# Patient Record
Sex: FEMALE | Race: WHITE | Employment: OTHER | URBAN - METROPOLITAN AREA
[De-identification: names, ages, dates, MRNs, and addresses within clinical notes are randomized per-mention and may not be internally consistent; named-entity substitution may affect disease eponyms.]

---

## 2019-02-19 ENCOUNTER — ED HISTORICAL/CONVERTED ENCOUNTER (OUTPATIENT)
Dept: OTHER | Age: 39
End: 2019-02-19

## 2020-04-28 ENCOUNTER — ED HISTORICAL/CONVERTED ENCOUNTER (OUTPATIENT)
Dept: OTHER | Age: 40
End: 2020-04-28

## 2021-10-12 ENCOUNTER — TRANSCRIBE ORDER (OUTPATIENT)
Dept: SCHEDULING | Age: 41
End: 2021-10-12

## 2021-10-12 DIAGNOSIS — Z12.31 SCREENING MAMMOGRAM FOR HIGH-RISK PATIENT: Primary | ICD-10-CM

## 2021-11-12 ENCOUNTER — HOSPITAL ENCOUNTER (OUTPATIENT)
Dept: MAMMOGRAPHY | Age: 41
Discharge: HOME | End: 2021-11-12
Payer: MEDICARE

## 2021-11-12 DIAGNOSIS — Z12.31 SCREENING MAMMOGRAM FOR HIGH-RISK PATIENT: ICD-10-CM

## 2021-11-12 PROCEDURE — 77063 BREAST TOMOSYNTHESIS BI: CPT

## 2021-12-12 ENCOUNTER — HOSPITAL ENCOUNTER (INPATIENT)
Age: 41
LOS: 10 days | Discharge: HOME | DRG: 885 | End: 2021-12-22
Attending: EMERGENCY MEDICINE | Admitting: PSYCHIATRY & NEUROLOGY
Payer: MEDICARE

## 2021-12-12 ENCOUNTER — APPOINTMENT (OUTPATIENT)
Dept: ULTRASOUND IMAGING | Age: 41
DRG: 885 | End: 2021-12-12
Attending: EMERGENCY MEDICINE
Payer: MEDICARE

## 2021-12-12 DIAGNOSIS — Z34.90 EARLY STAGE OF PREGNANCY: ICD-10-CM

## 2021-12-12 DIAGNOSIS — F32.A DEPRESSION, UNSPECIFIED DEPRESSION TYPE: Primary | ICD-10-CM

## 2021-12-12 PROBLEM — F41.9 ANXIETY: Status: ACTIVE | Noted: 2021-12-12

## 2021-12-12 LAB
ALBUMIN SERPL-MCNC: 3.7 G/DL (ref 3.5–5)
ALBUMIN/GLOB SERPL: 1 {RATIO} (ref 1.1–2.2)
ALP SERPL-CCNC: 116 U/L (ref 45–117)
ALT SERPL-CCNC: 26 U/L (ref 12–78)
AMPHET UR QL SCN: POSITIVE
ANION GAP SERPL CALC-SCNC: 7 MMOL/L (ref 5–15)
APPEARANCE UR: ABNORMAL
AST SERPL W P-5'-P-CCNC: 16 U/L (ref 15–37)
BACTERIA URNS QL MICRO: NEGATIVE /HPF
BARBITURATES UR QL SCN: NEGATIVE
BASOPHILS # BLD: 0.1 K/UL (ref 0–0.1)
BASOPHILS NFR BLD: 0 % (ref 0–1)
BENZODIAZ UR QL: NEGATIVE
BILIRUB SERPL-MCNC: 0.6 MG/DL (ref 0.2–1)
BILIRUB UR QL: NEGATIVE
BUN SERPL-MCNC: 8 MG/DL (ref 6–20)
BUN/CREAT SERPL: 11 (ref 12–20)
CA-I BLD-MCNC: 9.5 MG/DL (ref 8.5–10.1)
CANNABINOIDS UR QL SCN: POSITIVE
CHLORIDE SERPL-SCNC: 105 MMOL/L (ref 97–108)
CO2 SERPL-SCNC: 25 MMOL/L (ref 21–32)
COCAINE UR QL SCN: POSITIVE
COLOR UR: ABNORMAL
COVID-19 RAPID TEST, COVR: NOT DETECTED
CREAT SERPL-MCNC: 0.74 MG/DL (ref 0.55–1.02)
DIFFERENTIAL METHOD BLD: ABNORMAL
DRUG SCRN COMMENT,DRGCM: ABNORMAL
EOSINOPHIL # BLD: 0 K/UL (ref 0–0.4)
EOSINOPHIL NFR BLD: 0 % (ref 0–7)
ERYTHROCYTE [DISTWIDTH] IN BLOOD BY AUTOMATED COUNT: 14.6 % (ref 11.5–14.5)
ETHANOL SERPL-MCNC: <4 MG/DL
GLOBULIN SER CALC-MCNC: 3.6 G/DL (ref 2–4)
GLUCOSE SERPL-MCNC: 123 MG/DL (ref 65–100)
GLUCOSE UR STRIP.AUTO-MCNC: NEGATIVE MG/DL
HCG SERPL QL: POSITIVE
HCG SERPL-ACNC: 440 MIU/ML (ref 0–6)
HCT VFR BLD AUTO: 41.5 % (ref 35–47)
HGB BLD-MCNC: 13.2 G/DL (ref 11.5–16)
HGB UR QL STRIP: ABNORMAL
IMM GRANULOCYTES # BLD AUTO: 0.1 K/UL (ref 0–0.04)
IMM GRANULOCYTES NFR BLD AUTO: 0 % (ref 0–0.5)
KETONES UR QL STRIP.AUTO: 5 MG/DL
LEUKOCYTE ESTERASE UR QL STRIP.AUTO: ABNORMAL
LYMPHOCYTES # BLD: 0.9 K/UL (ref 0.8–3.5)
LYMPHOCYTES NFR BLD: 5 % (ref 12–49)
MCH RBC QN AUTO: 27.8 PG (ref 26–34)
MCHC RBC AUTO-ENTMCNC: 31.8 G/DL (ref 30–36.5)
MCV RBC AUTO: 87.6 FL (ref 80–99)
METHADONE UR QL: NEGATIVE
MONOCYTES # BLD: 1 K/UL (ref 0–1)
MONOCYTES NFR BLD: 6 % (ref 5–13)
MUCOUS THREADS URNS QL MICRO: ABNORMAL /LPF
NEUTS SEG # BLD: 15.8 K/UL (ref 1.8–8)
NEUTS SEG NFR BLD: 89 % (ref 32–75)
NITRITE UR QL STRIP.AUTO: NEGATIVE
NRBC # BLD: 0 K/UL (ref 0–0.01)
NRBC BLD-RTO: 0 PER 100 WBC
OPIATES UR QL: NEGATIVE
PCP UR QL: NEGATIVE
PH UR STRIP: 7 [PH] (ref 5–8)
PLATELET # BLD AUTO: 393 K/UL (ref 150–400)
PMV BLD AUTO: 9.8 FL (ref 8.9–12.9)
POTASSIUM SERPL-SCNC: 4 MMOL/L (ref 3.5–5.1)
PROT SERPL-MCNC: 7.3 G/DL (ref 6.4–8.2)
PROT UR STRIP-MCNC: NEGATIVE MG/DL
RBC # BLD AUTO: 4.74 M/UL (ref 3.8–5.2)
RBC #/AREA URNS HPF: ABNORMAL /HPF (ref 0–5)
SODIUM SERPL-SCNC: 137 MMOL/L (ref 136–145)
SP GR UR REFRACTOMETRY: 1.01 (ref 1–1.03)
SPECIMEN SOURCE: NORMAL
UA: UC IF INDICATED,UAUC: ABNORMAL
UROBILINOGEN UR QL STRIP.AUTO: 0.1 EU/DL (ref 0.1–1)
WBC # BLD AUTO: 17.8 K/UL (ref 3.6–11)
WBC URNS QL MICRO: ABNORMAL /HPF (ref 0–4)

## 2021-12-12 PROCEDURE — 76817 TRANSVAGINAL US OBSTETRIC: CPT

## 2021-12-12 PROCEDURE — 76801 OB US < 14 WKS SINGLE FETUS: CPT

## 2021-12-12 PROCEDURE — 74011250637 HC RX REV CODE- 250/637: Performed by: PSYCHIATRY & NEUROLOGY

## 2021-12-12 PROCEDURE — 84703 CHORIONIC GONADOTROPIN ASSAY: CPT

## 2021-12-12 PROCEDURE — 36415 COLL VENOUS BLD VENIPUNCTURE: CPT

## 2021-12-12 PROCEDURE — 81001 URINALYSIS AUTO W/SCOPE: CPT

## 2021-12-12 PROCEDURE — 80053 COMPREHEN METABOLIC PANEL: CPT

## 2021-12-12 PROCEDURE — 84702 CHORIONIC GONADOTROPIN TEST: CPT

## 2021-12-12 PROCEDURE — 85025 COMPLETE CBC W/AUTO DIFF WBC: CPT

## 2021-12-12 PROCEDURE — 80307 DRUG TEST PRSMV CHEM ANLYZR: CPT

## 2021-12-12 PROCEDURE — 99284 EMERGENCY DEPT VISIT MOD MDM: CPT

## 2021-12-12 PROCEDURE — 65220000003 HC RM SEMIPRIVATE PSYCH

## 2021-12-12 PROCEDURE — 82077 ASSAY SPEC XCP UR&BREATH IA: CPT

## 2021-12-12 PROCEDURE — 87635 SARS-COV-2 COVID-19 AMP PRB: CPT

## 2021-12-12 RX ORDER — ACETAMINOPHEN 325 MG/1
650 TABLET ORAL
Status: DISCONTINUED | OUTPATIENT
Start: 2021-12-12 | End: 2021-12-22 | Stop reason: HOSPADM

## 2021-12-12 RX ORDER — ADHESIVE BANDAGE
30 BANDAGE TOPICAL DAILY PRN
Status: DISCONTINUED | OUTPATIENT
Start: 2021-12-12 | End: 2021-12-22 | Stop reason: HOSPADM

## 2021-12-12 RX ADMIN — ACETAMINOPHEN 650 MG: 325 TABLET ORAL at 19:50
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
## 2021-12-12 NOTE — BH NOTES
Pt. In process of being admitted to [address] [place] [address]. Unable to attend group. . Relaxation

## 2021-12-12 NOTE — ROUTINE PROCESS
TRANSFER - OUT REPORT:    Verbal report given to Aletha Dickinson RN(name) on Chepe Dave  being transferred to [address] [place] [address](unit) for routine progression of care       Report consisted of patients Situation, Background, Assessment and   Recommendations(SBAR). Information from the following report(s) SBAR, ED Summary, [facility] and Recent Results was reviewed with the receiving nurse. Lines:       Opportunity for questions and clarification was provided.       Patient transported with:   [employer]
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
## 2021-12-12 NOTE — BH NOTES
Client continues to cry uncontrollable and apologizing if she has said anything that bothered someone. Verbalizes that she is going through a rough time and strggling to hold on. Verbalizes that she is unemotionally prepared. Client given emotional support. Sshe was able to eat some supper. Remains on close observation for safety.

## 2021-12-12 NOTE — BH NOTES
This 37year old caucasin female is being admitted to [address] [place] [address] under the professional servives of  with an admitting diagnosis of psychosis. Client presents a flat affect. She is emotional and tearful. Verbalizes that she is too upset to sign the consents. She did cooperate with the search. Verbalizes that the doctor told her to go home but the doctor that works up here wanted her to stay. Verbalizes that she is in shock about being pregnant and verbalizes that she is not sure she believes she is pregnant. told me she has grandchildren but not sure she has any other children. She has flight of ideas and loose associations. Appears to to be listening to internal stimuli because at times she sad she needed to whisper because ths voices were listening to her. Verbalizes that she has been poisoned and that is why she is feeling confused. Believes that her mom is dead as well as some other family members. Verbalizes that she feels like her whole world is crashing around her and she doesnot know what to do. Denies feeling suicidal tierney homicidal.Verbalizes smoking weed and meth at times,then told me she might smoke cocaine too. Client will be placed on close observation for safety. Torrie Tracy

## 2021-12-13 LAB — HCG SERPL-ACNC: 674 MIU/ML (ref 0–6)

## 2021-12-13 PROCEDURE — 84702 CHORIONIC GONADOTROPIN TEST: CPT

## 2021-12-13 PROCEDURE — 65220000003 HC RM SEMIPRIVATE PSYCH

## 2021-12-13 PROCEDURE — 74011250637 HC RX REV CODE- 250/637: Performed by: INTERNAL MEDICINE

## 2021-12-13 PROCEDURE — 36415 COLL VENOUS BLD VENIPUNCTURE: CPT

## 2021-12-13 PROCEDURE — 74011250637 HC RX REV CODE- 250/637: Performed by: PSYCHIATRY & NEUROLOGY

## 2021-12-13 RX ORDER — BUDESONIDE AND FORMOTEROL FUMARATE DIHYDRATE 160; 4.5 UG/1; UG/1
2 AEROSOL RESPIRATORY (INHALATION)
Status: DISCONTINUED | OUTPATIENT
Start: 2021-12-13 | End: 2021-12-22 | Stop reason: HOSPADM

## 2021-12-13 RX ORDER — ALBUTEROL SULFATE 90 UG/1
2 AEROSOL, METERED RESPIRATORY (INHALATION)
Status: DISCONTINUED | OUTPATIENT
Start: 2021-12-13 | End: 2021-12-14

## 2021-12-13 RX ORDER — GUAIFENESIN 100 MG/5ML
100 SOLUTION ORAL
Status: DISCONTINUED | OUTPATIENT
Start: 2021-12-13 | End: 2021-12-22 | Stop reason: HOSPADM

## 2021-12-13 RX ADMIN — ALBUTEROL SULFATE 2 PUFF: 108 AEROSOL, METERED RESPIRATORY (INHALATION) at 17:26

## 2021-12-13 RX ADMIN — BUDESONIDE AND FORMOTEROL FUMARATE DIHYDRATE 2 PUFF: 160; 4.5 AEROSOL RESPIRATORY (INHALATION) at 17:26

## 2021-12-13 RX ADMIN — ACETAMINOPHEN 650 MG: 325 TABLET ORAL at 09:36

## 2021-12-13 RX ADMIN — BUDESONIDE AND FORMOTEROL FUMARATE DIHYDRATE 2 PUFF: 160; 4.5 AEROSOL RESPIRATORY (INHALATION) at 21:24

## 2021-12-13 RX ADMIN — ALBUTEROL SULFATE 2 PUFF: 108 AEROSOL, METERED RESPIRATORY (INHALATION) at 21:24

## 2021-12-13 RX ADMIN — ACETAMINOPHEN 650 MG: 325 TABLET ORAL at 00:14

## 2021-12-13 NOTE — CONSULTS
Consult    Patient: Catie Pritchett MRN: [id number]  SSN: [id number]    YOB: 1980  Age: 37 y.o. Sex: female      Subjective:      Catie Pritchett is a 37 y.o. female who is being seen for anxiety depression, drug abuse, PTSD, has a cough. Smokes cigarettes    Past Medical History:   Diagnosis Date    Anxiety     Depression     Drug abuse ([facility] [address]     PTSD (post-traumatic stress disorder)      History reviewed. No pertinent surgical history. History reviewed. No pertinent family history. Social History     Tobacco Use    Smoking status: Heavy Tobacco Smoker     Packs/day: 1.00    Smokeless tobacco: Never Used   Substance Use Topics    Alcohol use: Yes     Comment: rarely      Current Facility-Administered Medications   Medication Dose Route Frequency Provider Last Rate Last Admin    acetaminophen (TYLENOL) tablet 650 mg  650 mg Oral Q4H PRN Jeffery Madden MD   650 mg at 12/13/21 0936    magnesium hydroxide (MILK OF MAGNESIA) 400 mg/5 mL oral suspension 30 mL  30 mL Oral DAILY PRN Azael Agarwal MD            Allergies   Allergen Reactions    Nickel Rash       Review of Systems:  A comprehensive review of systems was negative except for that written in the History of Present Illness. Objective:     Vitals:    12/12/21 0756 12/12/21 0837 12/12/21 2000 12/13/21 0900   BP: 130/68 118/81 122/65 123/80   Pulse: (!) 115 81 93 88   Resp: 16 24 20 20   Temp: 97.8 °F (36.6 °C)  98.1 °F (36.7 °C) 97.8 °F (36.6 °C)   SpO2: 100% 100%  98%   Weight: 79.4 kg (175 lb)      Height: 5' 4\" (1.626 m)           Physical Exam:  General:  Alert, cooperative, no distress, appears stated age. Eyes:  Conjunctivae/corneas clear. PERRL, EOMs intact. Fundi benign   Ears:  Normal TMs and external ear canals both ears. Nose: Nares normal. Septum midline. Mucosa normal. No drainage or sinus tenderness.    Mouth/Throat: Lips, mucosa, and tongue normal. Teeth and gums normal.   Neck: Supple, symmetrical, trachea midline, no adenopathy, thyroid: no enlargment/tenderness/nodules, no carotid bruit and no JVD. Back:   Symmetric, no curvature. ROM normal. No CVA tenderness. Lungs:    Prolonged expiration to auscultation bilaterally. Heart:  Regular rate and rhythm, S1, S2 normal, no murmur, click, rub or gallop. Abdomen:   Soft, non-tender. Bowel sounds normal. No masses,  No organomegaly. Extremities: Extremities normal, atraumatic, no cyanosis or edema. Pulses: 2+ and symmetric all extremities. Skin: Skin color, texture, turgor normal. No rashes or lesions   Lymph nodes: Cervical, supraclavicular, and axillary nodes normal.   Neurologic: CNII-XII intact. Normal strength, sensation and reflexes throughout. Recent Results (from the past 24 hour(s))   BETA HCG, QT    Collection Time: 12/12/21 11:34 AM   Result Value Ref Range    Beta HCG, .0 (H) 0 - 6 mIU/mL   COVID-19 RAPID TEST    Collection Time: 12/12/21  1:38 PM   Result Value Ref Range    Specimen source Please find results under separate order      COVID-19 rapid test Not Detected Not Detected         Assessment:     Hospital Problems  Never Reviewed          Codes Class Noted POA    Anxiety ICD-10-CM: F41.9  ICD-9-CM: 300.00  12/12/2021 Unknown        Depression ICD-10-CM: F32. A  ICD-9-CM: 170  12/12/2021 Unknown          COPD with acute extubation      Plan:     Result albuterol on Flovent    Signed By: Miriam Alcaraz MD     December 13, 2021

## 2021-12-13 NOTE — GROUP NOTE
[facility] GROUP DOCUMENTATION INDIVIDUAL                                                                          Group Therapy Note    Date: 12/13/2021    Group Start Time: 8131  Group End Time: 1400  Group Topic: Process Group - Inpatient    [facility] CARE MANAGEMENT    Aida Porras BSW    [facility] GROUP DOCUMENTATION GROUP    Group Therapy Note    The facilitator engaged the group in a game and an activity that promoted feedback and support for one another. Attendees: 9/14         Attendance: Attended    Patient's Goal:  Attend Group     Interventions/techniques: Supported    Follows Directions: Followed directions    Interactions: Interacted appropriately    Mental Status: Anxious and Worried    Behavior/appearance: Agitated, Disheveled, Grooming impaired, Needed prompting, Negative, Poor eye contact, Resistive/oppositional and Withdrawn/quiet    Goals Achieved: Able to listen to others      Additional Notes: The patient appeared to be in a negative mood while in group and voiced that she felt like people were making fun of her and laughing about things that weren't funny. The writer encouraged her to understand that people in group are of different capacities and diagnoses. And not to take everything personally, but she became tearful and was inconsolable.       COLTON Garcia

## 2021-12-13 NOTE — BH NOTES
Patient has been mostly isolating to her room. Family brought some clothes but several pieces are not appropriate for this unit so were put in storage. Patient tearful at times, want to go home, \"needs\" to go home to her \"family\". When asked where the family lives. She shrugged and then named apx 6 states/locations. Dr Pat Mayen ordered repeat HCG quant which was drawn by lab. Patient isolated in her room much of the shift, sitting on the bed with sad affect.   Journal given to patient  [phone]- patient wants AMA, d19 is going to eval.      Verdict from [address]    Patient staying on VOLUNTARY basis for treatment

## 2021-12-13 NOTE — BH NOTES
PSA PART II ADDITIONAL INFORMATION        Access To Fire Arms: No    Substance Use: YES    Last Use: Unable to provide     Type of Substance:  Cocaine use and THC use    Frequency of Use: Unable to provide     Request to See : YES    If yes, notified : NO    Release of Information Signed: NO    Release of Information Signed For: Candy Zhang [phone]
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
## 2021-12-13 NOTE — BH NOTES
Pt. Encouraged to attend group but did not attend.   Psycho-ED/Information relating to trauma and ways to cope

## 2021-12-13 NOTE — GROUP NOTE
DIANA  GROUP DOCUMENTATION INDIVIDUAL                                                                          Group Therapy Note    Date: 12/13/2021    Group Start Time: 1025  Group End Time: 7000  Group Topic: Nursing    SRM 2  NON ACUTE    Jose E Magana LPN IP  GROUP DOCUMENTATION GROUP    Group Therapy Note    Attendees: 8/15         Attendance: Attended    Patient's Goal:  ID benefits of medications    Interventions/techniques: Challenged    Follows Directions:  Followed directions    Interactions: Interacted appropriately    Mental Status: Calm    Behavior/appearance: Attentive    Goals Achieved: Able to listen to others      Additional Notes:  Pt. Stated medications keep her calm & helps her medically    Idris Pizarro LPN

## 2021-12-13 NOTE — BH NOTES
The patient signed JANETT but declined to sign her treatment plan because she was unable to read the document for herself due to her eyesight, will follow up.

## 2021-12-13 NOTE — BH NOTES
The pt is resting quietly in bed at the present time. At [facility] [address] the pt c/o apryl hip pain that she rated a 10/10. Pt received prn Tylenol and was given two additional pillow for comfort. Pt noted to be dramatic when expressing her needs or concern. After receiving the Tylenol, the pt was rested quietly in bed without any distress. At [facility], the pt came to the nurses station and stated, \" I just woke up. \"  When asked if she needed anything, the pt stated, \" I just woke up! \"  The pt was informed of the time, given a snack, and informed of the routine in the am. She returned to her room and has been resting quietly without any c/o discomfort. Respirations are quiet and unlabored. She remains on close observation for safety.

## 2021-12-13 NOTE — PROGRESS NOTES
Problem: Depressed Mood (Adult/Pediatric)  Goal: *STG: Remains safe in hospital  Outcome: Progressing Towards Goal     Problem: Psychosis  Goal: *STG: Decreased hallucinations  Outcome: Not Progressing Towards Goal  Goal: *STG: Decreased delusional thinking  Outcome: Not Progressing Towards Goal     Ms. Harrington was in her room alert and tearful at the onset of the shift. She began crying profusely throughout the hallways and presented a flight of ideas as the source of her despair. She presented disheveled, restless and labile. She could not identify the hospital or day and time. She became increasingly clear and calm after speaking to her daughter on the phone. She was redirected for yelling at her peers to \"shut up\" and other words while she was on the phone. She rated her anxiety 10 out of 10 and stated that she was only doing drugs because of her boyfriend. She spoke of 4 children and a grandchild that she missed. She stated that she was going to pace and try breathing exercises to cope with the anxiety until she became tired enough to sleep. Dr. Riuz Garcia was informed of her condition and instructed to continue to monitor. Ms. Karena Reyes denied abnormal bleeding and confirmed that she had not menstruated in at least 2 months. Staff will continue to monitor on every 15 minute checks.

## 2021-12-14 PROBLEM — F19.10 POLYSUBSTANCE ABUSE (HCC): Status: ACTIVE | Noted: 2021-12-14

## 2021-12-14 PROBLEM — Z32.01 POSITIVE BLOOD PREGNANCY TEST: Status: ACTIVE | Noted: 2021-12-14

## 2021-12-14 PROBLEM — N91.2 AMENORRHEA: Status: ACTIVE | Noted: 2021-12-14

## 2021-12-14 PROCEDURE — 99231 SBSQ HOSP IP/OBS SF/LOW 25: CPT | Performed by: OBSTETRICS & GYNECOLOGY

## 2021-12-14 PROCEDURE — 74011250637 HC RX REV CODE- 250/637: Performed by: INTERNAL MEDICINE

## 2021-12-14 PROCEDURE — 74011250637 HC RX REV CODE- 250/637: Performed by: PSYCHIATRY & NEUROLOGY

## 2021-12-14 PROCEDURE — 65220000003 HC RM SEMIPRIVATE PSYCH

## 2021-12-14 RX ORDER — ALBUTEROL SULFATE 90 UG/1
2 AEROSOL, METERED RESPIRATORY (INHALATION)
Status: DISCONTINUED | OUTPATIENT
Start: 2021-12-14 | End: 2021-12-22 | Stop reason: HOSPADM

## 2021-12-14 RX ADMIN — LURASIDONE HYDROCHLORIDE 20 MG: 20 TABLET, FILM COATED ORAL at 18:16

## 2021-12-14 RX ADMIN — ALBUTEROL SULFATE 2 PUFF: 108 AEROSOL, METERED RESPIRATORY (INHALATION) at 13:26

## 2021-12-14 RX ADMIN — BUDESONIDE AND FORMOTEROL FUMARATE DIHYDRATE 2 PUFF: 160; 4.5 AEROSOL RESPIRATORY (INHALATION) at 21:24

## 2021-12-14 RX ADMIN — BUDESONIDE AND FORMOTEROL FUMARATE DIHYDRATE 2 PUFF: 160; 4.5 AEROSOL RESPIRATORY (INHALATION) at 09:02

## 2021-12-14 NOTE — BH NOTES
Notified Dr. Romo Homar  of consult with patient. Reviewed positive pregnancy  results, both qualitative & quantitative. Dr. Branden Stokes stated that he will see patient this afternoon.

## 2021-12-14 NOTE — CONSULTS
Tahir Dave is a 37 y.o. female who presents today for the following:  Chief Complaint   Patient presents with    Mental Health Problem        Allergies   Allergen Reactions    Nickel Rash       Current Facility-Administered Medications   Medication    albuterol (PROVENTIL HFA, VENTOLIN HFA, PROAIR HFA) inhaler 2 Puff    lurasidone (LATUDA) tablet 20 mg    budesonide-formoteroL (SYMBICORT) 160-4.5 mcg/actuation HFA inhaler 2 Puff    guaiFENesin (ROBITUSSIN) 100 mg/5 mL oral liquid 100 mg    acetaminophen (TYLENOL) tablet 650 mg    magnesium hydroxide (MILK OF MAGNESIA) 400 mg/5 mL oral suspension 30 mL       Past Medical History:   Diagnosis Date    Anxiety     Depression     Drug abuse ([facility] [address]     PTSD (post-traumatic stress disorder)        History reviewed. No pertinent surgical history. History reviewed. No pertinent family history. Social History     Socioeconomic History    Marital status:      Spouse name: Not on file    Number of children: Not on file    Years of education: Not on file    Highest education level: Not on file   Occupational History    Not on file   Tobacco Use    Smoking status: Heavy Tobacco Smoker     Packs/day: 1.00    Smokeless tobacco: Never Used   Vaping Use    Vaping Use: Never used   Substance and Sexual Activity    Alcohol use: Yes     Comment: rarely    Drug use: Not Currently    Sexual activity: Yes   Other Topics Concern    Not on file   Social History Narrative    Not on file     Social Determinants of Health     Financial Resource Strain:     Difficulty of Paying Living Expenses: Not on file   Food Insecurity:     Worried About Running Out of Food in the Last Year: Not on file    Jacki of Food in the Last Year: Not on file   Transportation Needs:     Lack of Transportation (Medical): Not on file    Lack of Transportation (Non-Medical):  Not on file   Physical Activity:     Days of Exercise per Week: Not on file    Minutes of Exercise per Session: Not on file   Stress:     Feeling of Stress : Not on file   Social Connections:     Frequency of Communication with Friends and Family: Not on file    Frequency of Social Gatherings with Friends and Family: Not on file    Attends Cheondoism Services: Not on file    Active Member of Clubs or Organizations: Not on file    Attends Club or Organization Meetings: Not on file    Marital Status: Not on file   Intimate Partner Violence:     Fear of Current or Ex-Partner: Not on file    Emotionally Abused: Not on file    Physically Abused: Not on file    Sexually Abused: Not on file   Housing Stability:     Unable to Pay for Housing in the Last Year: Not on file    Number of [place] in the Last Year: Not on file    Unstable Housing in the Last Year: Not on file         HPI  37years old patient admitted at behavioral health, consulted after findings of a positive pregnancy test.  She has depression, anxiety, polysubstance abuse, PTSD, history of smoking, history of COPD.  hCG levels sheila from 440 to 674 in approximately 24 hours. Pelvic ultrasound is within normal limits, with no evidence of pregnancy at this time. ROS   Review of Systems   Constitutional: Negative. HENT: Negative. Eyes: Negative. Respiratory: Positive for COPD  Cardiovascular: Negative. Gastrointestinal: Negative. Genitourinary: Positive for amenorrhea  Musculoskeletal: Negative. Skin: Negative. Neurological: Negative. Endo/Heme/Allergies: Negative.     Psychiatric/Behavioral: Positive for bipolar disorder, depression, psychosis    /69   Pulse 84   Temp 98.1 °F (36.7 °C)   Resp 18   Ht 5' 4\" (1.626 m)   Wt 79.4 kg (175 lb)   SpO2 98%   Breastfeeding No   BMI 30.04 kg/m²    OBGyn Exam   Constitutional  · Appearance: well-nourished, well developed, alert, agitated, depressed, crying    Neurologic/Psychiatric  · Mental Status:  · Orientation: grossly oriented to person, place and time  · Mood and Affect: Depressed mood    Results for orders placed or performed during the hospital encounter of 12/12/21   COVID-19 RAPID TEST   Result Value Ref Range    Specimen source Please find results under separate order      COVID-19 rapid test Not Detected Not Detected     US PREG UTS < 14 WKS SNGL    Narrative    The exam was performed using both transabdominal and endovaginal transducers. The uterus measures 8.8 cm x 4.5 cm x 5.2 cm. Endometrial stripe is normal in  appearance. No intrauterine gestational sac is seen. The ovaries are also  unremarkable measuring 3.2 cm x 2.1 cm x 2.0 cm on the right and 2.3 cm x 1.6 cm  x 1.4 cm on the left. There is bilateral ovarian blood flow. No adnexal mass or  is seen. There is trace free fluid posterior to the uterus. Impression    No evidence of intrauterine pregnancy. Findings could be related to  very early pregnancy however ectopic pregnancy is not excluded on this exam and  follow-up is recommended. There is trace free fluid posterior to the uterus  however no adnexal mass is seen. CBC WITH AUTOMATED DIFF   Result Value Ref Range    WBC 17.8 (H) 3.6 - 11.0 K/uL    RBC 4.74 3.80 - 5.20 M/uL    HGB 13.2 11.5 - 16.0 g/dL    HCT 41.5 35.0 - 47.0 %    MCV 87.6 80.0 - 99.0 FL    MCH 27.8 26.0 - 34.0 PG    MCHC 31.8 30.0 - 36.5 g/dL    RDW 14.6 (H) 11.5 - 14.5 %    PLATELET 957 841 - 934 K/uL    MPV 9.8 8.9 - 12.9 FL    NRBC 0.0 0.0  WBC    ABSOLUTE NRBC 0.00 0.00 - 0.01 K/uL    NEUTROPHILS 89 (H) 32 - 75 %    LYMPHOCYTES 5 (L) 12 - 49 %    MONOCYTES 6 5 - 13 %    EOSINOPHILS 0 0 - 7 %    BASOPHILS 0 0 - 1 %    IMMATURE GRANULOCYTES 0 0 - 0.5 %    ABS. NEUTROPHILS 15.8 (H) 1.8 - 8.0 K/UL    ABS. LYMPHOCYTES 0.9 0.8 - 3.5 K/UL    ABS. MONOCYTES 1.0 0.0 - 1.0 K/UL    ABS. EOSINOPHILS 0.0 0.0 - 0.4 K/UL    ABS. BASOPHILS 0.1 0.0 - 0.1 K/UL    ABS. IMM.  GRANS. 0.1 (H) 0.00 - 0.04 K/UL    DF AUTOMATED     METABOLIC PANEL, COMPREHENSIVE   Result Value Ref Range    Sodium 137 136 - 145 mmol/L    Potassium 4.0 3.5 - 5.1 mmol/L    Chloride 105 97 - 108 mmol/L    CO2 25 21 - 32 mmol/L    Anion gap 7 5 - 15 mmol/L    Glucose 123 (H) 65 - 100 mg/dL    BUN 8 6 - 20 mg/dL    Creatinine 0.74 0.55 - 1.02 mg/dL    BUN/Creatinine ratio 11 (L) 12 - 20      GFR est AA >60 >60 ml/min/1.73m2    GFR est non-AA >60 >60 ml/min/1.73m2    Calcium 9.5 8.5 - 10.1 mg/dL    Bilirubin, total 0.6 0.2 - 1.0 mg/dL    AST (SGOT) 16 15 - 37 U/L    ALT (SGPT) 26 12 - 78 U/L    Alk. phosphatase 116 45 - 117 U/L    Protein, total 7.3 6.4 - 8.2 g/dL    Albumin 3.7 3.5 - 5.0 g/dL    Globulin 3.6 2.0 - 4.0 g/dL    A-G Ratio 1.0 (L) 1.1 - 2.2     ETHYL ALCOHOL   Result Value Ref Range    ALCOHOL(ETHYL),SERUM <4 <10 mg/dL   HCG QL SERUM   Result Value Ref Range    HCG, Ql. Positive (A) Negative     DRUG SCREEN, URINE   Result Value Ref Range    AMPHETAMINES Positive (A) Negative      BARBITURATES Negative Negative      BENZODIAZEPINES Negative Negative      COCAINE Positive (A) Negative      METHADONE Negative Negative      OPIATES Negative Negative      PCP(PHENCYCLIDINE) Negative Negative      THC (TH-CANNABINOL) Positive (A) Negative      Drug screen comment        This test is a screen for drugs of abuse in a medical setting only (i.e., they are unconfirmed results and as such must not be used for non-medical purposes, e.g.,employment testing, legal testing). Due to its inherent nature, false positive (FP) and false negative (FN) results may be obtained. Therefore, if necessary for medical care, recommend confirmation of positive findings by GC/MS.    URINALYSIS W/ REFLEX CULTURE    Specimen: Urine   Result Value Ref Range    Color Yellow/Straw      Appearance Turbid (A) Clear      Specific gravity 1.012 1.003 - 1.030      pH (UA) 7.0 5.0 - 8.0      Protein Negative Negative mg/dL    Glucose Negative Negative mg/dL    Ketone 5 (A) Negative mg/dL    Bilirubin Negative Negative      Blood Small (A) Negative      Urobilinogen 0.1 0.1 - 1.0 EU/dL    Nitrites Negative Negative      Leukocyte Esterase Small (A) Negative      UA:UC IF INDICATED Culture not indicated by UA result Culture not indicated by UA result      WBC 0-4 0 - 4 /hpf    RBC 0-5 0 - 5 /hpf    Bacteria Negative Negative /hpf    Mucus Trace /lpf   BETA HCG, QT   Result Value Ref Range    Beta HCG, .0 (H) 0 - 6 mIU/mL   BETA HCG, QT   Result Value Ref Range    Beta HCG, .0 (H) 0 - 6 mIU/mL        Orders Placed This Encounter    COVID-19 RAPID TEST     Standing Status:   Standing     Number of Occurrences:   1     Order Specific Question:   Is this test for diagnosis or screening? Answer:   Diagnosis of ill patient     Order Specific Question:   Symptomatic for COVID-19 as defined by CDC? Answer:   Yes     Order Specific Question:   Date of Symptom Onset     Answer:   12/12/2021     Order Specific Question:   Hospitalized for COVID-19? Answer:   No     Order Specific Question:   Admitted to ICU for COVID-19? Answer:   No     Order Specific Question:   Employed in healthcare setting? Answer:   No     Order Specific Question:   Resident in a congregate (group) care setting? Answer:   No     Order Specific Question:   Pregnant? Answer:   No     Order Specific Question:   Previously tested for COVID-19? Answer:   No    US PREG UTS < 14 WKS SNGL     Standing Status:   Standing     Number of Occurrences:   1     Order Specific Question:   Transport     Answer:   BED [2]     Order Specific Question:   Reason for Exam     Answer:   pos preg     Order Specific Question:   Is Patient Pregnant? Answer: Yes    US UTS TRANSVAGINAL OB     Standing Status:   Standing     Number of Occurrences:   1     Order Specific Question:   Transport     Answer:   BED [2]     Order Specific Question:   Reason for Exam     Answer:   pos preg     Order Specific Question:   Is Patient Pregnant? Answer:    Yes    CBC WITH AUTOMATED DIFF     Standing Status:   Standing     Number of Occurrences:   1    METABOLIC PANEL, COMPREHENSIVE     Standing Status:   Standing     Number of Occurrences:   1    ETHYL ALCOHOL     Standing Status:   Standing     Number of Occurrences:   1    HCG QL SERUM     Standing Status:   Standing     Number of Occurrences:   1    DRUG SCREEN, URINE     Standing Status:   Standing     Number of Occurrences:   1    URINALYSIS W/ REFLEX CULTURE     Standing Status:   Standing     Number of Occurrences:   1    BETA HCG, QT     Standing Status:   Standing     Number of Occurrences:   1    BETA HCG, QT     Standing Status:   Standing     Number of Occurrences:   1    ADULT DIET Regular; Double Portions     Standing Status:   Standing     Number of Occurrences:   1     Order Specific Question:   Primary Diet:     Answer:   Regular     Order Specific Question:   Likes/Dislikes/Preferences: Answer:   Double Portions    VITAL SIGNS PER UNIT ROUTINE     Standing Status:   Standing     Number of Occurrences:   1    UP AD TEOFILO     Standing Status:   Standing     Number of Occurrences:   1    NOTIFY PROVIDER: VITAL SIGNS CHANGES     Standing Status:   Standing     Number of Occurrences:   1    NURSING-MISCELLANEOUS: Abnormal Involuntary Movement Scale (AIMS) ONCE on admission with previous or current history of antipsychotic use ONE TIME     Standing Status:   Standing     Number of Occurrences:   1     Order Specific Question:   Description of Order:     Answer:   Abnormal Involuntary Movement Scale (AIMS) ONCE on admission with previous or current history of antipsychotic use    FULL CODE     Standing Status:   Standing     Number of Occurrences:   1    IP CONSULT TO INTERNAL MEDICINE     Standing Status:   Standing     Number of Occurrences:   1     Order Specific Question:   Reason for Consult: Answer: For medical clearance, evaluation, and H&P. Doctor on Call or Hospitalist may be consulted. Order Specific Question:   Did you call or speak to the consulting provider? Answer: Yes    SAFETY PRECAUTIONS - EVERY 15 MIN ROUNDS     Every 15 minute rounds     Standing Status:   Standing     Number of Occurrences:   1    acetaminophen (TYLENOL) tablet 650 mg    magnesium hydroxide (MILK OF MAGNESIA) 400 mg/5 mL oral suspension 30 mL    DISCONTD: albuterol (PROVENTIL HFA, VENTOLIN HFA, PROAIR HFA) inhaler 2 Puff     Order Specific Question:   MODE OF DELIVERY     Answer:   Inline     Order Specific Question:   Initiate RT Bronchodilator Protocol     Answer: Yes    budesonide-formoteroL (SYMBICORT) 160-4.5 mcg/actuation HFA inhaler 2 Puff    guaiFENesin (ROBITUSSIN) 100 mg/5 mL oral liquid 100 mg    albuterol (PROVENTIL HFA, VENTOLIN HFA, PROAIR HFA) inhaler 2 Puff     Order Specific Question:   MODE OF DELIVERY     Answer:   Inline     Order Specific Question:   Initiate RT Bronchodilator Protocol     Answer: Yes    lurasidone (LATUDA) tablet 20 mg    IP CONSULT TO OB GYN     Standing Status:   Standing     Number of Occurrences:   1     Order Specific Question:   Reason for Consult: Answer:   Rule out pregnancy     Order Specific Question:   Did you call or speak to the consulting provider? Answer:   No     Order Specific Question:   Consult To     Answer:   Dr. Jose Portillo     Order Specific Question:   Schedule When? Answer:   TODAY    INITIAL PHYSICIAN ORDER: INPATIENT Behavioral Health Acute; 7. Inpatient psychiatric hospital admission is medically necessary for treatment which can reasonably be expected to improve the patients condition and/or for diagnostic study. Standing Status:   Standing     Number of Occurrences:   1     Order Specific Question:   Status:      Answer:   IP BEHAVIORAL MEDICINE [112]     Order Specific Question:   Type of Bed     Answer:   Behavioral Health Acute [27]     Order Specific Question:   Inpatient Hospitalization Certified Necessary for the Following Reasons     Answer:   7. Inpatient psychiatric hospital admission is medically necessary for treatment which can reasonably be expected to improve the patients condition and/or for diagnostic study. Order Specific Question:   Admitting Diagnosis     Answer:   Depression [[id number]]     Order Specific Question:   Admitting Diagnosis     Answer: Anxiety [[id number]]     Order Specific Question:   Admitting Physician     Answer:   Jemima Shah     Order Specific Question:   Attending Physician     Answer:   Jemima Shah     Order Specific Question:   Estimated Length of Stay     Answer:   5-7 Midnights     Order Specific Question:   Discharge Plan:     Answer:   Home with Office Follow-up    IP CONSULT TO CASE MANAGEMENT     Standing Status:   Standing     Number of Occurrences:   1     Order Specific Question:   Reasons for Consult: Answer:   Assistance with Discharge Planning     ASSESSMENT:    1. Depression, unspecified depression type      2. Early stage of pregnancy  Very early stage of pregnancy. Low hCG levels. Adequate increase noted. Usually hCG levels increase 60% every 48 hours. PLAN:  Serial hCG levels, weekly  A follow-up ultrasound in 4 to 6 weeks may be in order, earlier if clinically indicated  Outpatient follow-up  I agree with medical treatment, Nicola 27    Thank you for allowing me to participate in the care of this patient. Please let me know if I can be of further assistance.

## 2021-12-14 NOTE — BH NOTES
Patient up for meals, eats 100%. OB-GYN consult completed. Physician in to meet with patient & explained positive pregnancy tests. Patient presents extremely labile. Order received to begin latuda 20 mg po. Patient stated that she had been on this before & that it didn't work. Negative attitude toward treatment. Patient did accept scheduled latuda as ordered. Spent time on the phone. Denies SI/HI. Denies AVH. No physical complaints voiced. Remains on CO. Continue to monitor. Noted on the phone several times today.

## 2021-12-14 NOTE — BH NOTES
Pt. Encouraged to attend group but did not attend.   Leisure skills group Cyclophosphamide Counseling:  I discussed with the patient the risks of cyclophosphamide including but not limited to hair loss, hormonal abnormalities, decreased fertility, abdominal pain, diarrhea, nausea and vomiting, bone marrow suppression and infection. The patient understands that monitoring is required while taking this medication.

## 2021-12-14 NOTE — PROGRESS NOTES
Problem: Depressed Mood (Adult/Pediatric)  Goal: *STG: Remains safe in hospital  Outcome: Progressing Towards Goal     Problem: Depressed Mood (Adult/Pediatric)  Goal: *STG: Complies with medication therapy  Outcome: Progressing Towards Goal         Patient remains safe in the hospital; patient received inhalers. No concerns reported and none were observed.

## 2021-12-14 NOTE — GROUP NOTE
[facility] GROUP DOCUMENTATION INDIVIDUAL                                                                          Group Therapy Note    Date: 12/14/2021    Group Start Time: 3925  Group End Time: 1400  Group Topic: Process Group - Inpatient    SRM 2 BEHA TH ACUTE    Deanna Jiang    [facility] GROUP DOCUMENTATION GROUP    Group Therapy Note    Attendees: 9/16    Process: pts were asked how they are doing as a check in question. Pts were then encouraged to share their reasons for admission. Pts spoke about loss in their families, how they have had to over come trauma, and relationships that have been dismissive of their issues. Pts were encouraged to provide positive feedback to one another. Pts were then encouraged to participate in a breathing exercise and reflect on group. Attendance: Attended    Patient's Goal:  Attend activities and groups     Interventions/techniques: Validated, Promoted peer support, Provide feedback and Supported    Follows Directions: Followed directions    Interactions: Interacted appropriately    Mental Status: Calm, Congruent and Flat    Behavior/appearance: Attentive, Motivated, Neatly groomed and Withdrawn/quiet    Goals Achieved: Able to engage in interactions, Able to listen to others, Able to reflect/comment on own behavior and Able to manage/cope with feelings      Additional Notes: Pt opened up about her mothers death and how she had to take her off life support. Pt was tearful in group but was receptive to peers support.  Pt is making progress towards goals by attending and participating in group    Darlynn Cowden

## 2021-12-14 NOTE — BH NOTES
Nurse Note:    Patient isolative to the room this evening. Patient has flat affect with depressed mood. Patient states that she doesn't want to be here and is irritated that she doesn't receive medications at this time. Currently denies SI, HI, A/V hallucinations. Reports anxiety and depression due to being here, but did not rate either. Reports eating meals and intermittent sleep. No pain or discomfort reported; will continue to monitor for safety.
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
## 2021-12-14 NOTE — GROUP NOTE
[facility] GROUP DOCUMENTATION INDIVIDUAL                                                                          Group Therapy Note    Date: 12/14/2021    Group Start Time: 4080  Group End Time: 1500  Group Topic: AA/NA    SRM 2 BEHA HLTH ACUTE    Troyta Austin    [facility] GROUP DOCUMENTATION GROUP    Group Therapy Note  The therapist facilitated a group by encouraging the pt's to process how their substance use has impacted their lives. She also provided education about the disease of addiction throughout group. Attendees: 6         Attendance: Attended    Patient's Goal:  To attend groups and activities     Interventions/techniques: Validated, Reinforced and Supported    Follows Directions: Followed directions    Interactions: Interacted appropriately    Mental Status: Calm    Behavior/appearance: Attentive and Cooperative    Goals Achieved: Able to engage in interactions, Able to listen to others, Able to reflect/comment on own behavior, Able to self-disclose and Identified relapse prevention strategies      Additional Notes: The pt minimized her substance use throughout group. She said that she does not feel like she has a problem with it, and only uses occassionally to \"cope\" with things. She spoke about using from unresolved trauma from her past. She also said that everyone in her life has abused substances as well. She was open to taking psychotropic medications as well as an alternative to using.      Francina Dandy

## 2021-12-14 NOTE — BH NOTES
Behavioral Health Treatment Team Note     Patient goal(s) for today: To go home  Treatment team focus/goals: To continue group therapy and medication management. Progress note: The patient was presenting as anxious as evidenced by her restlessness, and becoming tearful easily. She denied SI/HI and AH/VH's. She said that she has been trying to seek both mental and physical help for some time now, and that the hospital is not what she expected it to be. She said that she has been experiencing anxiety and depression for some time now. She said that she has been focused on caring so much for others that she has neglected herself as a result. She spoke about her children as well. She said that they are adults now but she gave the majority of them up for adoption because she realized that she could not care for them the way that they needed to be cared for. She said that she had no money at the time, and that their father was not paying child support. She also spoke about her concerns about what she is going to do once she leaves the hospital in terms of housing. She said that the living conditions are very bad where her and her  live currently. She said that it is so bad that they are trying to get the house condemned. She said that her fiance is trying to work on figuring out alternative housing right now. This writer also spoke to her about potentially attending an IOP program when she lives. She said that she would be willing to do that, as long as she has housing to return to. She also spoke about wanting to be on medications, and was frustrated that she has had so many pregnancy tests done that indicate that she is not pregnant, or so she thought. This writer said that she would follow-up with nursing staff and the doctors. An inpatient level of care is necessary in order to stabilize the pt's mood and symptoms further.      LOS:  2  Expected LOS: 5-7 days     Insurance info/prescription coverage:  VA Medicare part A and B  Date of last family contact:  Not made yet  Family requesting physician contact today:  no  Discharge plan:   To continue to stabilize the pt's mood and symptoms   Guns in the home:  no   Outpatient provider(s):  None currently     Participating treatment team members: Angi Reeder, * (assigned SW), Jeremy Lazar LMSW

## 2021-12-15 PROCEDURE — 65220000003 HC RM SEMIPRIVATE PSYCH

## 2021-12-15 PROCEDURE — 74011250637 HC RX REV CODE- 250/637: Performed by: OBSTETRICS & GYNECOLOGY

## 2021-12-15 PROCEDURE — 74011250637 HC RX REV CODE- 250/637: Performed by: INTERNAL MEDICINE

## 2021-12-15 PROCEDURE — 74011250637 HC RX REV CODE- 250/637: Performed by: PSYCHIATRY & NEUROLOGY

## 2021-12-15 RX ADMIN — ACETAMINOPHEN 650 MG: 325 TABLET ORAL at 18:45

## 2021-12-15 RX ADMIN — PRENATAL VIT W/ FE FUMARATE-FA TAB 27-0.8 MG 1 TABLET: 27-0.8 TAB at 09:59

## 2021-12-15 RX ADMIN — BUDESONIDE AND FORMOTEROL FUMARATE DIHYDRATE 2 PUFF: 160; 4.5 AEROSOL RESPIRATORY (INHALATION) at 08:00

## 2021-12-15 RX ADMIN — LURASIDONE HYDROCHLORIDE 20 MG: 20 TABLET, FILM COATED ORAL at 18:40

## 2021-12-15 RX ADMIN — BUDESONIDE AND FORMOTEROL FUMARATE DIHYDRATE 2 PUFF: 160; 4.5 AEROSOL RESPIRATORY (INHALATION) at 20:00
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
## 2021-12-15 NOTE — PROGRESS NOTES
Progress Note  Date:2021       Room:[facility]  Patient Chente Anna     Date of Birth:[birth year]     Age:41 y.o. Subjective    Subjective do not know what is happening do not understand what is happening  Review of Systems  Objective         Vitals Last 24 Hours:  TEMPERATURE:  Temp  Av.1 °F (36.7 °C)  Min: 98.1 °F (36.7 °C)  Max: 98.1 °F (36.7 °C)  RESPIRATIONS RANGE: Resp  Av  Min: 18  Max: 18  PULSE OXIMETRY RANGE: SpO2  Av %  Min: 100 %  Max: 100 %  PULSE RANGE: Pulse  Av.7  Min: 84  Max: 89  BLOOD PRESSURE RANGE: Systolic (36NWD), XZV:679 , Min:103 , WZC:086   ; Diastolic (23PEU), QPS:17, Min:69, Max:69    I/O (24Hr): No intake or output data in the 24 hours ending 21  Objective  Labs/Imaging/Diagnostics    Labs:  CBC:Recent Labs     21  1004   WBC 17.8*   RBC 4.74   HGB 13.2   HCT 41.5   MCV 87.6   RDW 14.6*        CHEMISTRIES:  Recent Labs     21  1004      K 4.0      CO2 25   BUN 8   CA 9.5   PT/INR:No results for input(s): INR, INREXT in the last 72 hours. No lab exists for component: PROTIME  APTT:No results for input(s): APTT in the last 72 hours. LIVER PROFILE:  Recent Labs     21  1004   AST 16   ALT 26     Lab Results   Component Value Date/Time    ALT (SGPT) 26 2021 10:04 AM    AST (SGOT) 16 2021 10:04 AM    Alk. phosphatase 116 2021 10:04 AM    Bilirubin, total 0.6 2021 10:04 AM       Imaging Last 24 Hours:  No results found.   Assessment//Plan   Active Problems:    Anxiety (2021)      Depression (2021)      Polysubstance abuse ([address] (2021)      Amenorrhea (2021)      Positive blood pregnancy test (2021)      Assessment & Plan    Electronically signed by Erica Wynn MD on 2021 at 10:33 PM

## 2021-12-15 NOTE — BH NOTES
Patient sleeping at this time but awoke without difficulty. Patient anxious, but able to represent herself as calm, cooperative and pleasant. Med-diet compliant. Did not participate in snack time this evening. Denies SI,HI and AVH's. Mood-sad. Affect-flat. Conversation-pressured. Thoughts-patient is pre-occupied with many of her stressors that are occurring at this time. Stays to herself. Sleeps frequently. Quiet. Does c/o generalized aches and discomofrt, but declined any pain management at this time. Ambulatory. Able to make all needs known.

## 2021-12-15 NOTE — GROUP NOTE
[facility]  GROUP DOCUMENTATION INDIVIDUAL                                                                          Group Therapy Note    Date: 12/15/2021    Group Start Time: 1115  Group End Time: 1200  Group Topic: Education Group - Inpatient    SRM 2 BEHA HLTH ACUTE    Viveca Crest    [facility] GROUP DOCUMENTATION GROUP    Group Therapy Note  The therapist facilitated a group on safety planning. She explained what each section of the worksheet and encouraged the pt's to share their warning signs and coping mechanisms. Attendees: 7         Attendance: Attended    Patient's Goal:  To attend groups and activities     Interventions/techniques: Supported    Follows Directions: Followed directions    Interactions: Interacted appropriately    Mental Status: Calm    Behavior/appearance: Attentive and Cooperative    Goals Achieved: Able to engage in interactions and Able to listen to others      Additional Notes: The pt came to group late due to meeting with her doctor. She was taking notes on what the therapist was saying.      Camila Ignacio

## 2021-12-15 NOTE — PROGRESS NOTES
Consult    Patient: Asuncion Ramos MRN: [id number]  SSN: [id number]    YOB: 1980  Age: 37 y.o. Sex: female      Subjective:      Asuncion Ramos is a 37 y.o. female who is being seen for anxiety depression, drug abuse, PTSD, has a cough. Smokes cigarettes    Past Medical History:   Diagnosis Date    Anxiety     Depression     Drug abuse ([address]     PTSD (post-traumatic stress disorder)      History reviewed. No pertinent surgical history. History reviewed. No pertinent family history. Social History     Tobacco Use    Smoking status: Heavy Tobacco Smoker     Packs/day: 1.00    Smokeless tobacco: Never Used   Substance Use Topics    Alcohol use: Yes     Comment: rarely      Current Facility-Administered Medications   Medication Dose Route Frequency Provider Last Rate Last Admin    albuterol (PROVENTIL HFA, VENTOLIN HFA, PROAIR HFA) inhaler 2 Puff  2 Puff Inhalation Q6H PRN Lavon AARON MD   2 Puff at 12/14/21 1326    lurasidone (LATUDA) tablet 20 mg  20 mg Oral DAILY WITH Alondra AARON MD   20 mg at 12/14/21 1816    prenatal vit no.130-iron-folic tablet 1 Tablet  1 Tablet Oral DAILY Terrie Herman MD   1 Tablet at 12/15/21 0959    budesonide-formoteroL (SYMBICORT) 160-4.5 mcg/actuation HFA inhaler 2 Puff  2 Puff Inhalation BID RT Denise Styles MD   2 Puff at 12/15/21 0800    guaiFENesin (ROBITUSSIN) 100 mg/5 mL oral liquid 100 mg  100 mg Oral Q4H PRN Cornelia OWEN MD        acetaminophen (TYLENOL) tablet 650 mg  650 mg Oral Q4H PRN Siobhan Kumar MD   650 mg at 12/13/21 1691    magnesium hydroxide (MILK OF MAGNESIA) 400 mg/5 mL oral suspension 30 mL  30 mL Oral DAILY PRN Siobhan Kumar MD            Allergies   Allergen Reactions    Nickel Rash       Review of Systems:  A comprehensive review of systems was negative except for that written in the History of Present Illness.     Objective:     Vitals:    12/14/21 0800 12/14/21 0804 12/14/21 2041 12/15/21 0748   BP: 110/69 110/69 103/69 106/72   Pulse: 84 84 89 83   Resp:  18 18 18   Temp:  98.1 °F (36.7 °C) 98.1 °F (36.7 °C) 98 °F (36.7 °C)   SpO2:   100%    Weight:       Height:            Physical Exam:  General:  Alert, cooperative, no distress, appears stated age. Eyes:  Conjunctivae/corneas clear. PERRL, EOMs intact. Fundi benign   Ears:  Normal TMs and external ear canals both ears. Nose: Nares normal. Septum midline. Mucosa normal. No drainage or sinus tenderness. Mouth/Throat: Lips, mucosa, and tongue normal. Teeth and gums normal.   Neck: Supple, symmetrical, trachea midline, no adenopathy, thyroid: no enlargment/tenderness/nodules, no carotid bruit and no JVD. Back:   Symmetric, no curvature. ROM normal. No CVA tenderness. Lungs:    Prolonged expiration to auscultation bilaterally. Heart:  Regular rate and rhythm, S1, S2 normal, no murmur, click, rub or gallop. Abdomen:   Soft, non-tender. Bowel sounds normal. No masses,  No organomegaly. Extremities: Extremities normal, atraumatic, no cyanosis or edema. Pulses: 2+ and symmetric all extremities. Skin: Skin color, texture, turgor normal. No rashes or lesions   Lymph nodes: Cervical, supraclavicular, and axillary nodes normal.   Neurologic: CNII-XII intact. Normal strength, sensation and reflexes throughout. No results found for this or any previous visit (from the past 24 hour(s)). Assessment:     Hospital Problems  Never Reviewed          Codes Class Noted POA    Polysubstance abuse ([facility] ICD-10-CM: F19.10  ICD-9-CM: 305.90  12/14/2021 Unknown        Amenorrhea ICD-10-CM: N91.2  ICD-9-CM: 626.0  12/14/2021 Unknown        Positive blood pregnancy test ICD-10-CM: Z32.01  ICD-9-CM: V72.42  12/14/2021 Unknown        Anxiety ICD-10-CM: F41.9  ICD-9-CM: 300.00  12/12/2021 Unknown        Depression ICD-10-CM: F32. A  ICD-9-CM: 299  12/12/2021 Unknown          COPD with acute extubation      Plan:     Result albuterol on Flovent    Signed By: Segundo Corley MD     December 15, 2021

## 2021-12-16 LAB
BASOPHILS # BLD: 0.1 K/UL (ref 0–0.1)
BASOPHILS NFR BLD: 1 % (ref 0–1)
DIFFERENTIAL METHOD BLD: ABNORMAL
EOSINOPHIL # BLD: 0.1 K/UL (ref 0–0.4)
EOSINOPHIL NFR BLD: 1 % (ref 0–7)
ERYTHROCYTE [DISTWIDTH] IN BLOOD BY AUTOMATED COUNT: 14.9 % (ref 11.5–14.5)
HCT VFR BLD AUTO: 40.7 % (ref 35–47)
HGB BLD-MCNC: 12.8 G/DL (ref 11.5–16)
IMM GRANULOCYTES # BLD AUTO: 0.1 K/UL (ref 0–0.04)
IMM GRANULOCYTES NFR BLD AUTO: 1 % (ref 0–0.5)
LYMPHOCYTES # BLD: 1.4 K/UL (ref 0.8–3.5)
LYMPHOCYTES NFR BLD: 10 % (ref 12–49)
MCH RBC QN AUTO: 28 PG (ref 26–34)
MCHC RBC AUTO-ENTMCNC: 31.4 G/DL (ref 30–36.5)
MCV RBC AUTO: 89.1 FL (ref 80–99)
MONOCYTES # BLD: 1 K/UL (ref 0–1)
MONOCYTES NFR BLD: 8 % (ref 5–13)
NEUTS SEG # BLD: 10.8 K/UL (ref 1.8–8)
NEUTS SEG NFR BLD: 79 % (ref 32–75)
NRBC # BLD: 0 K/UL (ref 0–0.01)
NRBC BLD-RTO: 0 PER 100 WBC
PLATELET # BLD AUTO: 391 K/UL (ref 150–400)
PMV BLD AUTO: 9.9 FL (ref 8.9–12.9)
RBC # BLD AUTO: 4.57 M/UL (ref 3.8–5.2)
WBC # BLD AUTO: 13.5 K/UL (ref 3.6–11)

## 2021-12-16 PROCEDURE — 74011250637 HC RX REV CODE- 250/637: Performed by: PSYCHIATRY & NEUROLOGY

## 2021-12-16 PROCEDURE — 85025 COMPLETE CBC W/AUTO DIFF WBC: CPT

## 2021-12-16 PROCEDURE — 74011250637 HC RX REV CODE- 250/637: Performed by: OBSTETRICS & GYNECOLOGY

## 2021-12-16 PROCEDURE — 87086 URINE CULTURE/COLONY COUNT: CPT

## 2021-12-16 PROCEDURE — 65220000003 HC RM SEMIPRIVATE PSYCH

## 2021-12-16 PROCEDURE — 74011250637 HC RX REV CODE- 250/637: Performed by: INTERNAL MEDICINE

## 2021-12-16 RX ORDER — AMOXICILLIN 500 MG/1
500 CAPSULE ORAL EVERY 8 HOURS
Status: DISCONTINUED | OUTPATIENT
Start: 2021-12-16 | End: 2021-12-22 | Stop reason: HOSPADM

## 2021-12-16 RX ADMIN — ACETAMINOPHEN 650 MG: 325 TABLET ORAL at 13:48

## 2021-12-16 RX ADMIN — ACETAMINOPHEN 650 MG: 325 TABLET ORAL at 21:19

## 2021-12-16 RX ADMIN — LURASIDONE HYDROCHLORIDE 40 MG: 40 TABLET, FILM COATED ORAL at 17:46

## 2021-12-16 RX ADMIN — BUDESONIDE AND FORMOTEROL FUMARATE DIHYDRATE 2 PUFF: 160; 4.5 AEROSOL RESPIRATORY (INHALATION) at 21:20

## 2021-12-16 RX ADMIN — PRENATAL VIT W/ FE FUMARATE-FA TAB 27-0.8 MG 1 TABLET: 27-0.8 TAB at 10:25

## 2021-12-16 RX ADMIN — BUDESONIDE AND FORMOTEROL FUMARATE DIHYDRATE 2 PUFF: 160; 4.5 AEROSOL RESPIRATORY (INHALATION) at 09:42

## 2021-12-16 RX ADMIN — AMOXICILLIN 500 MG: 500 CAPSULE ORAL at 21:19

## 2021-12-16 RX ADMIN — AMOXICILLIN 500 MG: 500 CAPSULE ORAL at 15:38

## 2021-12-16 NOTE — GROUP NOTE
[facility] GROUP DOCUMENTATION INDIVIDUAL                                                                          Group Therapy Note    Date: 12/16/2021    Group Start Time: 1123  Group End Time: 1200  Group Topic: Education Group - Inpatient    SRM 2  NON ACUTE    Eder Hemarybel    [facility] GROUP DOCUMENTATION GROUP    Group Therapy Note    Facilitated discussion focused on being aware of different feelings and their triggers and changes that need to be made to experience more comfortable feelings and less uncomfortable feelings    Attendees: 5/10         Attendance: Attended    Patient's Goal:  Attend group daily     Interventions/techniques: Informed and Supported    Follows Directions: Followed directions    Interactions: Interacted appropriately    Mental Status: Depressed and Flat    Behavior/appearance: Negative    Goals Achieved: Able to engage in interactions, Able to listen to others, Able to self-disclose, Identified feelings and Identified triggers      Additional Notes:  Receptive to information for a few minutes.  Pt stated \"she really don't feel like being in group but she is here\" Pt stated \"she just want to go home\" Pt left group and didn't come back\"    Raman Subramanian

## 2021-12-16 NOTE — PROGRESS NOTES
60-year-old patient with depression. Has leukocytosis possible UTI.   Urine culture report not back we will repeat UA and CNS  Obtain CBC and treat empirically with amoxicillin

## 2021-12-16 NOTE — GROUP NOTE
[facility]  GROUP DOCUMENTATION INDIVIDUAL                                                                          Group Therapy Note    Date: 12/15/2021    Group Start Time: 1920  Group End Time: 2005  Group Topic: Reflection/Relaxation    SRM 2 BH NON ACUTE    Emy Seek    [facility] GROUP DOCUMENTATION GROUP    Group Therapy Note    Attendees: 5/9    Facilitated structured group to introduce positive way to cope and manage daily stressors. Introduced relaxation technique to introduce healthy way to cope and manage stressors in group. Attendance: Attended    Patient's Goal:  STG: Attends activities and groups      Interventions/techniques: Art integration and Provide feedback    Follows Directions: Followed directions    Interactions: Interacted appropriately    Mental Status: Calm and Flat    Behavior/appearance: Attentive and Cooperative    Goals Achieved: Able to engage in interactions and Able to listen to others      Additional Notes: Attended group and was receptive to intervention. Received material provided in group. Participated in relaxation technique of listening to instrumentals and coloring art task and puzzles. Verbalized enjoyment of group. Attended entire session.      Jazz Perez, CTRS

## 2021-12-16 NOTE — GROUP NOTE
[facility]  GROUP DOCUMENTATION INDIVIDUAL                                                                          Group Therapy Note    Date: 12/16/2021    Group Start Time: 8801  Group End Time: 1500  Group Topic: AA/NA    SRM 2 BEHA HLTH ACUTE    Jersey Genin    [facility] GROUP DOCUMENTATION GROUP    Group Therapy Note  The therapist facilitated a group on different relapse prevention strategies.      Attendees: 5         Attendance: {[facility] DOC ATTENDANCE:11280}    Patient's Goal:  ***    Interventions/techniques: {[facility] GROUP DOC INTERVENTIONS/TECHNIQUES:31621}    Follows Directions: {[facility] FOLLOWS DIRECTION:51217}    Interactions: {[facility] INTERACTIONS:67748}    Mental Status: {[facility] GROUP DOC MENTAL STATUS:76641}    Behavior/appearance: {[facility] GROUP DOC BEHAVIOR/APPEARANCE:46379}    Goals Achieved: {[facility] GROUP DOC GOALS ACHIEVED:00969}      Additional Notes:  ***    Kathia Ramirez

## 2021-12-16 NOTE — BH NOTES
Patient has been up for meals on the unit. She was very tearful  This am she was upset by what a male peer . She was fuss about tech doing a group that she was not suppose to be doing. She c/o It being 1010 & snacks was suppose to be @ 1000. It was explained that snack would be served. She has been irritable. She later apologized about her behavior. Urine was obtained for c&s as ordered. Pt. Has had several phones calls from  Her male friend. She has verbalized her depression & anxiety 10 due to being in the hospital. She stated\" I sighed myself in I should be able to sing myself out. She has attended some groups. She has been medication compliant. She has remains safe. She states she does not want to be here for [place]. She remains on close observation.
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
## 2021-12-16 NOTE — GROUP NOTE
DIANA  GROUP DOCUMENTATION INDIVIDUAL                                                                          Group Therapy Note    Date: 12/16/2021    Group Start Time: 1000  Group End Time: 4819  Group Topic: [employer]    SRM 2 BEHA HLTH ACUTE    Starleen Mosotho    IP [address] [facility] GROUP DOCUMENTATION GROUP    Group Therapy Note    Attendees: 6         Attendance: {[facility] GROUP DOC ATTENDANCE:32768}    Patient's Goal:  ***    Interventions/techniques: {GH GROUP DOC INTERVENTIONS/TECHNIQUES:60412}    Follows Directions: {[facility] GROUP DOC FOLLOWS DIRECTION:69343}    Interactions: {[facility] GROUP DOC INTERACTIONS:42824}    Mental Status: {[facility] GROUP DOC MENTAL STATUS:53144}    Behavior/appearance: {[facility] GROUP DOC BEHAVIOR/APPEARANCE:77491}    Goals Achieved: {GH GROUP DOC GOALS ACHIEVED:71119}      Additional Notes:  ***    Kareem Harper

## 2021-12-16 NOTE — GROUP NOTE
IP  GROUP DOCUMENTATION INDIVIDUAL                                                                          Group Therapy Note    Date: 12/16/2021    Group Start Time: 0370  Group End Time: 5431  Group Topic: Recreational/Music Therapy    SRM 2  NON ACUTE    Jessica Larsen    IP [address] [facility] GROUP DOCUMENTATION GROUP    Group Therapy Note    Facilitated leisure skills group to reinforce positive coping through music, social interaction, group activities and arts/crafts    Attendees: 6/9         Attendance: Attended    Patient's Goal:  Attend group daily     Interventions/techniques: Art integration and Supported    Follows Directions: Followed directions    Interactions: Interacted appropriately    Mental Status: Calm    Behavior/appearance: Cooperative    Goals Achieved: Able to engage in interactions and Able to listen to others      Additional Notes:  Receptive to listening to music and songs she selected while working on leisure task.  Interacted with peers and staff when prompted    Bianca Cintron

## 2021-12-16 NOTE — GROUP NOTE
DIANA  GROUP DOCUMENTATION INDIVIDUAL                                                                          Group Therapy Note    Date: 12/16/2021    Group Start Time: 1000  Group End Time: 8205  Group Topic: [employer]    SRM 2 BEHA HLTH ACUTE    Nestor Loser    IP [facility] GROUP DOCUMENTATION GROUP    Group Therapy Note    Attendees: 6       Attendance: Attended    Patient's Goal:  Go home    Interventions/techniques: Challenged    Follows Directions:  Followed directions    Interactions: Disorganized interaction    Mental Status: Elevated    Behavior/appearance: Needed prompting    Goals Achieved: Able to receive feedback      Additional Notes:      Deon Walterst
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
## 2021-12-16 NOTE — BH NOTES
Pt slept well during the night she woke up once for about 20 mins then was able to return to sleep. Pt. Tearful while talking with nurse about possibly being here for the holidays. She does not believe she is pregnant. Endorses anxiety and depression unable to rate. Denies SI/HI or hallucinations. Pt. In no distress respirations regular and unlabored. Will continue to round closely Q15 mins per unit protocol.

## 2021-12-16 NOTE — GROUP NOTE
DIANA  GROUP DOCUMENTATION INDIVIDUAL                                                                          Group Therapy Note    Date: 12/16/2021    Group Start Time: 9988  Group End Time: 1400  Group Topic: Process Group - Inpatient    SRM CARE MANAGEMENT    Aida Porras BSW    [facility] GROUP DOCUMENTATION GROUP    Group Therapy Note    The facilitator encouraged the group to engage in a worksheet activity and a game. As well as promote feedback and support for one another. Attendees: 3/11         Attendance: Did not attend    Additional Notes: The patient was encouraged to come but did not attend.      JOSE Merchant, AURELIANOW

## 2021-12-17 ENCOUNTER — APPOINTMENT (OUTPATIENT)
Dept: ULTRASOUND IMAGING | Age: 41
DRG: 885 | End: 2021-12-17
Attending: INTERNAL MEDICINE
Payer: MEDICARE

## 2021-12-17 PROCEDURE — 74011250637 HC RX REV CODE- 250/637: Performed by: INTERNAL MEDICINE

## 2021-12-17 PROCEDURE — 65220000003 HC RM SEMIPRIVATE PSYCH

## 2021-12-17 PROCEDURE — 74011250637 HC RX REV CODE- 250/637: Performed by: PSYCHIATRY & NEUROLOGY

## 2021-12-17 PROCEDURE — 74011250637 HC RX REV CODE- 250/637: Performed by: OBSTETRICS & GYNECOLOGY

## 2021-12-17 PROCEDURE — 76770 US EXAM ABDO BACK WALL COMP: CPT

## 2021-12-17 RX ADMIN — PRENATAL VIT W/ FE FUMARATE-FA TAB 27-0.8 MG 1 TABLET: 27-0.8 TAB at 18:01

## 2021-12-17 RX ADMIN — LURASIDONE HYDROCHLORIDE 40 MG: 40 TABLET, FILM COATED ORAL at 18:01

## 2021-12-17 RX ADMIN — ACETAMINOPHEN 650 MG: 325 TABLET ORAL at 18:01

## 2021-12-17 RX ADMIN — AMOXICILLIN 500 MG: 500 CAPSULE ORAL at 06:50

## 2021-12-17 RX ADMIN — BUDESONIDE AND FORMOTEROL FUMARATE DIHYDRATE 2 PUFF: 160; 4.5 AEROSOL RESPIRATORY (INHALATION) at 08:43

## 2021-12-17 RX ADMIN — AMOXICILLIN 500 MG: 500 CAPSULE ORAL at 18:01

## 2021-12-17 RX ADMIN — BUDESONIDE AND FORMOTEROL FUMARATE DIHYDRATE 2 PUFF: 160; 4.5 AEROSOL RESPIRATORY (INHALATION) at 21:57

## 2021-12-17 NOTE — PROGRESS NOTES
Problem: Depressed Mood (Adult/Pediatric)  Goal: *STG: Remains safe in hospital  Outcome: Progressing Towards Goal  Goal: *STG: Complies with medication therapy  Outcome: Progressing Towards Goal     Problem: Psychosis  Goal: *STG: Remains safe in hospital  Outcome: Progressing Towards Goal  Goal: *STG/LTG: Complies with medication therapy  Outcome: Progressing Towards Goal Recommendations    Recommendation:  1. Continue regular diet, encourage good PO intake of high calorie high protein foods   2. Continue boost glucose to increase intake    -If pt dislikes ONS, add boost pudding to increase intake   3. RD to monitor and follow up     Goals: pt to tolerate >85% of EEN/EPN by RD follow up   Nutrition Goal Status: progressing towards goal  Communication of RD Recs: other (comment)(POC)

## 2021-12-17 NOTE — PROGRESS NOTES
Progress Note  Date:2021       Room:[facility]  Patient Moose Webster     Date of Birth:[birth year]     Age:41 y.o. Subjective    Subjective wants togome notgeting prenatal vitaminssnack  ontime  Review of Systems  Objective         Vitals Last 24 Hours:  TEMPERATURE:  Temp  Av °F (36.7 °C)  Min: 97.9 °F (36.6 °C)  Max: 98.1 °F (36.7 °C)  RESPIRATIONS RANGE: Resp  Av  Min: 18  Max: 18  PULSE OXIMETRY RANGE: SpO2  Av %  Min: 100 %  Max: 100 %  PULSE RANGE: Pulse  Av  Min: 88  Max: 90  BLOOD PRESSURE RANGE: Systolic (02RSV), JRC:145 , Min:106 , BXW:757   ; Diastolic (36SKU), PREET, Min:68, Max:71    I/O (24Hr): No intake or output data in the 24 hours ending 216  Objective  Labs/Imaging/Diagnostics    Labs:  CBC:Recent Labs     21  1340   WBC 13.5*   RBC 4.57   HGB 12.8   HCT 40.7   MCV 89.1   RDW 14.9*        CHEMISTRIES:No results for input(s): NA, K, CL, CO2, BUN, CA, PHOS, MG in the last 72 hours. No lab exists for component: CREATININE, GLUCOSEPT/INR:No results for input(s): INR, INREXT in the last 72 hours. No lab exists for component: PROTIME  APTT:No results for input(s): APTT in the last 72 hours. LIVER PROFILE:No results for input(s): AST, ALT in the last 72 hours. No lab exists for component: Noelleon Ayad ALKPHOS  Lab Results   Component Value Date/Time    ALT (SGPT) 26 2021 10:04 AM    AST (SGOT) 16 2021 10:04 AM    Alk. phosphatase 116 2021 10:04 AM    Bilirubin, total 0.6 2021 10:04 AM       Imaging Last 24 Hours:  No results found.   Assessment//Plan   Active Problems:    Anxiety (2021)      Depression (2021)      Polysubstance abuse ([address] (2021)      Amenorrhea (2021)      Positive blood pregnancy test (2021)      Assessment & Plan,, patient case discussed in the treatment team patient seen patient having a difficult time this morning angry evident isolated depressed anger displaced being here even though she has difficulty dealing with her pregnancy making decisions. She is complaining that she was not getting any prenatal vitamins and medications she started getting lurasidone since yesterday and I am sorry this morning and also getting prenatal vitamins. Patient felt that she did not get her snack exactly all the time.   Patient displacing her anger late in the evening she did if discharged she still labile irritable overwhelmed think we should call [facility] for consideration assessment for TDO from her Ana Maria Odette has been increased to 40 mg she definitely labile irritable some paranoia and confusion agitation noted she is not ready to be discharged in my professional opinion will call D 19 continued inpatient level of care indicated no suicidal thoughts or homicidal thoughts expressed some paranoia confusion mood lability depression and anger evident    Electronically signed by Raulito Tovar MD on 12/16/2021 at 10:36 PM

## 2021-12-17 NOTE — BH NOTES
Pt is calm, cooperative and med compliant. Pt does deny all depression, anxiety, SI, HI and hallucinations. Pt still says she does not believe she is pregnant but complains of back and stomach pains, requested tylenol which was given with her scheduled amoxicillin at 9pm and she has been in bed since then. She has been resting well tonight, respirations regular and unlabored, no distress noted. Pt will remain on close observation every 15 mins as per unit protocol.

## 2021-12-17 NOTE — GROUP NOTE
IP  GROUP DOCUMENTATION INDIVIDUAL                                                                          Group Therapy Note    Date: 12/17/2021    Group Start Time: 1320  Group End Time: 8853  Group Topic: Recreational/Music Therapy    SRM 2  NON ACUTE    Kelly Cushing    IP [address] [facility] GROUP DOCUMENTATION GROUP    Group Therapy Note    Facilitated leisure skills group to reinforce positive coping through music, social interaction, group activities and arts/crafts    Attendees: 9/12         Attendance: Attended    Patient's Goal:  Attend group daily     Interventions/techniques: Art integration and Supported    Follows Directions: Followed directions    Interactions: Interacted appropriately    Mental Status: Calm    Behavior/appearance: Cooperative    Goals Achieved: Able to engage in interactions and Able to listen to others      Additional Notes:  Receptive to listening to music and  a song she selected while working on leisure task.  Interacted with peers and staff      Sharif Hobbs

## 2021-12-17 NOTE — BH NOTES
Client is pleasant and cooperative. Alert and oriented x 3. Appearance is semi-tidy. She has a good appetite and reports sleeping well. Denies feeling suicidal or homicidal.Takes meds as prescribed and denies any side effects. Remains on close observation for safety. e
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
## 2021-12-17 NOTE — GROUP NOTE
DIANA  GROUP DOCUMENTATION INDIVIDUAL                                                                          Group Therapy Note    Date: 12/17/2021    Group Start Time: 1115  Group End Time: 1200  Group Topic: Process Group - Inpatient    SRM CARE MANAGEMENT    Aida Porras BSW    [facility] GROUP DOCUMENTATION GROUP    Group Therapy Note    The facilitator engaged the group in a game and promoted feedback and support for one another. Attendees: 9/12         Attendance: Did not attend    Additional Notes: The patient was encouraged to come to group but did not attend.         COLTON Small

## 2021-12-17 NOTE — BH NOTES
Spoke with D19 who had just screened pt as the pt was wanting to leave AMA. D19 stated they spoke with Dr. Vanesa Elkins and that he did not feel the pt was ready for d/c today as she was still expressing delusional and paranoid ideation to him, but possibly Monday. D19 requested this nurse to relay to the pt that if she agreed to stay voluntarily, the Dr. Marlene Pierre d/c her on Monday, or otherwise they would proceed to recommend TDO. Upon hearing this, the pt began crying, and screaming and punching her fist in her hand persistently. It was attempted to reason with the pt but pt accelerated with her emotions and started screaming and blaming staff for these accelerated emotions and pt could not be reasoned with. Pt chose to not make a decision regarding her status as she felt she had no choice. Interaction was relayed to [place] with D19.

## 2021-12-17 NOTE — BH NOTES
At 16:30 patient was assessed by d-Marcia, after this meeting she began to escalate, refused to calm down, continued to escalate on the phone, blaming other party, yelling  \"It's your fault! \". At 17:40 patient was quietly sitting in her room socializing with her room mate, she was asked to come to the nursing station for medications. Patient doubled over in the jones with complaints of sharp shooting pain from left lower abdomen to her lower back. Stating \"It feels like someone stabbing me. \"  Vital signs:  Blood pressure 135/75, pulse 97, respirations 24, temperature 98.6, with a pulse ox of 98%. Dr. Branden Gerard notified at 17:45, and saw patient at 17:50. Patient denied vaginal discharge and bleeding. Medications given along with tylenol for c/o pain. Orders were received for an OB GYN consult. Dr. Joyce Samuel paged. Dr. Lucero Anglin notified at 18:16. Dr Joyce Samuel notified at 18:27 of consult, orders received for Quat. HCG in 48 hours. Patient remains on close observation, Q 15 minute checks. Patient remains on close observation, Q 15 minute checks.

## 2021-12-17 NOTE — GROUP NOTE
IP  GROUP DOCUMENTATION INDIVIDUAL                                                                          Group Therapy Note    Date: 12/17/2021    Group Start Time: 1120  Group End Time: 1200  Group Topic: Education Group - Inpatient    SRM 2  NON ACUTE    Day Cadena    IP [facility] GROUP DOCUMENTATION GROUP    Group Therapy Note     Facilitated discussion focus on identifying different barriers/defense mechanisms that has prevented progress and identifying ways to confront them    Attendees: 8/10         Attendance: Attended    Patient's Goal:  Attend group daily     Interventions/techniques: Informed and Supported    Follows Directions:  Followed directions    Interactions: Interacted appropriately    Mental Status: Calm    Behavior/appearance: Cooperative    Goals Achieved: Able to engage in interactions, Able to listen to others and Able to self-disclose      Additional Notes:   Receptive to information discussed on different barriers and was able to share \"isolating, fear of the unknown, wearing a mask and pleasing others\" as her biggest barriers and shared they prevent her from [place] for help, being able to move on and having confidence\"  Pt shared  she can confront these barriers by \"working on herself\"    Neto Patino

## 2021-12-17 NOTE — PROGRESS NOTES
Progress Note    Patient: Wenda Baumgarten MRN: [id number]  SSN: [id number]    YOB: 1980  Age: 37 y.o. Sex: female      Admit Date: 12/12/2021    LOS: 5 days     Subjective:     37years old patient who complains of left lower quadrant abdominal pain. Denies any nausea no vomiting denies any diarrhea denies any fever or chills. She has UTI has been on Amoxil culture results pending. Pregnancy test is positive patient is sexually active    Objective:     Vitals:    12/16/21 0800 12/16/21 1950 12/17/21 0806 12/17/21 0915   BP: 124/71 106/68 (!) 105/51 (!) 105/51   Pulse: 88 90 88 88   Resp: 18 18 18 18   Temp: 98.1 °F (36.7 °C) 97.9 °F (36.6 °C) 98.2 °F (36.8 °C) 98.2 °F (36.8 °C)   SpO2:  100%     Weight:       Height:            Intake and Output:  Current Shift: No intake/output data recorded. Last three shifts: No intake/output data recorded. Physical Exam:   General:  Alert, cooperative, no distress, appears stated age. Eyes:  Conjunctivae/corneas clear. PERRL, EOMs intact. Fundi benign   Ears:  Normal TMs and external ear canals both ears. Nose: Nares normal. Septum midline. Mucosa normal. No drainage or sinus tenderness. Mouth/Throat: Lips, mucosa, and tongue normal. Teeth and gums normal.   Neck: Supple, symmetrical, trachea midline, no adenopathy, thyroid: no enlargment/tenderness/nodules, no carotid bruit and no JVD. Back:   Symmetric, no curvature. ROM normal. No CVA tenderness. Lungs:   Clear to auscultation bilaterally. Heart:  Regular rate and rhythm, S1, S2 normal, no murmur, click, rub or gallop. Abdomen:   Soft,++tender left lower quadrant. Bowel sounds normal. No masses,  No organomegaly. Extremities: Extremities normal, atraumatic, no cyanosis or edema. Pulses: 2+ and symmetric all extremities. Skin: Skin color, texture, turgor normal. No rashes or lesions   Lymph nodes: Cervical, supraclavicular, and axillary nodes normal.   Neurologic: CNII-XII intact. Normal strength, sensation and reflexes throughout. Lab/Data Review: All lab results for the last 24 hours reviewed. No results found for this or any previous visit (from the past 24 hour(s)).       Assessment:     Active Problems:    Anxiety (12/12/2021)      Depression (12/12/2021)      Polysubstance abuse ([address] (12/14/2021)      Amenorrhea (12/14/2021)      Positive blood pregnancy test (12/14/2021)      UTI culture pending  Left lower abdominal: Pain  Possible pregnancy consult gynecologist    Plan:     Obtain renal ultrasound  Discussed with nursing staff may need to transfer to medical floor if pain persists    Signed By: Carlos Perera MD     December 17, 2021

## 2021-12-18 LAB
BACTERIA SPEC CULT: ABNORMAL
BASOPHILS # BLD: 0.1 K/UL (ref 0–0.1)
BASOPHILS NFR BLD: 1 % (ref 0–1)
COLONY COUNT,CNT: ABNORMAL
DIFFERENTIAL METHOD BLD: ABNORMAL
EOSINOPHIL # BLD: 0.1 K/UL (ref 0–0.4)
EOSINOPHIL NFR BLD: 1 % (ref 0–7)
ERYTHROCYTE [DISTWIDTH] IN BLOOD BY AUTOMATED COUNT: 15.1 % (ref 11.5–14.5)
HCT VFR BLD AUTO: 35.8 % (ref 35–47)
HGB BLD-MCNC: 11.3 G/DL (ref 11.5–16)
IMM GRANULOCYTES # BLD AUTO: 0.1 K/UL (ref 0–0.04)
IMM GRANULOCYTES NFR BLD AUTO: 1 % (ref 0–0.5)
LYMPHOCYTES # BLD: 1.8 K/UL (ref 0.8–3.5)
LYMPHOCYTES NFR BLD: 12 % (ref 12–49)
MCH RBC QN AUTO: 28.5 PG (ref 26–34)
MCHC RBC AUTO-ENTMCNC: 31.6 G/DL (ref 30–36.5)
MCV RBC AUTO: 90.2 FL (ref 80–99)
MONOCYTES # BLD: 1.1 K/UL (ref 0–1)
MONOCYTES NFR BLD: 7 % (ref 5–13)
NEUTS SEG # BLD: 12.3 K/UL (ref 1.8–8)
NEUTS SEG NFR BLD: 78 % (ref 32–75)
NRBC # BLD: 0 K/UL (ref 0–0.01)
NRBC BLD-RTO: 0 PER 100 WBC
PLATELET # BLD AUTO: 341 K/UL (ref 150–400)
PMV BLD AUTO: 9.6 FL (ref 8.9–12.9)
RBC # BLD AUTO: 3.97 M/UL (ref 3.8–5.2)
SPECIAL REQUESTS,SREQ: ABNORMAL
WBC # BLD AUTO: 15.5 K/UL (ref 3.6–11)

## 2021-12-18 PROCEDURE — 65220000003 HC RM SEMIPRIVATE PSYCH

## 2021-12-18 PROCEDURE — 74011250637 HC RX REV CODE- 250/637: Performed by: OBSTETRICS & GYNECOLOGY

## 2021-12-18 PROCEDURE — 74011250637 HC RX REV CODE- 250/637: Performed by: PSYCHIATRY & NEUROLOGY

## 2021-12-18 PROCEDURE — 74011250637 HC RX REV CODE- 250/637: Performed by: INTERNAL MEDICINE

## 2021-12-18 PROCEDURE — 36415 COLL VENOUS BLD VENIPUNCTURE: CPT

## 2021-12-18 PROCEDURE — 85025 COMPLETE CBC W/AUTO DIFF WBC: CPT

## 2021-12-18 RX ADMIN — AMOXICILLIN 500 MG: 500 CAPSULE ORAL at 17:08

## 2021-12-18 RX ADMIN — ACETAMINOPHEN 650 MG: 325 TABLET ORAL at 17:21

## 2021-12-18 RX ADMIN — PRENATAL VIT W/ FE FUMARATE-FA TAB 27-0.8 MG 1 TABLET: 27-0.8 TAB at 08:24

## 2021-12-18 RX ADMIN — LURASIDONE HYDROCHLORIDE 40 MG: 40 TABLET, FILM COATED ORAL at 17:10

## 2021-12-18 RX ADMIN — AMOXICILLIN 500 MG: 500 CAPSULE ORAL at 01:11

## 2021-12-18 RX ADMIN — AMOXICILLIN 500 MG: 500 CAPSULE ORAL at 09:49

## 2021-12-18 RX ADMIN — BUDESONIDE AND FORMOTEROL FUMARATE DIHYDRATE 2 PUFF: 160; 4.5 AEROSOL RESPIRATORY (INHALATION) at 08:25

## 2021-12-18 RX ADMIN — BUDESONIDE AND FORMOTEROL FUMARATE DIHYDRATE 2 PUFF: 160; 4.5 AEROSOL RESPIRATORY (INHALATION) at 21:01

## 2021-12-18 NOTE — GROUP NOTE
DIANA  GROUP DOCUMENTATION INDIVIDUAL                                                                          Group Therapy Note    Date: 12/18/2021    Group Start Time: 9512  Group End Time: 0940  Group Topic: [employer] Meeting    SRM 2  NON ACUTE    Angel, [address] [facility] GROUP DOCUMENTATION GROUP    Group Therapy Note    Attendees         Attendance: Attended    Patient's Goal:  Pt stated that she just wants to be happy. Interventions/techniques: Supported    Follows Directions:  Followed directions    Interactions: Interacted appropriately    Mental Status: Other Tired and lonely    Behavior/appearance: Cooperative    Goals Achieved: Able to engage in interactions, Able to listen to others and Identified feelings      Additional Notes:     Yves Oglesby

## 2021-12-18 NOTE — BH NOTES
Behavioral Health Treatment Team Note     Patient goal(s) for today: \"Continue to go to group and talk with my grand daughter\"  Treatment team focus/goals: Attend group and continue medication management     Progress note: The patient presented in bed and appeared to have done some grooming. She denied SI/HI/AVH at this time and was oriented x4. She described her mood as \"good\" and presented an elevated mood with an agitated affect. The patient was hyper verbal but redirectable, and expressed frustration over her TDO. She became tearful but was also thankful that she had been moved to the front as being in the back was stressful for her. The patient talked about missing the jaskaran season and that she really misses her family and that all she wants is to be with them The writer offered her words of encouragement and validated her feelings. And motivated her to keep attending groups and actively being apart of her treatment at the hospital. An inpatient level of care is necessary in order to stabilize the pt further on medications. LOS:  6  Expected LOS: 7-10 Days     Insurance info/prescription coverage:  [email or web address]   Date of last family contact:  Not made yet   Family requesting physician contact today:  no  Discharge plan:   To stabilize the pt's mood and symptoms further   Guns in the home:  no   Outpatient provider(s):  None currently   Participating treatment team members: Osman Daniels,   [address] [facility], [address] [place] [address]

## 2021-12-18 NOTE — PROGRESS NOTES
Problem: Depressed Mood (Adult/Pediatric)  Goal: *STG: Participates in treatment plan  Outcome: Progressing Towards Goal  Goal: *STG: Attends activities and groups  Outcome: Progressing Towards Goal  Goal: *STG: Complies with medication therapy  Outcome: Progressing Towards Goal     Problem: Psychosis  Goal: *STG: Decreased hallucinations  Outcome: Progressing Towards Goal  Goal: *STG: Decreased delusional thinking  Outcome: Progressing Towards Goal  Goal: *STG/LTG: Complies with medication therapy  Outcome: Progressing Towards Goal     Problem: Pregnancy and Addiction  Goal: *STG:Free of withdrawal symptoms  Outcome: Progressing Towards Goal   Pt cooperative  and pleasant with others. Pt has attended groups as scheduled with participation. Pt has denied feelings of anxiousness,  without noted behaviors. Pt has accepted meds as ordered. Pt requested and rec'd  Tylenol 650mg po at 6282 rated \"5\". Pt  reported she is having hardened BM, encouraged to increase  po intake and f/{[id number]::\"unit\",\"units\"} with attending as needed. Pt stated she did not know she was pregnant and \"feels some kind of way\" being pregnant at her age. Pt denied feeling anxious. Pt denied any burning when urinating but she does have frequency.

## 2021-12-18 NOTE — GROUP NOTE
[facility] GROUP DOCUMENTATION INDIVIDUAL                                                                          Group Therapy Note    Date: 12/18/2021    Group Start Time: 3304  Group End Time: 8221  Group Topic: Recreational/Music Therapy    SRM 2  NON ACUTE    [facility]    IP [facility] GROUP DOCUMENTATION GROUP    Group Therapy Note    Facilitated leisure skills group as a positive way to cope and manage mood through music and art task    Attendees: 9/12         Attendance: Attended    Patient's Goal:  Attend group daily     Interventions/techniques: Art integration and Supported    Follows Directions: Followed directions    Interactions: Interacted appropriately    Mental Status: Calm    Behavior/appearance: Cooperative    Goals Achieved: Able to engage in interactions and Able to listen to others      Additional Notes:  Receptive to listening to music and  a song she selected while working on leisure task. Interacted when prompted.      ALBINA TerrellS

## 2021-12-18 NOTE — GROUP NOTE
IP  GROUP DOCUMENTATION INDIVIDUAL                                                                          Group Therapy Note    Date: 12/18/2021    Group Start Time: 0940  Group End Time: 1910  Group Topic: Education Group - Inpatient    SRM 2 BH NON ACUTE    Tim Jackson    [facility] GROUP DOCUMENTATION GROUP    Group Therapy Note    Facilitated group session focused on introducing information on developing a thought record to help challenge negative thoughts and develop a more accurate view of a situation    Attendees: 9/12         Attendance: Attended    Patient's Goal:  Attend group daily     Interventions/techniques: Informed and Supported    Follows Directions: Followed directions    Interactions: Interacted appropriately    Mental Status: Calm    Behavior/appearance: Cooperative    Goals Achieved: Able to engage in interactions, Able to listen to others, Able to self-disclose and Identified distorted thoughts/beliefs      Additional Notes:  Receptive to information discussed on developing a thought record and was able to recognize examples of different negative thoughts.  Pt acknowledged thinking negative prior to admission and shared negative thought and was able to challenge it    Deneen Gonzalez, [address]

## 2021-12-18 NOTE — PROGRESS NOTES
Problem: Psychosis  Goal: *STG: Remains safe in hospital  Outcome: Progressing Towards Goal     Problem: Depressed Mood (Adult/Pediatric)  Goal: *STG: Complies with medication therapy  Outcome: Progressing Towards Goal     Problem: Depressed Mood (Adult/Pediatric)  Goal: *STG: Remains safe in hospital  Outcome: Progressing Towards Goal     Problem: Depressed Mood (Adult/Pediatric)  Goal: *STG: Attends activities and groups  Outcome: Progressing Towards Goal     Problem: Depressed Mood (Adult/Pediatric)  Goal: *STG: Demonstrates reduction in symptoms and increase in insight into coping skills/future focused  Outcome: Progressing Towards Goal

## 2021-12-18 NOTE — PROGRESS NOTES
Psychiatric Progress Note    Patient: Trudy Carias MRN: [id number]  SSN: [id number]    YOB: 1980  Age: 37 y.o. Sex: female      Admit Date: 12/12/2021       Subjective:     Trudy Carias  reports feeling less anxious, depressed as the 2nd day since arriving. Denies SI/HI/AH/VH. No aggression or violence. Appropriately interactive and aware. Tolerating medications well. Eating well and sleeping well. Case reviewed with nursing staff and no significant issues or events reported in the last 24 hours. Staff has observed patient interacting on the unit and attending group.     Objective:     Reviewed vital signs which are stable    Mental Status Exam:   Sensorium  Oriented to time, place, and situation   Relations Connects with interviewer and engages appropriately   Eye Contact Appropriate   Appearance:  Appropriate for venue and season   Speech:  Normal rate, tone, volume and pattern   Thought Process: Linear, logical and goal-directed   Thought Content Free of delusions, hallucinations and does not appear to be responding to internal stimuli   Suicidal ideations None active and contracts for safety   Mood:  Depressed and anxious   Affect:  Flat, constricted and mood congruent   Memory    Intact   Concentration:  Intact   Insight:   Fair/poor   Judgment:  Fair/poor     No signs of tardive dyskinesia or extrapyramidal symptoms    MEDICATIONS:  Current Facility-Administered Medications   Medication Dose Route Frequency    amoxicillin (AMOXIL) capsule 500 mg  500 mg Oral Q8H    lurasidone (LATUDA) tablet 40 mg  40 mg Oral DAILY WITH DINNER    albuterol (PROVENTIL HFA, VENTOLIN HFA, PROAIR HFA) inhaler 2 Puff  2 Puff Inhalation Q6H PRN    prenatal vit no.130-iron-folic tablet 1 Tablet  1 Tablet Oral DAILY    budesonide-formoteroL (SYMBICORT) 160-4.5 mcg/actuation HFA inhaler 2 Puff  2 Puff Inhalation BID RT    guaiFENesin (ROBITUSSIN) 100 mg/5 mL oral liquid 100 mg  100 mg Oral Q4H PRN    acetaminophen (TYLENOL) tablet 650 mg  650 mg Oral Q4H PRN    magnesium hydroxide (MILK OF MAGNESIA) 400 mg/5 mL oral suspension 30 mL  30 mL Oral DAILY PRN        DISCUSSION:  Discussed risk and benefits of medication, provided an opportunity to answer any questions, reviewed discharge goals. Lab/Data Review:  No results found for this or any previous visit (from the past 24 hour(s)). Assessment:     Active Problems:    Anxiety (12/12/2021)      Depression (12/12/2021)      Polysubstance abuse ([facility] [address] (12/14/2021)      Amenorrhea (12/14/2021)      Positive blood pregnancy test (12/14/2021)        Plan:     Continue current plan of care excepted as noted. Continue to participate in the milieu of activities and work towards discharge goals. Contracts for safety and has no immediate requests    Disposition planning with social work with the goal of a transition to an outpatient level of care. Patient would benefit from ongoing care as they have not yet reached their discharge goals are psychotropic medication optimization.     Tentative discharge TBD     Signed By: Tj Bolaños, PhD, PA-C, PsyCAQ    December 18, 2021

## 2021-12-18 NOTE — BH NOTES
Patient went for a stat ultrasound of the vaginal area, at beginning of the shift, by w/c with one unit staff & one security staff for safety because patient requested to leave AMA earlier today. She was cooperative during the transport & procedure. She did not give staff any problem nor acted out. Once she returned to the unit, she has been in the day room, watching television, laughing and talking with peers. No sign of distress. No complaint of any bleeding, dizziness, weakness or discomfort so far, this shift. Patient did compliant that she felt like she unable to get any sleep around here. Patient is aware that she will  be receiving 0200 medication and that it will her second dosage of antibiotic for her UTI. Patient was okay with that, for now. She on close observation for safety. continue to monitor. Patient is paranoid and liable.

## 2021-12-18 NOTE — BH NOTES
Patient was serve with TDO paper by Martinez Maldonado . Patient is up set, because according to her,  She does not remember anyone explaining this procedure to her or speaking to her about it.

## 2021-12-18 NOTE — GROUP NOTE
IP  GROUP DOCUMENTATION INDIVIDUAL                                                                          Group Therapy Note    Date: 12/18/2021    Group Start Time: 7102  Group End Time: 1100  Group Topic: Nursing    SRM 2  NON ACUTE    Karrie Gusman RN    [facility] GROUP DOCUMENTATION GROUP    Group Therapy Note : Importance of Medication Compliance     Attendees: 6/12         Attendance: Attended    Patient's Goal:      Interventions/techniques: Informed, Validated, Provide feedback and Supported    Follows Directions: Followed directions    Interactions: Interacted appropriately    Mental Status: Calm    Behavior/appearance: Attentive and Cooperative    Goals Achieved: Able to engage in interactions, Able to listen to others, Able to give feedback to another, Able to receive feedback and Identified medication adherence strategies      Additional Notes:  Patient participated in the group activity and in the group discussions regarding the importance of medication compliance. Patient answered questions appropriately and was able to identify reasons why it is important to take medications as prescribed, was able to list possible side effects, and understands if any problems and or concerns to contact the doctor.       Jackie Hines RN

## 2021-12-18 NOTE — BH NOTES
Patient notified of orders for an ultrasound, she was agreeable and was taken via wheelchair with the MHT and  at her side. No acute distress noted.

## 2021-12-18 NOTE — PROGRESS NOTES
Progress Note  Date:2021       Room:[facility]  Patient Vanessa Pantoja     Date of Birth:[birth year]     Age:41 y.o. Subjective    Subjective , wants to go home  Review of Systems  Objective         Vitals Last 24 Hours:  TEMPERATURE:  Temp  Av.3 °F (36.8 °C)  Min: 98.2 °F (36.8 °C)  Max: 98.5 °F (36.9 °C)  RESPIRATIONS RANGE: Resp  Av  Min: 18  Max: 18  PULSE OXIMETRY RANGE: SpO2  Av %  Min: 100 %  Max: 100 %  PULSE RANGE: Pulse  Av.7  Min: 88  Max: 96  BLOOD PRESSURE RANGE: Systolic (77VUT), FCB:821 , Min:105 , XHS:666   ; Diastolic (85ITG), DPX:10, Min:51, Max:73    I/O (24Hr): No intake or output data in the 24 hours ending 21 2316  Objective  Labs/Imaging/Diagnostics    Labs:  CBC:Recent Labs     21  1340   WBC 13.5*   RBC 4.57   HGB 12.8   HCT 40.7   MCV 89.1   RDW 14.9*        CHEMISTRIES:No results for input(s): NA, K, CL, CO2, BUN, CA, PHOS, MG in the last 72 hours. No lab exists for component: CREATININE, GLUCOSEPT/INR:No results for input(s): INR, INREXT in the last 72 hours. No lab exists for component: PROTIME  APTT:No results for input(s): APTT in the last 72 hours. LIVER PROFILE:No results for input(s): AST, ALT in the last 72 hours. No lab exists for component: [place], ALKPHOS  Lab Results   Component Value Date/Time    ALT (SGPT) 26 2021 10:04 AM    AST (SGOT) 16 2021 10:04 AM    Alk. phosphatase 116 2021 10:04 AM    Bilirubin, total 0.6 2021 10:04 AM       Imaging Last 24 Hours:  US RETROPERITONEUM COMP    Result Date: 2021  Exam: Retroperitoneal ultrasound Comparison: CT 2014. Findings: The kidneys are of normal size, shape, and configuration, the right being 11.1 cm long and the left being 10.0 cm. The renal cortices are of normal thickness and echotexture. No focal renal lesion is present. No renal collecting system dilatation is demonstrated.  There is no bladder dilatation. . The visualized inferior vena cava is unremarkable. The visualized abdominal aorta is unremarkable. Unremarkable. Assessment//Plan   Active Problems:    Anxiety (12/12/2021)      Depression (12/12/2021)      Polysubstance abuse ([address] (12/14/2021)      Amenorrhea (12/14/2021)      Positive blood pregnancy test (12/14/2021)      Assessment & Plan patient case discussed in the treatment team patient seen for follow-up she is having a better day in the morning out of his room her room well-dressed well-groomed appropriate.   Better not suicidal but missing the family he says his boyfriend's home she want to go home I informed that she is doing better able to get out of the room good energy and motivation this is the first day she is stable she was stable 2 days ago but next day she she became unstable irritable however she was pushing for discharge the 19 saw the patient in the evening recommended inpatient level of care take a look at on Monday give some time for the medication to work I did increase her Latuda to 40 mg and this is her second she apparently got upset agitated staff asked to talk to her redirect for him, continued inpatient level of care indcated    Electronically signed by Mercedez Milton MD on 12/17/2021 at 11:16 PM

## 2021-12-19 LAB — HCG SERPL QL: POSITIVE

## 2021-12-19 PROCEDURE — 74011250637 HC RX REV CODE- 250/637: Performed by: PSYCHIATRY & NEUROLOGY

## 2021-12-19 PROCEDURE — 84703 CHORIONIC GONADOTROPIN ASSAY: CPT

## 2021-12-19 PROCEDURE — 74011250637 HC RX REV CODE- 250/637: Performed by: OBSTETRICS & GYNECOLOGY

## 2021-12-19 PROCEDURE — 74011250637 HC RX REV CODE- 250/637: Performed by: INTERNAL MEDICINE

## 2021-12-19 PROCEDURE — 36415 COLL VENOUS BLD VENIPUNCTURE: CPT

## 2021-12-19 PROCEDURE — 84702 CHORIONIC GONADOTROPIN TEST: CPT

## 2021-12-19 PROCEDURE — 65220000003 HC RM SEMIPRIVATE PSYCH

## 2021-12-19 RX ADMIN — ACETAMINOPHEN 650 MG: 325 TABLET ORAL at 21:31

## 2021-12-19 RX ADMIN — BUDESONIDE AND FORMOTEROL FUMARATE DIHYDRATE 2 PUFF: 160; 4.5 AEROSOL RESPIRATORY (INHALATION) at 09:09

## 2021-12-19 RX ADMIN — ACETAMINOPHEN 650 MG: 325 TABLET ORAL at 03:01

## 2021-12-19 RX ADMIN — ACETAMINOPHEN 650 MG: 325 TABLET ORAL at 10:36

## 2021-12-19 RX ADMIN — LURASIDONE HYDROCHLORIDE 40 MG: 40 TABLET, FILM COATED ORAL at 17:14

## 2021-12-19 RX ADMIN — AMOXICILLIN 500 MG: 500 CAPSULE ORAL at 17:14

## 2021-12-19 RX ADMIN — AMOXICILLIN 500 MG: 500 CAPSULE ORAL at 03:01

## 2021-12-19 RX ADMIN — PRENATAL VIT W/ FE FUMARATE-FA TAB 27-0.8 MG 1 TABLET: 27-0.8 TAB at 09:09

## 2021-12-19 RX ADMIN — AMOXICILLIN 500 MG: 500 CAPSULE ORAL at 09:09

## 2021-12-19 RX ADMIN — BUDESONIDE AND FORMOTEROL FUMARATE DIHYDRATE 2 PUFF: 160; 4.5 AEROSOL RESPIRATORY (INHALATION) at 20:00

## 2021-12-19 NOTE — PROGRESS NOTES
Problem: Depressed Mood (Adult/Pediatric)  Goal: *STG: Participates in treatment plan  Outcome: Progressing Towards Goal     Problem: Depressed Mood (Adult/Pediatric)  Goal: *STG: Verbalizes anger, guilt, and other feelings in a constructive manor  Outcome: Progressing Towards Goal     Problem: Depressed Mood (Adult/Pediatric)  Goal: *STG: Remains safe in hospital  Outcome: Progressing Towards Goal     Problem: Depressed Mood (Adult/Pediatric)  Goal: *STG: Complies with medication therapy  Outcome: Progressing Towards Goal     Problem: Psychosis  Goal: *STG: Decreased hallucinations  Outcome: Progressing Towards Goal     Problem: Psychosis  Goal: *STG: Decreased delusional thinking  Outcome: Progressing Towards Goal     Problem: Psychosis  Goal: *STG: Remains safe in hospital  Outcome: Progressing Towards Goal

## 2021-12-19 NOTE — GROUP NOTE
IP  GROUP DOCUMENTATION INDIVIDUAL                                                                          Group Therapy Note    Date: 12/19/2021    Group Start Time: 1320  Group End Time: 5603  Group Topic: Recreational/Music Therapy    SRM 2  NON ACUTE    Rosholt Dean    IP [address] [facility] GROUP DOCUMENTATION GROUP    Group Therapy Note    Facilitated leisure skills group as a positive way to cope and manage mood through music and art task    Attendees: 10/12         Attendance: Attended    Patient's Goal:  Attend group daily     Interventions/techniques: Art integration and Supported    Follows Directions: Followed directions    Interactions: Interacted appropriately    Mental Status: Calm    Behavior/appearance: Cooperative    Goals Achieved: Able to engage in interactions and Able to listen to others      Additional Notes: Receptive to listening to music and  a song she selected while working on leisure task.  Interacted with peers and staff     Ann Marie Shin

## 2021-12-19 NOTE — GROUP NOTE
IP  GROUP DOCUMENTATION INDIVIDUAL                                                                          Group Therapy Note    Date: 12/19/2021    Group Start Time: 0933  Group End Time: 6333  Group Topic: Education Group - Inpatient    SRM 2 BH NON ACUTE    Tim Lenox    IP [facility] GROUP DOCUMENTATION GROUP    Group Therapy Note    Healthy relationships/Facilitated discussion focused on breaking down our walls and  being able to recognize attitudes and behaviors that may get in the way of forming or maintaining  quality relationships    Attendees: 11/12         Attendance: Attended    Patient's Goal:  Attend group daily     Interventions/techniques: Informed and Supported    Follows Directions: Followed directions    Interactions: Interacted appropriately    Mental Status: Calm    Behavior/appearance: Cooperative    Goals Achieved: Able to engage in interactions, Able to listen to others and Able to self-disclose      Additional Notes:  Receptive to information discussed and engaged.  Pt shared she recognize \"super-independence from others, avoidance of commitments and extreme loyalty to others\" get in the way of forming or maintaining a quality relationship    Deneen Gonzalez, [address]

## 2021-12-19 NOTE — BH NOTES
Patient has been visible on the unit, socializing with her peers. She in better spirit this evening. She denies depression, SI,HI, AH & VH. She compliant with medication. She remains on close observation for safety.

## 2021-12-20 LAB — HCG SERPL-ACNC: 1352 MIU/ML (ref 0–6)

## 2021-12-20 PROCEDURE — 74011250637 HC RX REV CODE- 250/637: Performed by: INTERNAL MEDICINE

## 2021-12-20 PROCEDURE — 74011250637 HC RX REV CODE- 250/637: Performed by: PSYCHIATRY & NEUROLOGY

## 2021-12-20 PROCEDURE — 65220000003 HC RM SEMIPRIVATE PSYCH

## 2021-12-20 PROCEDURE — 74011250637 HC RX REV CODE- 250/637: Performed by: OBSTETRICS & GYNECOLOGY

## 2021-12-20 RX ADMIN — BUDESONIDE AND FORMOTEROL FUMARATE DIHYDRATE 2 PUFF: 160; 4.5 AEROSOL RESPIRATORY (INHALATION) at 21:41

## 2021-12-20 RX ADMIN — AMOXICILLIN 500 MG: 500 CAPSULE ORAL at 21:41

## 2021-12-20 RX ADMIN — AMOXICILLIN 500 MG: 500 CAPSULE ORAL at 06:13

## 2021-12-20 RX ADMIN — LURASIDONE HYDROCHLORIDE 40 MG: 40 TABLET, FILM COATED ORAL at 17:19

## 2021-12-20 RX ADMIN — ACETAMINOPHEN 650 MG: 325 TABLET ORAL at 06:39

## 2021-12-20 RX ADMIN — AMOXICILLIN 500 MG: 500 CAPSULE ORAL at 14:22

## 2021-12-20 RX ADMIN — BUDESONIDE AND FORMOTEROL FUMARATE DIHYDRATE 2 PUFF: 160; 4.5 AEROSOL RESPIRATORY (INHALATION) at 09:10

## 2021-12-20 RX ADMIN — PRENATAL VIT W/ FE FUMARATE-FA TAB 27-0.8 MG 1 TABLET: 27-0.8 TAB at 09:10

## 2021-12-20 NOTE — GROUP NOTE
[facility] GROUP DOCUMENTATION INDIVIDUAL                                                                          Group Therapy Note    Date: 12/20/2021    Group Start Time: 1115  Group End Time: 1200  Group Topic: Process Group - Inpatient    SRM 2 BEHA [facility] ACUTE    [facility]    IP [facility] GROUP DOCUMENTATION GROUP    Group Therapy Note  The therapist facilitated a group using the stoplight challenge exercise. In this exercise the pt's are asked to identify something that they acknowledge as an issue but haven't started working on yet, something that they are currently working on, and something that they have accomplished or are proud of. Attendees: 9         Attendance: Attended    Patient's Goal:  To attend groups and activities     Interventions/techniques: Validated, Reinforced and Supported    Follows Directions: Followed directions    Interactions: Interacted appropriately    Mental Status: Calm    Behavior/appearance: Attentive and Cooperative    Goals Achieved: Able to engage in interactions, Able to listen to others, Able to reflect/comment on own behavior and Able to self-disclose      Additional Notes: The pt participated well in group. She said that something that she acknowledges is an issue is her anxiety over things, including other people. She said that she has realized that she needs to focus more on herself now. She also said that she is grateful that she made the decision to get help for herself.      Meredith Coulter

## 2021-12-20 NOTE — BH NOTES
Patient coop with assessment, began crying when talking about her mom. Upset her mom is not here  to see the baby that's coming. Stated her boyfriend is happy about the pregnancy and said she has not told her dad yet. Patient had a headache and was given acetaminophen with good effect, pt. Soon fell asleep. Patient seems to be getting along well with the roommate who is similar age, and appeared well groomed with clean brushed hair, and was laughing and smiling.   Labile

## 2021-12-20 NOTE — BH NOTES
TDO DISPOSTION    Pt TDO hearing on 12/20/21 committed up 10 days. Represented by Dillon Starr. Alicia Boucher.
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
## 2021-12-20 NOTE — GROUP NOTE
[facility] GROUP DOCUMENTATION INDIVIDUAL                                                                          Group Therapy Note    Date: 12/20/2021    Group Start Time: 0930  Group End Time: 5548  Group Topic: Process Group - Inpatient    [facility] CARE MANAGEMENT    Aida Porras BSW    [facility] GROUP DOCUMENTATION GROUP    Group Therapy Note    The facilitator educated the group on fight or flight through psych education and promoted feedback and support for one another. Attendees: 11/13         Attendance: Attended    Patient's Goal:  Attend Group     Interventions/techniques: Promoted peer support and Provide feedback    Follows Directions: Followed directions    Interactions: Interacted appropriately    Mental Status: Anxious, Elevated, Labile, Suspicious and Worried    Behavior/appearance: Agitated, Attentive, Caretaking, Cooperative, Enthusiastic and Neatly groomed    Goals Achieved: Able to engage in interactions, Able to listen to others, Able to give feedback to another, Able to reflect/comment on own behavior, Able to receive feedback and Able to self-disclose      Additional Notes: The patient expressed frustration with her hearing today and that she has trouble being able to calm herself down from negative thoughts. But that being outdoors has been the most successful for her when she needs a positive escape.      AURELIANO RuizW

## 2021-12-20 NOTE — PROGRESS NOTES
Spiritual Care Assessment/Progress Note  [facility]      NAME: Chepe Dave      MRN: [id number]  AGE: 37 y.o.  SEX: female  Latter-day Affiliation: No preference   Language: English     12/20/2021     Total Time (in minutes): 7     Spiritual Assessment begun in [facility] 2  NON ACUTE through conversation with:         [x]Patient        [] Family    [] Friend(s)        Reason for Consult: Initial/Spiritual assessment, patient floor     Spiritual beliefs: (Please include comment if needed)     [] Identifies with a kaylie tradition:         [] Supported by a kaylie community:            [] Claims no spiritual orientation:           [] Seeking spiritual identity:                [] Adheres to an individual form of spirituality:           [] Not able to assess:                           Identified resources for coping:      [] Prayer                               [] Music                  [] Guided Imagery     [x] Family/friends                 [] Pet visits     [] Devotional reading                         [] Unknown     [] Other:                                               Interventions offered during this visit: (See comments for more details)    Patient Interventions: Affirmation of emotions/emotional suffering,Affirmation of kaylie,Catharsis/review of pertinent events in supportive environment,Coping skills reviewed/reinforced,Iconic (affirming the presence of God/Higher Power)           Plan of Care:     [] Support spiritual and/or cultural needs    [] Support AMD and/or advance care planning process      [] Support grieving process   [] Coordinate Rites and/or Rituals    [] Coordination with community clergy   [] No spiritual needs identified at this time   [] Detailed Plan of Care below (See Comments)  [] Make referral to Music Therapy  [] Make referral to Pet Therapy     [] Make referral to Addiction services  [] Make referral to [facility]  [] Make referral to Spiritual Care Partner  [] No future visits requested        [x] Contact Spiritual Care for further referrals     Comments:  visit for initial spiritual assessment. Met patient in hallway at the nurses' station. Provided spiritual presence and listening as she spoke of her present thoughts, feelings, and concerns. Said she was feeling better today and feels ready to return home. Hope is to be home in time to spend [place] with her family. Expressed feelings of disappointment at not being able to attend spirituality group, but said she had been meeting with the staff and was unable to attend. Would like to attend the next group if still here. Conversation cut short as she had received a telephone call in the dayroom. She appeared comforted and encouraged as a result of this visit and expressed gratitude for this visit. Informed her of availability f  and pastoral care staff. Rev.  Cali Mg MDiv, [facility], [facility]   paging service: [phone])
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
## 2021-12-20 NOTE — GROUP NOTE
IP  GROUP DOCUMENTATION INDIVIDUAL                                                                          Group Therapy Note    Date: 12/20/2021    Group Start Time: 0900  Group End Time: 0930  Group Topic: [employer]    SR[facility] 2 BH NON ACUTE    [facility] [address] GROUP DOCUMENTATION GROUP    Group Therapy Note    Attendees:        Attendance: Attended    Patient's Goal:  To remain positive and discharge    Interventions/techniques: Informed    Follows Directions:  Followed directions    Interactions: Interacted appropriately    Mental Status: Calm    Behavior/appearance: Attentive and Cooperative    Goals Achieved: Able to engage in interactions, Able to listen to others, Able to give feedback to another and Able to self-disclose      Additional Notes:      Sola Red

## 2021-12-21 PROCEDURE — 74011250637 HC RX REV CODE- 250/637: Performed by: OBSTETRICS & GYNECOLOGY

## 2021-12-21 PROCEDURE — 74011250637 HC RX REV CODE- 250/637: Performed by: PSYCHIATRY & NEUROLOGY

## 2021-12-21 PROCEDURE — 74011250637 HC RX REV CODE- 250/637: Performed by: INTERNAL MEDICINE

## 2021-12-21 PROCEDURE — 65220000003 HC RM SEMIPRIVATE PSYCH

## 2021-12-21 RX ORDER — ONDANSETRON 4 MG/1
4 TABLET, ORALLY DISINTEGRATING ORAL
Status: DISCONTINUED | OUTPATIENT
Start: 2021-12-21 | End: 2021-12-22 | Stop reason: HOSPADM

## 2021-12-21 RX ADMIN — BUDESONIDE AND FORMOTEROL FUMARATE DIHYDRATE 2 PUFF: 160; 4.5 AEROSOL RESPIRATORY (INHALATION) at 08:48

## 2021-12-21 RX ADMIN — AMOXICILLIN 500 MG: 500 CAPSULE ORAL at 21:44

## 2021-12-21 RX ADMIN — AMOXICILLIN 500 MG: 500 CAPSULE ORAL at 06:25

## 2021-12-21 RX ADMIN — AMOXICILLIN 500 MG: 500 CAPSULE ORAL at 14:17

## 2021-12-21 RX ADMIN — BUDESONIDE AND FORMOTEROL FUMARATE DIHYDRATE 2 PUFF: 160; 4.5 AEROSOL RESPIRATORY (INHALATION) at 21:44

## 2021-12-21 RX ADMIN — ACETAMINOPHEN 650 MG: 325 TABLET ORAL at 23:57

## 2021-12-21 RX ADMIN — LURASIDONE HYDROCHLORIDE 60 MG: 40 TABLET, FILM COATED ORAL at 17:03

## 2021-12-21 RX ADMIN — ONDANSETRON 4 MG: 4 TABLET, ORALLY DISINTEGRATING ORAL at 20:42

## 2021-12-21 RX ADMIN — PRENATAL VIT W/ FE FUMARATE-FA TAB 27-0.8 MG 1 TABLET: 27-0.8 TAB at 08:48

## 2021-12-21 NOTE — GROUP NOTE
IP  GROUP DOCUMENTATION INDIVIDUAL                                                                          Group Therapy Note    Date: 12/20/2021    Group Start Time: 9398  Group End Time: 1925  Group Topic: Reflection/Relaxation    SRM 2  NON ACUTE    Kiara Mckeon    [facility] GROUP DOCUMENTATION GROUP    Group Therapy Note    Attendees:9/10  Facilitated structured group to introduce relaxation technique using Mandalas and instrumentals to practice relaxation and mindfulness in group. Attendance: Attended    Patient's Goal: STG: Attends activities and groups      Interventions/techniques: Art integration and Supported    Follows Directions: Followed directions    Interactions: Interacted appropriately    Mental Status: Calm and Flat    Behavior/appearance: Attentive    Goals Achieved: Able to engage in interactions and Able to listen to others      Additional Notes: Attended group and was receptive to intervention. Received material provided in group. Participated in relaxation technique of listening to instrumentals and coloring art task and puzzles. Verbalized enjoyment of group. Attended entire session.      Felix Chen

## 2021-12-21 NOTE — PROGRESS NOTES
Problem: Depressed Mood (Adult/Pediatric)  Goal: *STG: Participates in treatment plan  Outcome: Progressing Towards Goal  Goal: *STG: Verbalizes anger, guilt, and other feelings in a constructive manor  Outcome: Progressing Towards Goal  Goal: *STG: Attends activities and groups  Outcome: Progressing Towards Goal  Goal: *STG: Remains safe in hospital  Outcome: Progressing Towards Goal  Goal: *STG: Complies with medication therapy  Outcome: Progressing Towards Goal     Problem: Psychosis  Goal: *STG: Decreased hallucinations  Outcome: Progressing Towards Goal  Goal: *STG: Decreased delusional thinking  Outcome: Progressing Towards Goal  Goal: *STG: Remains safe in hospital  Outcome: Progressing Towards Goal  Goal: *STG: Seeks staff when feelings of self harm or harm towards others arise  Outcome: Progressing Towards Goal  Goal: *STG: Patient will verbalize areas in need of boundary recognition and limit setting  Outcome: Progressing Towards Goal  Goal: *STG: Accept constructive criticism without injury or isolation  Outcome: Progressing Towards Goal  Goal: *STG/LTG: Complies with medication therapy  Outcome: Progressing Towards Goal

## 2021-12-21 NOTE — PROGRESS NOTES
Psychiatric Progress Note    Patient: Tracy George MRN: [id number]  SSN: [id number]    YOB: 1980  Age: 37 y.o. Sex: female      Admit Date: 12/12/2021       Subjective:     Tracy George continues to report feeling better and optimistic this trend will continue. Denies SI/HI/AH/VH. No aggression or violence. Appropriately interactive and aware. Tolerating medications well. Eating well and sleeping well. Case reviewed with nursing staff and no significant issues or events reported in the last 24 hours. Staff has observed patient interacting on the unit and attending group. Objective:     Reviewed vital signs which are stable    Mental Status Exam:   Sensorium  Oriented to time, place, and situation   Relations Connects with interviewer and engages appropriately   Eye Contact Appropriate   Appearance:  Appropriate for venue and season   Speech:  Normal rate, tone, volume and pattern   Thought Process: Linear, logical and goal-directed   Thought Content Free of delusions, hallucinations and does not appear to be responding to internal stimuli   Suicidal ideations None active and contracts for safety   Mood:    Only mildly depressed and anxious   Affect:   Improving, mood congruent   Memory    Intact   Concentration:  Intact   Insight:   Fair   Judgment:  Fair     No signs of tardive dyskinesia or extrapyramidal symptoms    MEDICATIONS:  Current Facility-Administered Medications   Medication Dose Route Frequency    [START ON 12/21/2021] lurasidone (LATUDA) tablet 60 mg  60 mg Oral DAILY WITH DINNER    amoxicillin (AMOXIL) capsule 500 mg  500 mg Oral Q8H    albuterol (PROVENTIL HFA, VENTOLIN HFA, PROAIR HFA) inhaler 2 Puff  2 Puff Inhalation Q6H PRN    prenatal vit no.130-iron-folic tablet 1 Tablet  1 Tablet Oral DAILY    budesonide-formoteroL (SYMBICORT) 160-4.5 mcg/actuation HFA inhaler 2 Puff  2 Puff Inhalation BID RT    guaiFENesin (ROBITUSSIN) 100 mg/5 mL oral liquid 100 mg  100 mg Oral Q4H PRN    acetaminophen (TYLENOL) tablet 650 mg  650 mg Oral Q4H PRN    magnesium hydroxide (MILK OF MAGNESIA) 400 mg/5 mL oral suspension 30 mL  30 mL Oral DAILY PRN        DISCUSSION:  Discussed risk and benefits of medication, provided an opportunity to answer any questions, reviewed discharge goals. Lab/Data Review:  Recent Results (from the past 24 hour(s))   HCG QL SERUM    Collection Time: 12/19/21  8:18 PM   Result Value Ref Range    HCG, Ql. Positive (A) Negative     BETA HCG, QT    Collection Time: 12/19/21  8:18 PM   Result Value Ref Range    Beta HCG, QT 1,352.0 (H) 0 - 6 mIU/mL           Assessment:     Active Problems:    Anxiety (12/12/2021)      Depression (12/12/2021)      Polysubstance abuse ([address] (12/14/2021)      Amenorrhea (12/14/2021)      Positive blood pregnancy test (12/14/2021)        Plan:     Continue current plan of care excepted as noted. Continue to participate in the milieu of activities and work towards discharge goals. Contracts for safety and has no immediate requests    Disposition planning with social work with the goal of a transition to an outpatient level of care. Patient would benefit from ongoing care as they have not yet reached their discharge goals are psychotropic medication optimization.     Tentative discharge TBD     Signed By: Jerzy Jensen, PhD, PA-C, PsyCAQ    December 20, 2021

## 2021-12-21 NOTE — GROUP NOTE
[facility]  GROUP DOCUMENTATION INDIVIDUAL                                                                          Group Therapy Note    Date: 12/21/2021    Group Start Time: 4992  Group End Time: 8460  Group Topic: Community Meeting    [facility] [address] [facility] GROUP DOCUMENTATION GROUP    Group Therapy Note    Attendees:          Attendance: Attended    Patient's Goal:  Patient     Interventions/techniques:      Follows Directions: Patient follows direction    Interactions:     Mental Status: Happy    Behavior/appearance: Attentive    Goals Achieved: Able to engage in interactions      Additional Notes:  Patient denies depression anxiety, suicidal, and homicidal.    Kelley Pinto

## 2021-12-21 NOTE — BH NOTES
Patient up to the dayroom for meals, ate 100%. Medication compliant. No side effects noted. Attends groups. Stated that she feels ready to go home. Stated that she hopefully will be leaving tomorrow. Verbalized understanding of increase in latuda to begin today. Spends time on the phone. Social with select peers. No physical complaints voiced. Denies SI/HI. Denies AVH. Remains on CO. Continue to monitor & work toward positive discharge goals.
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
## 2021-12-21 NOTE — BH NOTES
Behavioral Health Treatment Team Note     Patient goal(s) for today: \"keep a positive attitude\"  Treatment team focus/goals: Continue group therapy and medication management    Progress note: Pt presents w/ broad affect, and congruent mood. Pt reports feeling \"great' and is hopeful she will discharge today. Pt says she also found out her pregnancy results went from a \"positive negative\" to a \"positive positive\". Pt reports she is keeping a positive outlook on her pregnancy and says her fiances mom is very supportive and excited which helps boost the Pt's mood. Pt also says her fiance is excited and feels like this is a second chance to do it right. Pt denies any anxiety regarding her drug use and pregnancy and says she just needs to stay away from the wrong people and focus on herself at this time. Pt reports she asked the doctor to increase her latuda. Pt again mentioned how she is just a \"sensitive\" person in general but feels better and is ready to go home. IP level of care necessary to continue stabilization with medication. LOS:  9  Expected LOS: 9-11 days    Insurance info/prescription coverage:  VA Medicare part A and B  Date of last family contact:  Not made yet - She identifies her boyfriend as an \"addict\" who is controlling, and needs his own substance use treatment   Family requesting physician contact today:  no  Discharge plan:   To stabilize the pt's mood and symptoms further   Guns in the home:  no   Outpatient provider(s):  Is open to completing PHP and IOP at [facility]    Participating treatment team members: Ced Ashford, * (assigned SW), [facility], [place]

## 2021-12-21 NOTE — GROUP NOTE
[facility]  GROUP DOCUMENTATION INDIVIDUAL                                                                          Group Therapy Note    Date: 12/21/2021    Group Start Time: 2218  Group End Time: 1500  Group Topic: AA/NA    SR[facility] 2 BEHA [facility] ACUTE    Scot Nay    [facility]  GROUP DOCUMENTATION GROUP    Group Therapy Note  The therapist facilitated a group on the different characteristics of the disease of addiction. She also encouraged pt's to share their own experiences that they have had with drugs and alcohol. Attendees: 8         Attendance: Attended    Patient's Goal:  To attend groups and activities    Interventions/techniques: Validated, Reinforced and Supported    Follows Directions: Followed directions    Interactions: Interacted appropriately    Mental Status: Calm    Behavior/appearance: Attentive and Cooperative    Goals Achieved: Able to engage in interactions, Able to listen to others, Able to reflect/comment on own behavior and Able to self-disclose      Additional Notes: The pt spoke about how she has substituted one drug for another all throughout her life. She said that her previous unresolved trauma has triggered her use.      Suzette Morales

## 2021-12-21 NOTE — GROUP NOTE
[facility] GROUP DOCUMENTATION INDIVIDUAL                                                                          Group Therapy Note    Date: 12/21/2021    Group Start Time: 0940  Group End Time: 1025  Group Topic: Education Group - Inpatient    SRM 2  NON ACUTE    Angi Linker    IP [facility] GROUP DOCUMENTATION GROUP    Group Therapy Note    Facilitated  group to introduce the definition and  benefits of diaphragmatic breathing and demonstration of  relaxation technique    Attendees: 9/12         Attendance: Attended    Patient's Goal:  Attend group daily     Interventions/techniques: Informed and Supported    Follows Directions: Followed directions    Interactions: Interacted appropriately    Mental Status: Calm    Behavior/appearance: Cooperative    Goals Achieved: Able to engage in interactions and Able to listen to others      Additional Notes:  Receptive to information discussed and was able to demonstrate ability to practice diaphragmatic breathing technique. Was able to sit quietly and focus on guided imagery. Verbalized feeling relaxed. Shared \"she go on You-tube for different relaxation techniques\"    Griffin Poster, [address]
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
## 2021-12-21 NOTE — GROUP NOTE
[facility] GROUP DOCUMENTATION INDIVIDUAL                                                                          Group Therapy Note    Date: 12/21/2021    Group Start Time: 5687  Group End Time: 3165  Group Topic: Recreational/Music Therapy    SRM 2  NON ACUTE    [place]    IP [address] [facility] GROUP DOCUMENTATION GROUP    Group Therapy Note    Facilitated leisure skills group to reinforce positive coping through music, social interaction, group activities and arts/crafts    Attendees: 8/13         Attendance: Attended    Patient's Goal:  Attend group daily     Interventions/techniques: Art integration and Supported    Follows Directions: Followed directions    Interactions: Interacted appropriately    Mental Status: Calm    Behavior/appearance: Cooperative    Goals Achieved: Able to engage in interactions and Able to listen to others      Additional Notes:  Receptive to listening to music and  a song she selected. Decline to work on leisure task.  Interacted with peers and staff    Maria Del Rosario Lauren

## 2021-12-21 NOTE — GROUP NOTE
DIANA  GROUP DOCUMENTATION INDIVIDUAL                                                                          Group Therapy Note    Date: 12/21/2021    Group Start Time: 1115  Group End Time: 1200  Group Topic: Process Group - Inpatient    [facility] CARE MANAGEMENT    Aida Porras BSW    [facility] GROUP DOCUMENTATION GROUP    Group Therapy Note    The facilitator encouraged the group to process through open discussion as well as promote feedback and support for one another. Attendees: 9/12         Attendance: Attended    Patient's Goal:  Attend Group     Interventions/techniques: Promoted peer support and Provide feedback    Follows Directions: Followed directions    Interactions: Interacted appropriately    Mental Status: Happy    Behavior/appearance: Attentive, Cooperative, Neatly groomed and Needed prompting    Goals Achieved: Able to engage in interactions, Able to listen to others, Able to give feedback to another, Able to receive feedback and Able to self-disclose      Additional Notes: The patient talked about how she is looking forward to getting back to her boyfriend, smoking a cigarette, and cleaning her house when she leaves the hospital. She thanked the writer for her help and group and wished her [address] [place] [address].       COLTON Small

## 2021-12-21 NOTE — BH NOTES
Patient is calm, pleasant and cooperative. Pt was med compliant, did not require any PRN meds. She still c/o depression and anxiety, denies SI, HI and hallucinations. Pt has slept well tonight,no distress noted. Will continue to  Monitor patient closely every 15 Mins as per unit protocol.

## 2021-12-22 VITALS
DIASTOLIC BLOOD PRESSURE: 66 MMHG | SYSTOLIC BLOOD PRESSURE: 112 MMHG | TEMPERATURE: 98.3 F | HEIGHT: 64 IN | WEIGHT: 175 LBS | HEART RATE: 83 BPM | BODY MASS INDEX: 29.88 KG/M2 | OXYGEN SATURATION: 96 % | RESPIRATION RATE: 18 BRPM

## 2021-12-22 PROCEDURE — 74011250637 HC RX REV CODE- 250/637: Performed by: PSYCHIATRY & NEUROLOGY

## 2021-12-22 PROCEDURE — 74011250637 HC RX REV CODE- 250/637: Performed by: INTERNAL MEDICINE

## 2021-12-22 PROCEDURE — 74011250637 HC RX REV CODE- 250/637: Performed by: OBSTETRICS & GYNECOLOGY

## 2021-12-22 RX ORDER — ALBUTEROL SULFATE 90 UG/1
2 AEROSOL, METERED RESPIRATORY (INHALATION)
Qty: 18 G | Refills: 0 | Status: SHIPPED | OUTPATIENT
Start: 2021-12-22

## 2021-12-22 RX ORDER — AMOXICILLIN 500 MG/1
500 CAPSULE ORAL 3 TIMES DAILY
Qty: 20 CAPSULE | Refills: 0 | Status: SHIPPED | OUTPATIENT
Start: 2021-12-22 | End: 2022-07-08

## 2021-12-22 RX ORDER — BUDESONIDE AND FORMOTEROL FUMARATE DIHYDRATE 160; 4.5 UG/1; UG/1
2 AEROSOL RESPIRATORY (INHALATION)
Qty: 10.2 G | Refills: 0 | Status: SHIPPED | OUTPATIENT
Start: 2021-12-22

## 2021-12-22 RX ORDER — ONDANSETRON 4 MG/1
4 TABLET, ORALLY DISINTEGRATING ORAL
Qty: 5 TABLET | Refills: 0 | Status: SHIPPED | OUTPATIENT
Start: 2021-12-22 | End: 2022-07-08

## 2021-12-22 RX ADMIN — ACETAMINOPHEN 650 MG: 325 TABLET ORAL at 04:58

## 2021-12-22 RX ADMIN — ONDANSETRON 4 MG: 4 TABLET, ORALLY DISINTEGRATING ORAL at 09:21

## 2021-12-22 RX ADMIN — AMOXICILLIN 500 MG: 500 CAPSULE ORAL at 06:00

## 2021-12-22 RX ADMIN — BUDESONIDE AND FORMOTEROL FUMARATE DIHYDRATE 2 PUFF: 160; 4.5 AEROSOL RESPIRATORY (INHALATION) at 08:53

## 2021-12-22 RX ADMIN — PRENATAL VIT W/ FE FUMARATE-FA TAB 27-0.8 MG 1 TABLET: 27-0.8 TAB at 08:53

## 2021-12-22 NOTE — BH NOTES
DAYSHIFT/DISCHARGE NOTE:     Patient is up this morning and walking around the unit. Patient has a bright affect and is smiling and states that she is \"happy. \" Patient denies having any depression and or anxiety. Denies SI/HI. Denies AH/VH. Patient is up for her meals and snacks. Patient is medication compliant. Patient was happy this morning because she told the writer that she was suppose to discharge today. Patient presents with much better insight and is actually helpful to other peers on the unit. Patient stated that she used to be a . Patient attends groups and walks around the unit at times. Patient did receive an order for discharge today and patient is happy. Discharge instructions reviewed and understood. Valuables returned to the patient from the safe and the belongings closet. Personal medications returned to the patient that she brought in. Patient is being picked up by her fiance and she stated that they had a family dinner planned and her fiance went to [facility] and picked up supplies for her to make a soup tonight. Patient discharged without any complaints voiced.

## 2021-12-22 NOTE — GROUP NOTE
[facility] GROUP DOCUMENTATION INDIVIDUAL                                                                          Group Therapy Note    Date: 12/22/2021    Group Start Time: 1115  Group End Time: 1716  Group Topic: Process Group - Inpatient    [facility] CARE MANAGEMENT    Aida Porras BSW    [facility] GROUP DOCUMENTATION GROUP    Group Therapy Note    The facilitator encouraged the group to process through open discussion as well as promote feedback and support for one another. Attendees: 10/12         Attendance: Attended    Patient's Goal:  Take a bath, decorate my jaskaran tree, make soup, and see my kids     Interventions/techniques: Promoted peer support and Provide feedback    Follows Directions: Followed directions    Interactions: Interacted appropriately    Mental Status: Happy    Behavior/appearance: Attentive, Caretaking, Cooperative, Motivated and Neatly groomed    Goals Achieved: Able to engage in interactions, Able to listen to others, Able to give feedback to another, Able to reflect/comment on own behavior, Able to manage/cope with feelings, Able to receive feedback, Able to experience relief/decrease in symptoms, Able to self-disclose, Discussed discharge plans and Increased hopefulness      Additional Notes: The patient talked about how happy she was that she'd be discharging today and seeing her family. She was able to recognize what she was like when she came in and compare to how she is doing now. She gave advice to her peers about compassion fatigue and her struggles of saying no to her children. And how she is going to work on saying no more.        AURELIANO ToussaintW

## 2021-12-22 NOTE — PROGRESS NOTES
Problem: Depressed Mood (Adult/Pediatric)  Goal: *STG: Remains safe in hospital  Outcome: Progressing Towards Goal  Goal: *STG: Complies with medication therapy  Outcome: Progressing Towards Goal     Problem: Psychosis  Goal: *STG: Remains safe in hospital  Outcome: Progressing Towards Goal  Goal: *STG: Accept constructive criticism without injury or isolation  Outcome: Progressing Towards Goal  Goal: *STG: Develop safety plan for times when triggered in stressful situations  Outcome: Progressing Towards Goal  Goal: *STG: Patient will verbalize issues that get in their way of progress (i.e.: anger, fear etc.)  Outcome: Progressing Towards Goal  Goal: *STG/LTG: Complies with medication therapy  Outcome: Progressing Towards Goal     Problem: Pregnancy and Addiction  Goal: *STG:Free of withdrawal symptoms  Outcome: Progressing Towards Goal

## 2021-12-22 NOTE — BH NOTES
Behavioral Health Transition Record to Provider    Patient Name: Wenda Baumgarten  YOB: 1980  Medical Record Number: [id number]  Date of Admission: 12/12/2021  Date of Discharge: 12/22/2021    Attending Provider: Sherman Orosco MD  Discharging Provider: [facility]   To contact this individual call [phone] and ask the  to page. If unavailable, ask to be transferred to [address] [place] [address] Provider on call. [facility] Provider will be available on call 24/7 and during holidays. Primary Care Provider: None    Allergies   Allergen Reactions    Nickel Rash       Reason for Admission: Depression and Anxiety     Admission Diagnosis: Depression [F32. A]  Anxiety [F41.9]    * No surgery found *    Results for orders placed or performed during the hospital encounter of 12/12/21   COVID-19 RAPID TEST   Result Value Ref Range    Specimen source Please find results under separate order      COVID-19 rapid test Not Detected Not Detected     CULTURE, URINE    Specimen: Urine   Result Value Ref Range    Special Requests: No Special Requests      [place] Count >100,000  colonies/ml        Culture result: Lactobacillus species (A)     CBC WITH AUTOMATED DIFF   Result Value Ref Range    WBC 17.8 (H) 3.6 - 11.0 K/uL    RBC 4.74 3.80 - 5.20 M/uL    HGB 13.2 11.5 - 16.0 g/dL    HCT 41.5 35.0 - 47.0 %    MCV 87.6 80.0 - 99.0 FL    MCH 27.8 26.0 - 34.0 PG    MCHC 31.8 30.0 - 36.5 g/dL    RDW 14.6 (H) 11.5 - 14.5 %    PLATELET 576 368 - 339 K/uL    MPV 9.8 8.9 - 12.9 FL    NRBC 0.0 0.0  WBC    ABSOLUTE NRBC 0.00 0.00 - 0.01 K/uL    NEUTROPHILS 89 (H) 32 - 75 %    LYMPHOCYTES 5 (L) 12 - 49 %    MONOCYTES 6 5 - 13 %    EOSINOPHILS 0 0 - 7 %    BASOPHILS 0 0 - 1 %    IMMATURE GRANULOCYTES 0 0 - 0.5 %    ABS. NEUTROPHILS 15.8 (H) 1.8 - 8.0 K/UL    ABS. LYMPHOCYTES 0.9 0.8 - 3.5 K/UL    ABS. MONOCYTES 1.0 0.0 - 1.0 K/UL    ABS. EOSINOPHILS 0.0 0.0 - 0.4 K/UL    ABS.  BASOPHILS 0.1 0.0 - 0.1 K/UL    ABS. IMM. GRANS. 0.1 (H) 0.00 - 0.04 K/UL    DF AUTOMATED     METABOLIC PANEL, COMPREHENSIVE   Result Value Ref Range    Sodium 137 136 - 145 mmol/L    Potassium 4.0 3.5 - 5.1 mmol/L    Chloride 105 97 - 108 mmol/L    CO2 25 21 - 32 mmol/L    Anion gap 7 5 - 15 mmol/L    Glucose 123 (H) 65 - 100 mg/dL    BUN 8 6 - 20 mg/dL    Creatinine 0.74 0.55 - 1.02 mg/dL    BUN/Creatinine ratio 11 (L) 12 - 20      GFR est AA >60 >60 ml/min/1.73m2    GFR est non-AA >60 >60 ml/min/1.73m2    Calcium 9.5 8.5 - 10.1 mg/dL    Bilirubin, total 0.6 0.2 - 1.0 mg/dL    AST (SGOT) 16 15 - 37 U/L    ALT (SGPT) 26 12 - 78 U/L    Alk. phosphatase 116 45 - 117 U/L    Protein, total 7.3 6.4 - 8.2 g/dL    Albumin 3.7 3.5 - 5.0 g/dL    Globulin 3.6 2.0 - 4.0 g/dL    A-G Ratio 1.0 (L) 1.1 - 2.2     ETHYL ALCOHOL   Result Value Ref Range    ALCOHOL(ETHYL),SERUM <4 <10 mg/dL   HCG QL SERUM   Result Value Ref Range    HCG, Ql. Positive (A) Negative     DRUG SCREEN, URINE   Result Value Ref Range    AMPHETAMINES Positive (A) Negative      BARBITURATES Negative Negative      BENZODIAZEPINES Negative Negative      COCAINE Positive (A) Negative      METHADONE Negative Negative      OPIATES Negative Negative      PCP(PHENCYCLIDINE) Negative Negative      THC (TH-CANNABINOL) Positive (A) Negative      Drug screen comment        This test is a screen for drugs of abuse in a medical setting only (i.e., they are unconfirmed results and as such must not be used for non-medical purposes, e.g.,employment testing, legal testing). Due to its inherent nature, false positive (FP) and false negative (FN) results may be obtained. Therefore, if necessary for medical care, recommend confirmation of positive findings by GC/MS.    URINALYSIS W/ REFLEX CULTURE    Specimen: Urine   Result Value Ref Range    Color Yellow/Straw      Appearance Turbid (A) Clear      Specific gravity 1.012 1.003 - 1.030      pH (UA) 7.0 5.0 - 8.0      Protein Negative Negative mg/dL    Glucose Negative Negative mg/dL    Ketone 5 (A) Negative mg/dL    Bilirubin Negative Negative      Blood Small (A) Negative      Urobilinogen 0.1 0.1 - 1.0 EU/dL    Nitrites Negative Negative      Leukocyte Esterase Small (A) Negative      UA:UC IF INDICATED Culture not indicated by UA result Culture not indicated by UA result      WBC 0-4 0 - 4 /hpf    RBC 0-5 0 - 5 /hpf    Bacteria Negative Negative /hpf    Mucus Trace /lpf   BETA HCG, QT   Result Value Ref Range    Beta HCG, .0 (H) 0 - 6 mIU/mL   BETA HCG, QT   Result Value Ref Range    Beta HCG, .0 (H) 0 - 6 mIU/mL   CBC WITH AUTOMATED DIFF   Result Value Ref Range    WBC 13.5 (H) 3.6 - 11.0 K/uL    RBC 4.57 3.80 - 5.20 M/uL    HGB 12.8 11.5 - 16.0 g/dL    HCT 40.7 35.0 - 47.0 %    MCV 89.1 80.0 - 99.0 FL    MCH 28.0 26.0 - 34.0 PG    MCHC 31.4 30.0 - 36.5 g/dL    RDW 14.9 (H) 11.5 - 14.5 %    PLATELET 002 124 - 633 K/uL    MPV 9.9 8.9 - 12.9 FL    NRBC 0.0 0.0  WBC    ABSOLUTE NRBC 0.00 0.00 - 0.01 K/uL    NEUTROPHILS 79 (H) 32 - 75 %    LYMPHOCYTES 10 (L) 12 - 49 %    MONOCYTES 8 5 - 13 %    EOSINOPHILS 1 0 - 7 %    BASOPHILS 1 0 - 1 %    IMMATURE GRANULOCYTES 1 (H) 0 - 0.5 %    ABS. NEUTROPHILS 10.8 (H) 1.8 - 8.0 K/UL    ABS. LYMPHOCYTES 1.4 0.8 - 3.5 K/UL    ABS. MONOCYTES 1.0 0.0 - 1.0 K/UL    ABS. EOSINOPHILS 0.1 0.0 - 0.4 K/UL    ABS. BASOPHILS 0.1 0.0 - 0.1 K/UL    ABS. IMM.  GRANS. 0.1 (H) 0.00 - 0.04 K/UL    DF AUTOMATED     CBC WITH AUTOMATED DIFF   Result Value Ref Range    WBC 15.5 (H) 3.6 - 11.0 K/uL    RBC 3.97 3.80 - 5.20 M/uL    HGB 11.3 (L) 11.5 - 16.0 g/dL    HCT 35.8 35.0 - 47.0 %    MCV 90.2 80.0 - 99.0 FL    MCH 28.5 26.0 - 34.0 PG    MCHC 31.6 30.0 - 36.5 g/dL    RDW 15.1 (H) 11.5 - 14.5 %    PLATELET 879 160 - 154 K/uL    MPV 9.6 8.9 - 12.9 FL    NRBC 0.0 0.0  WBC    ABSOLUTE NRBC 0.00 0.00 - 0.01 K/uL    NEUTROPHILS 78 (H) 32 - 75 %    LYMPHOCYTES 12 12 - 49 %    MONOCYTES 7 5 - 13 %    EOSINOPHILS 1 0 - 7 %    BASOPHILS 1 0 - 1 %    IMMATURE GRANULOCYTES 1 (H) 0 - 0.5 %    ABS. NEUTROPHILS 12.3 (H) 1.8 - 8.0 K/UL    ABS. LYMPHOCYTES 1.8 0.8 - 3.5 K/UL    ABS. MONOCYTES 1.1 (H) 0.0 - 1.0 K/UL    ABS. EOSINOPHILS 0.1 0.0 - 0.4 K/UL    ABS. BASOPHILS 0.1 0.0 - 0.1 K/UL    ABS. IMM. GRANS. 0.1 (H) 0.00 - 0.04 K/UL    DF AUTOMATED     HCG QL SERUM   Result Value Ref Range    HCG, Ql. Positive (A) Negative     BETA HCG, QT   Result Value Ref Range    Beta HCG, QT 1,352.0 (H) 0 - 6 mIU/mL       Immunizations administered during this encounter: There is no immunization history on file for this patient. Screening for Metabolic Disorders for Patients on Antipsychotic Medications  (Data obtained from the EMR)    Estimated Body Mass Index  Estimated body mass index is 30.04 kg/m² as calculated from the following:    Height as of this encounter: 5' 4\" (1.626 m). Weight as of this encounter: 79.4 kg (175 lb). Vital Signs/Blood Pressure  Visit Vitals  /66 (BP 1 Location: Right arm, BP Patient Position: Sitting)   Pulse 83   Temp 98.3 °F (36.8 °C)   Resp 18   Ht 5' 4\" (1.626 m)   Wt 79.4 kg (175 lb)   SpO2 96%   Breastfeeding No   BMI 30.04 kg/m²       Blood Glucose/Hemoglobin A1c  Lab Results   Component Value Date/Time    Glucose 123 (H) 12/12/2021 10:04 AM       No results found for: HBA1C, PGH5KEBP     Lipid Panel  No results found for: CHOL, CHOLX, CHLST, CHOLV, 791041, HDL, HDLP, LDL, LDLC, DLDLP, TGLX, TRIGL, TRIGP, CHHD, CHHDX     Discharge Diagnosis:  Depression [F32. A]  Anxiety [F41.9]      Discharge Plan: The patient will discharged home with IOP set up with [facility]    Discharge Medication List and Instructions: There are no discharge medications for this patient.       Unresulted Labs (24h ago, onward)            None        To obtain results of studies pending at discharge, please contact [phone]    Follow-up Information     Follow up With Specialties Details Why [address] Niurka Mittal - PHP  On 12/28/2021 The program begins at 8:30am. They will assess you for their substance use groups as well. [phone]  [address], [place], [facility] [address]          Advanced Directive:   Does the patient have an appointed surrogate decision maker? No  Does the patient have a Medical Advance Directive? No  Does the patient have a Psychiatric Advance Directive? No  If the patient does not have a surrogate or Medical Advance Directive AND Psychiatric Advance Directive, the patient was offered information on these advance directives Patient will complete at a later time    Patient Instructions: Please continue all medications until otherwise directed by physician. Tobacco Cessation Discharge Plan:   Is the patient a smoker and needs referral for smoking cessation? No  Patient referred to the following for smoking cessation with an appointment? No     Patient was offered medication to assist with smoking cessation at discharge? No  Was education for smoking cessation added to the discharge instructions? No    Alcohol/Substance Abuse Discharge Plan:   Does the patient have a history of substance/alcohol abuse and requires a referral for treatment? No  Patient referred to the following for substance/alcohol abuse treatment with an appointment? Yes  Patient was offered medication to assist with alcohol cessation at discharge? Yes  Was education for substance/alcohol abuse added to discharge instructions?  Yes    Patient discharged to Home; discussed with patient/caregiver

## 2021-12-22 NOTE — PROGRESS NOTES
Progress Note  Date:2021       Room:[facility]  Patient Aisha Graf     YOB: 1980     Age:41 y.o. Subjective    Subjective Latuda helping looking forward for discharge tomorrow  Review of Systems  Objective         Vitals Last 24 Hours:  TEMPERATURE:  Temp  Av.2 °F (36.8 °C)  Min: 98.2 °F (36.8 °C)  Max: 98.2 °F (36.8 °C)  RESPIRATIONS RANGE: Resp  Av  Min: 18  Max: 18  PULSE OXIMETRY RANGE: SpO2  Av %  Min: 96 %  Max: 98 %  PULSE RANGE: Pulse  Av.5  Min: 81  Max: 98  BLOOD PRESSURE RANGE: Systolic (87KGN), KUE:086 , Min:101 , CDZ:528   ; Diastolic (65KXT), WPR:12, Min:64, Max:71    I/O (24Hr): No intake or output data in the 24 hours ending 21 4860  Objective  Labs/Imaging/Diagnostics    Labs:  CBC:No results for input(s): WBC, RBC, HGB, HCT, MCV, RDW, PLT, HGBEXT, HCTEXT, PLTEXT in the last 72 hours. CHEMISTRIES:No results for input(s): NA, K, CL, CO2, BUN, CA, PHOS, MG in the last 72 hours. No lab exists for component: CREATININE, GLUCOSEPT/INR:No results for input(s): INR, INREXT in the last 72 hours. No lab exists for component: PROTIME  APTT:No results for input(s): APTT in the last 72 hours. LIVER PROFILE:No results for input(s): AST, ALT in the last 72 hours. No lab exists for component: Gloria July, ALKPHOS  Lab Results   Component Value Date/Time    ALT (SGPT) 26 2021 10:04 AM    AST (SGOT) 16 2021 10:04 AM    Alk. phosphatase 116 2021 10:04 AM    Bilirubin, total 0.6 2021 10:04 AM       Imaging Last 24 Hours:  No results found.   Assessment//Plan   Active Problems:    Anxiety (2021)      Depression (2021)      Polysubstance abuse ([facility] [address] (2021)      Amenorrhea (2021)      Positive blood pregnancy test (2021)      Assessment & Plan patient case discussed in the treatment team and agreed to discharge her events patient take medication taking medications tried she felt the medication was working she handling the lack of discharge today.   Attending groups learning coping skills stress management no side effect from increase did not Latuda continue present treatment not suicidal not homicidal not paranoid    Electronically signed by Winsome Fraser MD on 12/21/2021 at 11:39 PM

## 2021-12-22 NOTE — BH NOTES
DISCHARGE SUMMARY    Reid Rico  : 1980  MRN: [id number]    The patient Meka Mera exhibits the ability to control behavior in a less restrictive environment. Patient's level of functioning is improving. No assaultive/destructive behavior has been observed for the past 24 hours. No suicidal/homicidal threat or behavior has been observed for the past 24 hours. There is no evidence of serious medication side effects. Patient has not been in physical or protective restraints for at least the past 24 hours. If weapons involved, how are they secured? N/A    Is patient aware of and in agreement with discharge plan? YES    Arrangements for medication:  Prescriptions given to patient, given a weeks supply or 30 day supply. Copy of discharge instructions to provider?:  YES    Arrangements for transportation home: The patients inderjit will be picking her up to take her home. Keep all follow up appointments as scheduled, continue to take prescribed medications per physician instructions.   Mental health crisis number:  509 or your local mental health crisis line number at [place] [address] Emergency WARM LINE      [phone] ([phone])      M-F: 9am to 9pm      Sat & Sun: 5pm - 9pm  National suicide prevention lines:                             3-542-QPHKURK (0-740-961-326-516-3130)       0-941-186-TALK (7-845.643.5663)    Crisis Text Line:  Text HOME to 798621

## 2021-12-22 NOTE — BH NOTES
0920am: Patient was given a PO PRN Zofran for complaints of nausea and acid reflux. 1020am: Medication effective.

## 2021-12-22 NOTE — BH NOTES
Patient opened up well to nurse about family and that she though she was using  Cocaine only when someone gave  It to her at a party but that she doesn't do deth, and believes the cocaine  Had to be laced with meth. Pt has not slept well tonight. She feels a little sleepy after dinner but seems since taking the [place] she has had trouble sleeping at night. Tonight she has been awake in the bed or walking in halls or talking with staff. Pt would like to change the time  She takes the [place]. Will inform dayshift nurse to ask Keyshawn Solorzano if this could be changed. Pt denies depression, anxiety, SI,  HI, hallucinations. Pt currently awake,  Voices concerns over pregnancy and her boyfriend who is the father of the baby. Pt shows no distress noted, respirations regular and unlabored. Will continue to monitor patient closely every 15 mins as per unit protocol.

## 2021-12-22 NOTE — GROUP NOTE
IP  GROUP DOCUMENTATION INDIVIDUAL                                                                          Group Therapy Note    Date: 12/22/2021    Group Start Time: 0930  Group End Time: 1020  Group Topic: Education Group - Inpatient    [facility] 2  NON ACUTE    Claude Newcomer    [facility] GROUP DOCUMENTATION GROUP    Group Therapy Note    Facilitated discussion focused on developing a plan for safety to identify warning signs, coping skills , people and community resources to help manage stress and maintain safety    Attendees: 8/12         Attendance: Attended    Patient's Goal:  Attend group daily     Interventions/techniques: Informed and Supported    Follows Directions:  Followed directions    Interactions: Interacted appropriately    Mental Status: Calm    Behavior/appearance: Cooperative    Goals Achieved: Able to engage in interactions, Able to listen to others, Able to self-disclose and Discussed safety plan      Additional Notes:  Receptive to information discussed and was able to complete a safety plan    Favian Blackmon, [address]

## 2022-01-05 ENCOUNTER — HOSPITAL ENCOUNTER (EMERGENCY)
Age: 42
Discharge: HOME | End: 2022-01-05
Attending: EMERGENCY MEDICINE
Payer: MEDICARE

## 2022-01-05 ENCOUNTER — APPOINTMENT (OUTPATIENT)
Dept: ULTRASOUND IMAGING | Age: 42
End: 2022-01-05
Attending: EMERGENCY MEDICINE
Payer: MEDICARE

## 2022-01-05 VITALS
BODY MASS INDEX: 30.73 KG/M2 | SYSTOLIC BLOOD PRESSURE: 121 MMHG | RESPIRATION RATE: 20 BRPM | DIASTOLIC BLOOD PRESSURE: 78 MMHG | WEIGHT: 180 LBS | TEMPERATURE: 97.6 F | HEIGHT: 64 IN | HEART RATE: 97 BPM | OXYGEN SATURATION: 98 %

## 2022-01-05 DIAGNOSIS — O36.80X0 PREGNANCY, LOCATION UNKNOWN: Primary | ICD-10-CM

## 2022-01-05 DIAGNOSIS — O20.0 THREATENED ABORTION: ICD-10-CM

## 2022-01-05 LAB
ABO + RH BLD: NORMAL
ALBUMIN SERPL-MCNC: 3.5 G/DL (ref 3.5–5)
ALBUMIN/GLOB SERPL: 0.9 {RATIO} (ref 1.1–2.2)
ALP SERPL-CCNC: 113 U/L (ref 45–117)
ALT SERPL-CCNC: 18 U/L (ref 12–78)
ANION GAP SERPL CALC-SCNC: 7 MMOL/L (ref 5–15)
APPEARANCE UR: CLEAR
AST SERPL W P-5'-P-CCNC: 28 U/L (ref 15–37)
BACTERIA URNS QL MICRO: NEGATIVE /HPF
BASOPHILS # BLD: 0.1 K/UL (ref 0–0.1)
BASOPHILS NFR BLD: 1 % (ref 0–1)
BILIRUB SERPL-MCNC: 0.3 MG/DL (ref 0.2–1)
BILIRUB UR QL: NEGATIVE
BUN SERPL-MCNC: 11 MG/DL (ref 6–20)
BUN/CREAT SERPL: 15 (ref 12–20)
CA-I BLD-MCNC: 9.3 MG/DL (ref 8.5–10.1)
CHLORIDE SERPL-SCNC: 102 MMOL/L (ref 97–108)
CO2 SERPL-SCNC: 24 MMOL/L (ref 21–32)
COLOR UR: ABNORMAL
CREAT SERPL-MCNC: 0.71 MG/DL (ref 0.55–1.02)
DIFFERENTIAL METHOD BLD: ABNORMAL
EOSINOPHIL # BLD: 0.1 K/UL (ref 0–0.4)
EOSINOPHIL NFR BLD: 1 % (ref 0–7)
ERYTHROCYTE [DISTWIDTH] IN BLOOD BY AUTOMATED COUNT: 15.7 % (ref 11.5–14.5)
GLOBULIN SER CALC-MCNC: 3.8 G/DL (ref 2–4)
GLUCOSE SERPL-MCNC: 116 MG/DL (ref 65–100)
GLUCOSE UR STRIP.AUTO-MCNC: NEGATIVE MG/DL
HCG SERPL-ACNC: 1461 MIU/ML (ref 0–6)
HCT VFR BLD AUTO: 41.7 % (ref 35–47)
HGB BLD-MCNC: 13 G/DL (ref 11.5–16)
HGB UR QL STRIP: ABNORMAL
IMM GRANULOCYTES # BLD AUTO: 0 K/UL (ref 0–0.04)
IMM GRANULOCYTES NFR BLD AUTO: 0 % (ref 0–0.5)
KETONES UR QL STRIP.AUTO: NEGATIVE MG/DL
LEUKOCYTE ESTERASE UR QL STRIP.AUTO: NEGATIVE
LYMPHOCYTES # BLD: 1.4 K/UL (ref 0.8–3.5)
LYMPHOCYTES NFR BLD: 11 % (ref 12–49)
MCH RBC QN AUTO: 28.1 PG (ref 26–34)
MCHC RBC AUTO-ENTMCNC: 31.2 G/DL (ref 30–36.5)
MCV RBC AUTO: 90.1 FL (ref 80–99)
MONOCYTES # BLD: 0.8 K/UL (ref 0–1)
MONOCYTES NFR BLD: 6 % (ref 5–13)
NEUTS SEG # BLD: 9.9 K/UL (ref 1.8–8)
NEUTS SEG NFR BLD: 81 % (ref 32–75)
NITRITE UR QL STRIP.AUTO: NEGATIVE
NRBC # BLD: 0 K/UL (ref 0–0.01)
NRBC BLD-RTO: 0 PER 100 WBC
PH UR STRIP: 6 [PH] (ref 5–8)
PLATELET # BLD AUTO: 352 K/UL (ref 150–400)
PMV BLD AUTO: 9.5 FL (ref 8.9–12.9)
POTASSIUM SERPL-SCNC: 4.8 MMOL/L (ref 3.5–5.1)
PROT SERPL-MCNC: 7.3 G/DL (ref 6.4–8.2)
PROT UR STRIP-MCNC: NEGATIVE MG/DL
RBC # BLD AUTO: 4.63 M/UL (ref 3.8–5.2)
RBC #/AREA URNS HPF: ABNORMAL /HPF (ref 0–5)
SODIUM SERPL-SCNC: 133 MMOL/L (ref 136–145)
SP GR UR REFRACTOMETRY: 1 (ref 1–1.03)
UA: UC IF INDICATED,UAUC: ABNORMAL
UROBILINOGEN UR QL STRIP.AUTO: 0.1 EU/DL (ref 0.1–1)
WBC # BLD AUTO: 12.3 K/UL (ref 3.6–11)
WBC URNS QL MICRO: ABNORMAL /HPF (ref 0–4)

## 2022-01-05 PROCEDURE — 36415 COLL VENOUS BLD VENIPUNCTURE: CPT

## 2022-01-05 PROCEDURE — 84702 CHORIONIC GONADOTROPIN TEST: CPT

## 2022-01-05 PROCEDURE — 76817 TRANSVAGINAL US OBSTETRIC: CPT

## 2022-01-05 PROCEDURE — 80053 COMPREHEN METABOLIC PANEL: CPT

## 2022-01-05 PROCEDURE — 85025 COMPLETE CBC W/AUTO DIFF WBC: CPT

## 2022-01-05 PROCEDURE — 81001 URINALYSIS AUTO W/SCOPE: CPT

## 2022-01-05 PROCEDURE — 86900 BLOOD TYPING SEROLOGIC ABO: CPT

## 2022-01-05 PROCEDURE — 99283 EMERGENCY DEPT VISIT LOW MDM: CPT

## 2022-01-05 NOTE — ED PROVIDER NOTES
EMERGENCY DEPARTMENT HISTORY AND PHYSICAL EXAM      Date: 1/5/2022  Patient Name: Marcela Kelsey    History of Presenting Illness     Chief Complaint   Patient presents with    Vaginal Bleeding       History Provided By: Patient    HPI: Marcela Kelsey, 37 y.o. female with PMHx as noted below presents the emergency department chief complaint of abdominal pain and vaginal bleeding in the setting of being pregnant. Patient states that her last normal menstrual period was sometime in mid October, had positive home pregnancy test last week. Patient notes onset of intermittent suprapubic cramping 3 days ago. She notes that the cramping became more constant and severe today. She also reports vaginal bleeding today going through approximately 1-2 pads per day since yesterday. Patient has not had OB care yet but does have her first appointment scheduled for later this month. Pt denies any other alleviating or exacerbating factors. Additionally, pt specifically denies any recent fever, chills, headache, nausea, vomiting, \ CP, SOB, lightheadedness, dizziness, numbness, weakness, BLE swelling, heart palpitations, urinary sxs, diarrhea, constipation, melena, hematochezia, cough, or congestion. PCP: None    Current Outpatient Medications   Medication Sig Dispense Refill    budesonide-formoteroL (SYMBICORT) 160-4.5 mcg/actuation HFAA Take 2 Puffs by inhalation two (2) times daily as needed for PRN Reason (Other) (sob). 10.2 g 0    albuterol (PROVENTIL HFA, VENTOLIN HFA, PROAIR HFA) 90 mcg/actuation inhaler Take 2 Puffs by inhalation every six (6) hours as needed for Wheezing or Shortness of Breath. 18 g 0    prenatal vit no.130-iron-folic 27 mg iron- 434 mcg tab tablet Take 1 Tablet by mouth daily. 30 Tablet 0    amoxicillin (AMOXIL) 500 mg capsule Take 1 Capsule by mouth three (3) times daily. 20 Capsule 0    lurasidone (LATUDA) 60 mg tab tablet Take 1 Tablet by mouth daily (with dinner).  30 Tablet 0    ondansetron (ZOFRAN ODT) 4 mg disintegrating tablet Take 1 Tablet by mouth every eight (8) hours as needed for Nausea or Vomiting. 5 Tablet 0       Past History     Past Medical History:  Past Medical History:   Diagnosis Date    Anxiety     Depression     Drug abuse ([facility] [address]     PTSD (post-traumatic stress disorder)        Past Surgical History:  History reviewed. No pertinent surgical history. Family History:  History reviewed. No pertinent family history. Social History:  Social History     Tobacco Use    Smoking status: Heavy Tobacco Smoker     Packs/day: 1.00    Smokeless tobacco: Never Used   Vaping Use    Vaping Use: Never used   Substance Use Topics    Alcohol use: Yes     Comment: rarely    Drug use: Not Currently       Allergies: Allergies   Allergen Reactions    Nickel Rash         Review of Systems   Review of Systems  Constitutional: Negative for fever, chills, and fatigue. HENT: Negative for congestion, sore throat, rhinorrhea, sneezing and neck stiffness   Eyes: Negative for discharge and redness. Respiratory: Negative for  shortness of breath, wheezing   Cardiovascular: Negative for chest pain, palpitations   Gastrointestinal: Positive abdominal pain. Negative for nausea, vomiting,constipation, diarrhea and blood in stool. Genitourinary: Negative for dysuria, hematuria, flank pain, decreased urine volume, discharge,   Musculoskeletal: Negative for myalgias or joint pain . Skin: Negative for rash or lesions . Neurological: Negative weakness, light-headedness, numbness and headaches. Physical Exam   Physical Exam    GENERAL: alert and oriented, no acute distress  EYES: PEERL, No injection, discharge or icterus. ENT: Mucous membranes pink and moist.  NECK: Supple  LUNGS: Airway patent. Non-labored respirations. Breath sounds clear with good air entry bilaterally. HEART: Regular rate and rhythm.  No peripheral edema  ABDOMEN: Non-distended and non-tender, without guarding or rebound. SKIN:  warm, dry  MSK/EXTREMITIES: Without swelling, tenderness or deformity, symmetric with normal ROM  NEUROLOGICAL: Alert, oriented      Diagnostic Study Results     Labs -     Recent Results (from the past 12 hour(s))   CBC WITH AUTOMATED DIFF    Collection Time: 01/05/22 12:16 PM   Result Value Ref Range    WBC 12.3 (H) 3.6 - 11.0 K/uL    RBC 4.63 3.80 - 5.20 M/uL    HGB 13.0 11.5 - 16.0 g/dL    HCT 41.7 35.0 - 47.0 %    MCV 90.1 80.0 - 99.0 FL    MCH 28.1 26.0 - 34.0 PG    MCHC 31.2 30.0 - 36.5 g/dL    RDW 15.7 (H) 11.5 - 14.5 %    PLATELET 068 723 - 890 K/uL    MPV 9.5 8.9 - 12.9 FL    NRBC 0.0 0.0  WBC    ABSOLUTE NRBC 0.00 0.00 - 0.01 K/uL    NEUTROPHILS 81 (H) 32 - 75 %    LYMPHOCYTES 11 (L) 12 - 49 %    MONOCYTES 6 5 - 13 %    EOSINOPHILS 1 0 - 7 %    BASOPHILS 1 0 - 1 %    IMMATURE GRANULOCYTES 0 0 - 0.5 %    ABS. NEUTROPHILS 9.9 (H) 1.8 - 8.0 K/UL    ABS. LYMPHOCYTES 1.4 0.8 - 3.5 K/UL    ABS. MONOCYTES 0.8 0.0 - 1.0 K/UL    ABS. EOSINOPHILS 0.1 0.0 - 0.4 K/UL    ABS. BASOPHILS 0.1 0.0 - 0.1 K/UL    ABS. IMM. GRANS. 0.0 0.00 - 0.04 K/UL    DF AUTOMATED     METABOLIC PANEL, COMPREHENSIVE    Collection Time: 01/05/22 12:16 PM   Result Value Ref Range    Sodium 133 (L) 136 - 145 mmol/L    Potassium 4.8 3.5 - 5.1 mmol/L    Chloride 102 97 - 108 mmol/L    CO2 24 21 - 32 mmol/L    Anion gap 7 5 - 15 mmol/L    Glucose 116 (H) 65 - 100 mg/dL    BUN 11 6 - 20 mg/dL    Creatinine 0.71 0.55 - 1.02 mg/dL    BUN/Creatinine ratio 15 12 - 20      GFR est AA >60 >60 ml/min/1.73m2    GFR est non-AA >60 >60 ml/min/1.73m2    Calcium 9.3 8.5 - 10.1 mg/dL    Bilirubin, total 0.3 0.2 - 1.0 mg/dL    AST (SGOT) 28 15 - 37 U/L    ALT (SGPT) 18 12 - 78 U/L    Alk.  phosphatase 113 45 - 117 U/L    Protein, total 7.3 6.4 - 8.2 g/dL    Albumin 3.5 3.5 - 5.0 g/dL    Globulin 3.8 2.0 - 4.0 g/dL    A-G Ratio 0.9 (L) 1.1 - 2.2     BETA HCG, QT    Collection Time: 01/05/22 12:16 PM   Result Value Ref Range    Beta HCG, QT 1,461.0 (H) 0 - 6 mIU/mL   URINALYSIS W/ REFLEX CULTURE    Collection Time: 22 12:55 PM    Specimen: Urine   Result Value Ref Range    Color Yellow/Straw      Appearance Clear Clear      Specific gravity 1.005 1.003 - 1.030      pH (UA) 6.0 5.0 - 8.0      Protein Negative Negative mg/dL    Glucose Negative Negative mg/dL    Ketone Negative Negative mg/dL    Bilirubin Negative Negative      Blood Large (A) Negative      Urobilinogen 0.1 0.1 - 1.0 EU/dL    Nitrites Negative Negative      Leukocyte Esterase Negative Negative      UA:UC IF INDICATED Culture not indicated by UA result Culture not indicated by UA result      WBC 0-4 0 - 4 /hpf    RBC 5-10 0 - 5 /hpf    Bacteria Negative Negative /hpf   BLOOD TYPE, (ABO+RH)    Collection Time: 22  1:40 PM   Result Value Ref Range    ABO/Rh(D) O Positive        Radiologic Studies -   US UTS TRANSVAGINAL OB   Final Result        CT Results  (Last 48 hours)    None        CXR Results  (Last 48 hours)    None            Medical Decision Making     ISridevi MD am the first provider for this patient and am the attending of record for this patient encounter. I reviewed the vital signs, available nursing notes, past medical history, past surgical history, family history and social history. Vital Signs-Reviewed the patient's vital signs. Patient Vitals for the past 12 hrs:   Temp Pulse Resp BP SpO2   22 0901 97.6 °F (36.4 °C) 97 20 121/78 98 %         Records Reviewed: Nursing Notes and Old Medical Records    Provider Notes (Medical Decision Making): The patient presents to the ER with a chief complaint of vaginal bleeding in the setting of an early pregnancy without a previously documented IUP. On presentation, the patient is well appearing, in no acute distress with normal vital signs.   Differential diagnosis considered includes ectopic pregnancy, threatened or incomplete , placenta previa, subchorionic hemorrhage, molar pregnancy, chorionic cyst, vaginitis, cervicitis, pregnancy of unknown location, or idiopathic bleeding during pregnancy. All labs and diagnostic studies were reviewed as reported above. Clinical history, examination and work-up suggests abnormal bleeding in pregnancy and threatened AB. Pregnancy location is not confirmed at this time.      Examination today does not demonstrate significant life threatening hemorrhage. Vital signs were unremarkable. Rh status was reviewed and positive. Rhogam was NOT indicated. There was no ectopic pregnancy or surgical concerns based on exam however cannot fully rule this out. .  There was no evidence of significant infection to be treated. The patient was in stable condition and planned for outpatient management. Patient was given bleeding precautions including instructions to return for soaked pads/tampons greater than every 1 hour, sudden increase in bleeding, severe pain, lightheadedness, fainting, or any other concerning symptoms. Strict pelvic rest was recommended until otherwise instructed by her obstetrician. Patient is plan to follow with her OB/GYN tomorrow for reassessment. ED Course:   Initial assessment performed. The patients presenting problems have been discussed, and they are in agreement with the care plan formulated and outlined with them. I have encouraged them to ask questions as they arise throughout their visit. Breannai Meckel Rush's  results have been reviewed with her. She has been counseled regarding her diagnosis. She verbally conveys understanding and agreement of the signs, symptoms, diagnosis, treatment and prognosis and additionally agrees to follow up as recommended with Dr. None in 24 - 48 hours. She also agrees with the care-plan and conveys that all of her questions have been answered.   I have also put together some discharge instructions for her that include: 1) educational information regarding their diagnosis, 2) how to care for their diagnosis at home, as well a 3) list of reasons why they would want to return to the ED prior to their follow-up appointment, should their condition change. Disposition:  home    PLAN:  1. Discharge Medication List as of 2022  2:43 PM        2. Follow-up Information     Follow up With Specialties Details Why [facility] EMERGENCY DEPT Emergency Medicine  If symptoms worsen [address] [id number] [phone]    Call your OB/GYN today for further recommendations            Return to ED if worse     Diagnosis     Clinical Impression:   1. Pregnancy, location unknown    2. Threatened         Please note that this dictation was completed with Dragon, computer voice recognition software. Quite often unanticipated grammatical, syntax, homophones, and other interpretive errors are inadvertently transcribed by the computer software. Please disregard these errors. Additionally, please excuse any errors that have escaped final proofreading.

## 2022-01-05 NOTE — DISCHARGE INSTRUCTIONS
Thank you! Thank you for allowing me to care for you in the emergency department. I sincerely hope that you are satisfied with your visit today. It is my goal to provide you with excellent care. Below you will find a list of your labs and imaging from your visit today. Should you have any questions regarding these results please do not hesitate to call the emergency department. Labs -     Recent Results (from the past 12 hour(s))   CBC WITH AUTOMATED DIFF    Collection Time: 01/05/22 12:16 PM   Result Value Ref Range    WBC 12.3 (H) 3.6 - 11.0 K/uL    RBC 4.63 3.80 - 5.20 M/uL    HGB 13.0 11.5 - 16.0 g/dL    HCT 41.7 35.0 - 47.0 %    MCV 90.1 80.0 - 99.0 FL    MCH 28.1 26.0 - 34.0 PG    MCHC 31.2 30.0 - 36.5 g/dL    RDW 15.7 (H) 11.5 - 14.5 %    PLATELET 143 002 - 328 K/uL    MPV 9.5 8.9 - 12.9 FL    NRBC 0.0 0.0  WBC    ABSOLUTE NRBC 0.00 0.00 - 0.01 K/uL    NEUTROPHILS 81 (H) 32 - 75 %    LYMPHOCYTES 11 (L) 12 - 49 %    MONOCYTES 6 5 - 13 %    EOSINOPHILS 1 0 - 7 %    BASOPHILS 1 0 - 1 %    IMMATURE GRANULOCYTES 0 0 - 0.5 %    ABS. NEUTROPHILS 9.9 (H) 1.8 - 8.0 K/UL    ABS. LYMPHOCYTES 1.4 0.8 - 3.5 K/UL    ABS. MONOCYTES 0.8 0.0 - 1.0 K/UL    ABS. EOSINOPHILS 0.1 0.0 - 0.4 K/UL    ABS. BASOPHILS 0.1 0.0 - 0.1 K/UL    ABS. IMM. GRANS. 0.0 0.00 - 0.04 K/UL    DF AUTOMATED     METABOLIC PANEL, COMPREHENSIVE    Collection Time: 01/05/22 12:16 PM   Result Value Ref Range    Sodium 133 (L) 136 - 145 mmol/L    Potassium 4.8 3.5 - 5.1 mmol/L    Chloride 102 97 - 108 mmol/L    CO2 24 21 - 32 mmol/L    Anion gap 7 5 - 15 mmol/L    Glucose 116 (H) 65 - 100 mg/dL    BUN 11 6 - 20 mg/dL    Creatinine 0.71 0.55 - 1.02 mg/dL    BUN/Creatinine ratio 15 12 - 20      GFR est AA >60 >60 ml/min/1.73m2    GFR est non-AA >60 >60 ml/min/1.73m2    Calcium 9.3 8.5 - 10.1 mg/dL    Bilirubin, total 0.3 0.2 - 1.0 mg/dL    AST (SGOT) 28 15 - 37 U/L    ALT (SGPT) 18 12 - 78 U/L    Alk.  phosphatase 113 45 - 117 U/L Protein, total 7.3 6.4 - 8.2 g/dL    Albumin 3.5 3.5 - 5.0 g/dL    Globulin 3.8 2.0 - 4.0 g/dL    A-G Ratio 0.9 (L) 1.1 - 2.2     BETA HCG, QT    Collection Time: 01/05/22 12:16 PM   Result Value Ref Range    Beta HCG, QT 1,461.0 (H) 0 - 6 mIU/mL   URINALYSIS W/ REFLEX CULTURE    Collection Time: 01/05/22 12:55 PM    Specimen: Urine   Result Value Ref Range    Color Yellow/Straw      Appearance Clear Clear      Specific gravity 1.005 1.003 - 1.030      pH (UA) 6.0 5.0 - 8.0      Protein Negative Negative mg/dL    Glucose Negative Negative mg/dL    Ketone Negative Negative mg/dL    Bilirubin Negative Negative      Blood Large (A) Negative      Urobilinogen 0.1 0.1 - 1.0 EU/dL    Nitrites Negative Negative      Leukocyte Esterase Negative Negative      UA:UC IF INDICATED Culture not indicated by UA result Culture not indicated by UA result      WBC 0-4 0 - 4 /hpf    RBC 5-10 0 - 5 /hpf    Bacteria Negative Negative /hpf   BLOOD TYPE, (ABO+RH)    Collection Time: 01/05/22  1:40 PM   Result Value Ref Range    ABO/Rh(D) O Positive        Radiologic Studies -   US UTS TRANSVAGINAL OB   Final Result        CT Results  (Last 48 hours)      None          CXR Results  (Last 48 hours)      None               If you feel that you have not received excellent quality care or timely care, please ask to speak to the nurse manager. Please choose us in the future for your continued health care needs. ------------------------------------------------------------------------------------------------------------  The exam and treatment you received in the Emergency Department were for an urgent problem and are not intended as complete care. It is important that you follow-up with a doctor, nurse practitioner, or physician assistant to:  (1) confirm your diagnosis,  (2) re-evaluation of changes in your illness and treatment, and  (3) for ongoing care.   If your symptoms become worse or you do not improve as expected and you are unable to reach your usual health care provider, you should return to the Emergency Department. We are available 24 hours a day. Please take your discharge instructions with you when you go to your follow-up appointment. If you have any problem arranging a follow-up appointment, contact the Emergency Department immediately. If a prescription has been provided, please have it filled as soon as possible to prevent a delay in treatment. Read the entire medication instruction sheet provided to you by the pharmacy. If you have any questions or reservations about taking the medication due to side effects or interactions with other medications, please call your primary care physician or contact the ER to speak with the charge nurse. Make an appointment with your family doctor or the physician you were referred to for follow-up of this visit as instructed on your discharge paperwork, as this is a mandatory follow-up. Return to the ER if you are unable to be seen or if you are unable to be seen in a timely manner. If you have any problem arranging the follow-up visit, contact the Emergency Department immediately.

## 2022-01-06 ENCOUNTER — OFFICE VISIT (OUTPATIENT)
Dept: OBGYN CLINIC | Age: 42
End: 2022-01-06
Payer: MEDICARE

## 2022-01-06 VITALS — SYSTOLIC BLOOD PRESSURE: 121 MMHG | WEIGHT: 201 LBS | DIASTOLIC BLOOD PRESSURE: 85 MMHG | BODY MASS INDEX: 34.5 KG/M2

## 2022-01-06 DIAGNOSIS — O20.0 THREATENED MISCARRIAGE IN EARLY PREGNANCY: Primary | ICD-10-CM

## 2022-01-06 PROCEDURE — 99202 OFFICE O/P NEW SF 15 MIN: CPT | Performed by: OBSTETRICS & GYNECOLOGY

## 2022-01-06 PROCEDURE — G8427 DOCREV CUR MEDS BY ELIG CLIN: HCPCS | Performed by: OBSTETRICS & GYNECOLOGY

## 2022-01-06 PROCEDURE — 1111F DSCHRG MED/CURRENT MED MERGE: CPT | Performed by: OBSTETRICS & GYNECOLOGY

## 2022-01-06 PROCEDURE — G9717 DOC PT DX DEP/BP F/U NT REQ: HCPCS | Performed by: OBSTETRICS & GYNECOLOGY

## 2022-01-06 PROCEDURE — G8417 CALC BMI ABV UP PARAM F/U: HCPCS | Performed by: OBSTETRICS & GYNECOLOGY

## 2022-01-06 NOTE — PROGRESS NOTES
Umm Gutiérrez is a 37 y.o. female who presents today for the following:  Chief Complaint   Patient presents with    Other     pt. thinks she is about 16 weeks pregnant. ultrasound does not show anything          HPI  Patient is a 71-year-old G2, P4 female who presents today status post ER visit yesterday with bleeding in early pregnancy. Ultrasound at that time and again today reveals no IUP or ectopic pregnancy. Quantitative beta hCGs have been performed. Quantitative hCG yesterday had only risen 100 from the previous determination 17 days ago. OB History        4    Para   3    Term   3            AB        Living   3       SAB        IAB        Ectopic        Molar        Multiple        Live Births   3                   /85   Wt 201 lb (91.2 kg)   Breastfeeding Unknown   BMI 34.50 kg/m²    OBGyn Exam   Patient is a well-developed well-nourished female no apparent distress  She is alert and oriented x3  Ultrasound reveals normal uterus, there is no evidence of ectopic or intrauterine pregnancy, there is a complex area within the endometrium which may represent a failed pregnancy. No results found for this visit on 22. Assessment:  Nonviable pregnancy  Plan: Repeat quantitative beta-hCG in 1 week, if not following significantly we will order methotrexate. No orders of the defined types were placed in this encounter.

## 2022-01-07 DIAGNOSIS — N91.2 AMENORRHEA: Primary | ICD-10-CM
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
## 2022-03-06 ENCOUNTER — HOSPITAL ENCOUNTER (EMERGENCY)
Age: 42
Discharge: HOME | End: 2022-03-06
Attending: STUDENT IN AN ORGANIZED HEALTH CARE EDUCATION/TRAINING PROGRAM
Payer: MEDICARE

## 2022-03-06 VITALS
OXYGEN SATURATION: 100 % | DIASTOLIC BLOOD PRESSURE: 79 MMHG | RESPIRATION RATE: 18 BRPM | HEIGHT: 64 IN | WEIGHT: 175 LBS | SYSTOLIC BLOOD PRESSURE: 121 MMHG | HEART RATE: 89 BPM | TEMPERATURE: 98.4 F | BODY MASS INDEX: 29.88 KG/M2

## 2022-03-06 DIAGNOSIS — F41.1 ANXIETY STATE: Primary | ICD-10-CM

## 2022-03-06 LAB
ALBUMIN SERPL-MCNC: 3.7 G/DL (ref 3.5–5)
ALBUMIN/GLOB SERPL: 0.9 {RATIO} (ref 1.1–2.2)
ALP SERPL-CCNC: 128 U/L (ref 45–117)
ALT SERPL-CCNC: 19 U/L (ref 12–78)
AMPHET UR QL SCN: POSITIVE
ANION GAP SERPL CALC-SCNC: 6 MMOL/L (ref 5–15)
APPEARANCE UR: CLEAR
AST SERPL W P-5'-P-CCNC: 16 U/L (ref 15–37)
BACTERIA URNS QL MICRO: NEGATIVE /HPF
BARBITURATES UR QL SCN: NEGATIVE
BASOPHILS # BLD: 0.1 K/UL (ref 0–0.1)
BASOPHILS NFR BLD: 1 % (ref 0–1)
BENZODIAZ UR QL: NEGATIVE
BILIRUB SERPL-MCNC: 0.4 MG/DL (ref 0.2–1)
BILIRUB UR QL: NEGATIVE
BUN SERPL-MCNC: 8 MG/DL (ref 6–20)
BUN/CREAT SERPL: 11 (ref 12–20)
CA-I BLD-MCNC: 9.5 MG/DL (ref 8.5–10.1)
CANNABINOIDS UR QL SCN: NEGATIVE
CHLORIDE SERPL-SCNC: 105 MMOL/L (ref 97–108)
CO2 SERPL-SCNC: 24 MMOL/L (ref 21–32)
COCAINE UR QL SCN: POSITIVE
COLOR UR: NORMAL
CREAT SERPL-MCNC: 0.73 MG/DL (ref 0.55–1.02)
DIFFERENTIAL METHOD BLD: ABNORMAL
DRUG SCRN COMMENT,DRGCM: ABNORMAL
EOSINOPHIL # BLD: 0.1 K/UL (ref 0–0.4)
EOSINOPHIL NFR BLD: 1 % (ref 0–7)
ERYTHROCYTE [DISTWIDTH] IN BLOOD BY AUTOMATED COUNT: 14.8 % (ref 11.5–14.5)
GLOBULIN SER CALC-MCNC: 4 G/DL (ref 2–4)
GLUCOSE SERPL-MCNC: 93 MG/DL (ref 65–100)
GLUCOSE UR STRIP.AUTO-MCNC: NEGATIVE MG/DL
HCG UR QL: NEGATIVE
HCT VFR BLD AUTO: 44.6 % (ref 35–47)
HGB BLD-MCNC: 14.2 G/DL (ref 11.5–16)
HGB UR QL STRIP: NEGATIVE
IMM GRANULOCYTES # BLD AUTO: 0.1 K/UL (ref 0–0.04)
IMM GRANULOCYTES NFR BLD AUTO: 0 % (ref 0–0.5)
KETONES UR QL STRIP.AUTO: NEGATIVE MG/DL
LEUKOCYTE ESTERASE UR QL STRIP.AUTO: NEGATIVE
LYMPHOCYTES # BLD: 1.3 K/UL (ref 0.8–3.5)
LYMPHOCYTES NFR BLD: 10 % (ref 12–49)
MCH RBC QN AUTO: 27.3 PG (ref 26–34)
MCHC RBC AUTO-ENTMCNC: 31.8 G/DL (ref 30–36.5)
MCV RBC AUTO: 85.8 FL (ref 80–99)
METHADONE UR QL: NEGATIVE
MONOCYTES # BLD: 1 K/UL (ref 0–1)
MONOCYTES NFR BLD: 7 % (ref 5–13)
NEUTS SEG # BLD: 11 K/UL (ref 1.8–8)
NEUTS SEG NFR BLD: 81 % (ref 32–75)
NITRITE UR QL STRIP.AUTO: NEGATIVE
NRBC # BLD: 0 K/UL (ref 0–0.01)
NRBC BLD-RTO: 0 PER 100 WBC
OPIATES UR QL: NEGATIVE
PCP UR QL: NEGATIVE
PH UR STRIP: 6 [PH] (ref 5–8)
PLATELET # BLD AUTO: 357 K/UL (ref 150–400)
PMV BLD AUTO: 10.1 FL (ref 8.9–12.9)
POTASSIUM SERPL-SCNC: 3.7 MMOL/L (ref 3.5–5.1)
PROT SERPL-MCNC: 7.7 G/DL (ref 6.4–8.2)
PROT UR STRIP-MCNC: NEGATIVE MG/DL
RBC # BLD AUTO: 5.2 M/UL (ref 3.8–5.2)
RBC #/AREA URNS HPF: NORMAL /HPF (ref 0–5)
SODIUM SERPL-SCNC: 135 MMOL/L (ref 136–145)
SP GR UR REFRACTOMETRY: 1 (ref 1–1.03)
UA: UC IF INDICATED,UAUC: NORMAL
UROBILINOGEN UR QL STRIP.AUTO: 0.1 EU/DL (ref 0.1–1)
WBC # BLD AUTO: 13.5 K/UL (ref 3.6–11)
WBC URNS QL MICRO: NORMAL /HPF (ref 0–4)

## 2022-03-06 PROCEDURE — 85025 COMPLETE CBC W/AUTO DIFF WBC: CPT

## 2022-03-06 PROCEDURE — 99283 EMERGENCY DEPT VISIT LOW MDM: CPT

## 2022-03-06 PROCEDURE — 36415 COLL VENOUS BLD VENIPUNCTURE: CPT

## 2022-03-06 PROCEDURE — 80307 DRUG TEST PRSMV CHEM ANLYZR: CPT

## 2022-03-06 PROCEDURE — 80053 COMPREHEN METABOLIC PANEL: CPT

## 2022-03-06 PROCEDURE — 81001 URINALYSIS AUTO W/SCOPE: CPT

## 2022-03-06 PROCEDURE — 81025 URINE PREGNANCY TEST: CPT

## 2022-03-06 NOTE — ED TRIAGE NOTES
Pt presents to ED from home c/o anxiety. Pt called EMS herself, extremely tearful in triage. Was seen in December for the same thing.

## 2022-03-07 NOTE — ED NOTES
Pt ambulated A&Ox4, GCS 15 to ED lobby to await ride from boyfriend. This RN had lengthy discussion w/ pt upon discharge. Pt was teary, anxious. Denied SI/HI. I encouraged her to make an appointment tomorrow w/ existing psychiatrist or to reach out to new one via [facility] resource sheet.

## 2022-03-07 NOTE — ED PROVIDER NOTES
EMERGENCY DEPARTMENT HISTORY AND PHYSICAL EXAM      Date: 3/6/2022  Patient Name: Brooke Mahajan    History of Presenting Illness     Chief Complaint   Patient presents with    Anxiety       History Provided By: Patient    HPI: Brooke Mahajan, 37 y.o. female with a past medical history significant Anxiety, depression, PTSD presents to the ED with cc of anxiety. Patient states that she has been feeling overwhelmed due to stressful situations at home, states that her boyfriend sometimes upsets her, sometimes she feels unsafe going home however patient does not actually elaborate any threat of violence. Patient states that this may be due to in advertent drug exposure thinks boyfriend is smoking PCP. Patient denies any medical complaints at this time denies any chest pain shortness of breath abdominal pain nausea vomiting. Patient does admit to smoking marijuana, drinking minimally, smoking cigarettes. No SI HI, no auditory visual hallucinations. Patient states she just wants to speak to somebody about her overwhelming anxiety    There are no other complaints, changes, or physical findings at this time. PCP: None    No current facility-administered medications on file prior to encounter. Current Outpatient Medications on File Prior to Encounter   Medication Sig Dispense Refill    budesonide-formoteroL (SYMBICORT) 160-4.5 mcg/actuation HFAA Take 2 Puffs by inhalation two (2) times daily as needed for PRN Reason (Other) (sob). 10.2 g 0    albuterol (PROVENTIL HFA, VENTOLIN HFA, PROAIR HFA) 90 mcg/actuation inhaler Take 2 Puffs by inhalation every six (6) hours as needed for Wheezing or Shortness of Breath. 18 g 0    prenatal vit no.130-iron-folic 27 mg iron- 039 mcg tab tablet Take 1 Tablet by mouth daily. 30 Tablet 0    amoxicillin (AMOXIL) 500 mg capsule Take 1 Capsule by mouth three (3) times daily. 20 Capsule 0    lurasidone (LATUDA) 60 mg tab tablet Take 1 Tablet by mouth daily (with dinner).  30 Tablet 0    ondansetron (ZOFRAN ODT) 4 mg disintegrating tablet Take 1 Tablet by mouth every eight (8) hours as needed for Nausea or Vomiting. 5 Tablet 0       Past History     Past Medical History:  Past Medical History:   Diagnosis Date    Anxiety     Depression     Drug abuse ([facility] [address]     PTSD (post-traumatic stress disorder)        Past Surgical History:  No past surgical history on file. Family History:  No family history on file. Social History:  Social History     Tobacco Use    Smoking status: Heavy Tobacco Smoker     Packs/day: 1.00    Smokeless tobacco: Never Used   Vaping Use    Vaping Use: Never used   Substance Use Topics    Alcohol use: Yes     Comment: rarely    Drug use: Not Currently       Allergies: Allergies   Allergen Reactions    Nickel Rash         Review of Systems     Review of Systems   Constitutional: Negative for activity change, chills, fatigue and fever. HENT: Negative for congestion and trouble swallowing. Eyes: Negative for photophobia and visual disturbance. Respiratory: Negative for cough, chest tightness and shortness of breath. Cardiovascular: Negative for chest pain and palpitations. Gastrointestinal: Negative for abdominal distention, abdominal pain, diarrhea, nausea and vomiting. Genitourinary: Negative for dysuria, flank pain and frequency. Musculoskeletal: Negative for arthralgias, back pain and myalgias. Skin: Negative for color change, pallor and rash. Neurological: Negative for dizziness, tremors, weakness and headaches. Psychiatric/Behavioral: Negative for confusion, hallucinations, self-injury and suicidal ideas. The patient is nervous/anxious. Physical Exam     Physical Exam  Vitals and nursing note reviewed. Constitutional:       General: She is not in acute distress. Appearance: Normal appearance. She is normal weight. She is not ill-appearing. HENT:      Head: Normocephalic and atraumatic.       Nose: Nose normal. Mouth/Throat:      Mouth: Mucous membranes are moist.   Eyes:      Extraocular Movements: Extraocular movements intact. Pupils: Pupils are equal, round, and reactive to light. Cardiovascular:      Rate and Rhythm: Normal rate and regular rhythm. Pulses: Normal pulses. Pulmonary:      Effort: Pulmonary effort is normal.   Abdominal:      General: Abdomen is flat. Bowel sounds are normal.      Palpations: Abdomen is soft. Tenderness: There is no abdominal tenderness. There is no guarding. Musculoskeletal:         General: No tenderness. Normal range of motion. Cervical back: Normal range of motion and neck supple. No muscular tenderness. Skin:     General: Skin is warm and dry. Neurological:      General: No focal deficit present. Mental Status: She is alert and oriented to person, place, and time. Sensory: No sensory deficit. Motor: No weakness. Psychiatric:         Mood and Affect: Mood is anxious. Speech: Speech normal.         Behavior: Behavior normal.         Cognition and Memory: Cognition normal.         Diagnostic Study Results     Labs -     No results found for this or any previous visit (from the past 12 hour(s)). Radiologic Studies -   @lastxrresult@  CT Results  (Last 48 hours)    None        CXR Results  (Last 48 hours)    None            Medical Decision Making   I am the first provider for this patient. I reviewed the vital signs, available nursing notes, past medical history, past surgical history, family history and social history. Vital Signs-Reviewed the patient's vital signs. No data found.     Records Reviewed: Nursing Notes    The patient presents with anxiety with a differential diagnosis of anxiety reaction, major depressive disorder, substance-induced anxiety panic attack      Provider Notes (Medical Decision Making):     MDM   52-year-old female history of anxiety, depression, substance abuse, presents to emergency department for anxiety, panic attack. Exam shows anxious female, however in no distress no medical complaints at this time    We will draw basic lab work, page behavioral health to evaluate patient however do not anticipate admission. ED Course:   Initial assessment performed. The patients presenting problems have been discussed, and they are in agreement with the care plan formulated and outlined with them. I have encouraged them to ask questions as they arise throughout their visit. ED Course as of 03/08/22 1510   Marlena Dorantes Mar 06, 2022   2142 Behavioral health evaluated patient, gave patient resources to go home with. [PZ]      ED Course User Index  [PZ] Sherley Grossman MD         PROCEDURES  Procedures         PLAN:  1. Discharge Medication List as of 3/6/2022  9:51 PM        2. Follow-up Information     Follow up With Specialties Details Why Contact Info    Behavioral health resources   As needed         Return to ED if worse     Diagnosis     Clinical Impression:   1.  Anxiety state

## 2022-03-13 ENCOUNTER — HOSPITAL ENCOUNTER (EMERGENCY)
Age: 42
Discharge: HOME | End: 2022-03-13
Attending: EMERGENCY MEDICINE
Payer: MEDICARE

## 2022-03-13 VITALS
SYSTOLIC BLOOD PRESSURE: 118 MMHG | OXYGEN SATURATION: 100 % | RESPIRATION RATE: 16 BRPM | BODY MASS INDEX: 29.88 KG/M2 | HEIGHT: 64 IN | DIASTOLIC BLOOD PRESSURE: 72 MMHG | TEMPERATURE: 97.9 F | HEART RATE: 84 BPM | WEIGHT: 175 LBS

## 2022-03-13 DIAGNOSIS — R51.9 NONINTRACTABLE HEADACHE, UNSPECIFIED CHRONICITY PATTERN, UNSPECIFIED HEADACHE TYPE: Primary | ICD-10-CM

## 2022-03-13 LAB
AMPHET UR QL SCN: POSITIVE
ANION GAP SERPL CALC-SCNC: 6 MMOL/L (ref 5–15)
APPEARANCE UR: CLEAR
BACTERIA URNS QL MICRO: NEGATIVE /HPF
BACTERIA URNS QL MICRO: NEGATIVE /HPF
BARBITURATES UR QL SCN: NEGATIVE
BASOPHILS # BLD: 0.1 K/UL (ref 0–0.1)
BASOPHILS NFR BLD: 1 % (ref 0–1)
BENZODIAZ UR QL: NEGATIVE
BILIRUB UR QL: NEGATIVE
BUN SERPL-MCNC: 12 MG/DL (ref 6–20)
BUN/CREAT SERPL: 16 (ref 12–20)
CA-I BLD-MCNC: 8.7 MG/DL (ref 8.5–10.1)
CANNABINOIDS UR QL SCN: NEGATIVE
CHLORIDE SERPL-SCNC: 107 MMOL/L (ref 97–108)
CO2 SERPL-SCNC: 26 MMOL/L (ref 21–32)
COCAINE UR QL SCN: POSITIVE
COLOR UR: ABNORMAL
CREAT SERPL-MCNC: 0.76 MG/DL (ref 0.55–1.02)
DIFFERENTIAL METHOD BLD: ABNORMAL
DRUG SCRN COMMENT,DRGCM: ABNORMAL
EOSINOPHIL # BLD: 0.1 K/UL (ref 0–0.4)
EOSINOPHIL NFR BLD: 1 % (ref 0–7)
ERYTHROCYTE [DISTWIDTH] IN BLOOD BY AUTOMATED COUNT: 15.2 % (ref 11.5–14.5)
GLUCOSE SERPL-MCNC: 129 MG/DL (ref 65–100)
GLUCOSE UR STRIP.AUTO-MCNC: NEGATIVE MG/DL
HCG UR QL: NEGATIVE
HCT VFR BLD AUTO: 42.2 % (ref 35–47)
HGB BLD-MCNC: 13.4 G/DL (ref 11.5–16)
HGB UR QL STRIP: ABNORMAL
IMM GRANULOCYTES # BLD AUTO: 0.1 K/UL (ref 0–0.04)
IMM GRANULOCYTES NFR BLD AUTO: 1 % (ref 0–0.5)
KETONES UR QL STRIP.AUTO: NEGATIVE MG/DL
LEUKOCYTE ESTERASE UR QL STRIP.AUTO: NEGATIVE
LYMPHOCYTES # BLD: 0.9 K/UL (ref 0.8–3.5)
LYMPHOCYTES NFR BLD: 9 % (ref 12–49)
MCH RBC QN AUTO: 27.6 PG (ref 26–34)
MCHC RBC AUTO-ENTMCNC: 31.8 G/DL (ref 30–36.5)
MCV RBC AUTO: 86.8 FL (ref 80–99)
METHADONE UR QL: NEGATIVE
MONOCYTES # BLD: 0.7 K/UL (ref 0–1)
MONOCYTES NFR BLD: 6 % (ref 5–13)
NEUTS SEG # BLD: 9 K/UL (ref 1.8–8)
NEUTS SEG NFR BLD: 82 % (ref 32–75)
NITRITE UR QL STRIP.AUTO: NEGATIVE
NRBC # BLD: 0 K/UL (ref 0–0.01)
NRBC BLD-RTO: 0 PER 100 WBC
OPIATES UR QL: NEGATIVE
PCP UR QL: NEGATIVE
PH UR STRIP: 6 [PH] (ref 5–8)
PLATELET # BLD AUTO: 354 K/UL (ref 150–400)
PMV BLD AUTO: 9.7 FL (ref 8.9–12.9)
POTASSIUM SERPL-SCNC: 4.3 MMOL/L (ref 3.5–5.1)
PROT UR STRIP-MCNC: NEGATIVE MG/DL
RBC # BLD AUTO: 4.86 M/UL (ref 3.8–5.2)
RBC #/AREA URNS HPF: ABNORMAL /HPF (ref 0–5)
RBC #/AREA URNS HPF: NORMAL /HPF (ref 0–5)
SODIUM SERPL-SCNC: 139 MMOL/L (ref 136–145)
SP GR UR REFRACTOMETRY: <1.005 (ref 1–1.03)
UROBILINOGEN UR QL STRIP.AUTO: 0.1 EU/DL (ref 0.1–1)
WBC # BLD AUTO: 10.8 K/UL (ref 3.6–11)
WBC URNS QL MICRO: ABNORMAL /HPF (ref 0–4)
WBC URNS QL MICRO: NORMAL /HPF (ref 0–4)

## 2022-03-13 PROCEDURE — 74011250636 HC RX REV CODE- 250/636: Performed by: EMERGENCY MEDICINE

## 2022-03-13 PROCEDURE — 36415 COLL VENOUS BLD VENIPUNCTURE: CPT

## 2022-03-13 PROCEDURE — 80048 BASIC METABOLIC PNL TOTAL CA: CPT

## 2022-03-13 PROCEDURE — 81001 URINALYSIS AUTO W/SCOPE: CPT

## 2022-03-13 PROCEDURE — 99284 EMERGENCY DEPT VISIT MOD MDM: CPT

## 2022-03-13 PROCEDURE — 96375 TX/PRO/DX INJ NEW DRUG ADDON: CPT

## 2022-03-13 PROCEDURE — 81025 URINE PREGNANCY TEST: CPT

## 2022-03-13 PROCEDURE — 80307 DRUG TEST PRSMV CHEM ANLYZR: CPT

## 2022-03-13 PROCEDURE — 85025 COMPLETE CBC W/AUTO DIFF WBC: CPT

## 2022-03-13 PROCEDURE — 96374 THER/PROPH/DIAG INJ IV PUSH: CPT

## 2022-03-13 RX ORDER — KETOROLAC TROMETHAMINE 10 MG/1
10 TABLET, FILM COATED ORAL
Qty: 20 TABLET | Refills: 0 | Status: SHIPPED | OUTPATIENT
Start: 2022-03-13 | End: 2022-03-18

## 2022-03-13 RX ORDER — KETOROLAC TROMETHAMINE 15 MG/ML
15 INJECTION, SOLUTION INTRAMUSCULAR; INTRAVENOUS
Status: COMPLETED | OUTPATIENT
Start: 2022-03-13 | End: 2022-03-13

## 2022-03-13 RX ORDER — METOCLOPRAMIDE HYDROCHLORIDE 5 MG/ML
10 INJECTION INTRAMUSCULAR; INTRAVENOUS
Status: COMPLETED | OUTPATIENT
Start: 2022-03-13 | End: 2022-03-13

## 2022-03-13 RX ORDER — DIPHENHYDRAMINE HYDROCHLORIDE 50 MG/ML
25 INJECTION, SOLUTION INTRAMUSCULAR; INTRAVENOUS
Status: COMPLETED | OUTPATIENT
Start: 2022-03-13 | End: 2022-03-13

## 2022-03-13 RX ADMIN — KETOROLAC TROMETHAMINE 15 MG: 15 INJECTION, SOLUTION INTRAMUSCULAR; INTRAVENOUS at 13:20

## 2022-03-13 RX ADMIN — DIPHENHYDRAMINE HYDROCHLORIDE 25 MG: 50 INJECTION INTRAMUSCULAR; INTRAVENOUS at 13:19

## 2022-03-13 RX ADMIN — METOCLOPRAMIDE HYDROCHLORIDE 10 MG: 5 INJECTION INTRAMUSCULAR; INTRAVENOUS at 13:20

## 2022-03-13 RX ADMIN — METHYLPREDNISOLONE SODIUM SUCCINATE 125 MG: 125 INJECTION, POWDER, FOR SOLUTION INTRAMUSCULAR; INTRAVENOUS at 13:20

## 2022-03-13 NOTE — ED PROVIDER NOTES
EMERGENCY DEPARTMENT HISTORY AND PHYSICAL EXAM      Date: 3/13/2022  Patient Name: Fede Lopez    History of Presenting Illness     Chief Complaint   Patient presents with    Headache       History Provided By: Patient    HPI: Fede Lopez, 37 y.o. female with a past medical history significant Anxiety, depression, PTSD, drug abuse presents to the ED with cc of headache. Patient reports the headache is diffuse, not maximal at onset. Patient denies neck pain, denies fevers or chills. Patient denies radiation of the pain, denies noted aggravating alleviating factors. There are no other complaints, changes, or physical findings at this time. PCP: None    No current facility-administered medications on file prior to encounter. Current Outpatient Medications on File Prior to Encounter   Medication Sig Dispense Refill    budesonide-formoteroL (SYMBICORT) 160-4.5 mcg/actuation HFAA Take 2 Puffs by inhalation two (2) times daily as needed for PRN Reason (Other) (sob). 10.2 g 0    albuterol (PROVENTIL HFA, VENTOLIN HFA, PROAIR HFA) 90 mcg/actuation inhaler Take 2 Puffs by inhalation every six (6) hours as needed for Wheezing or Shortness of Breath. 18 g 0    prenatal vit no.130-iron-folic 27 mg iron- 174 mcg tab tablet Take 1 Tablet by mouth daily. 30 Tablet 0    amoxicillin (AMOXIL) 500 mg capsule Take 1 Capsule by mouth three (3) times daily. 20 Capsule 0    lurasidone (LATUDA) 60 mg tab tablet Take 1 Tablet by mouth daily (with dinner). 30 Tablet 0    ondansetron (ZOFRAN ODT) 4 mg disintegrating tablet Take 1 Tablet by mouth every eight (8) hours as needed for Nausea or Vomiting. 5 Tablet 0       Past History     Past Medical History:  Past Medical History:   Diagnosis Date    Anxiety     Depression     Drug abuse ([facility] [address]     PTSD (post-traumatic stress disorder)        Past Surgical History:  No past surgical history on file. Family History:  History reviewed.  No pertinent family history. Social History:  Social History     Tobacco Use    Smoking status: Heavy Tobacco Smoker     Packs/day: 1.00    Smokeless tobacco: Never Used   Vaping Use    Vaping Use: Never used   Substance Use Topics    Alcohol use: Yes     Comment: rarely    Drug use: Not Currently       Allergies: Allergies   Allergen Reactions    Nickel Rash         Review of Systems   Review of Systems   Constitutional: Negative for chills and fever. HENT: Negative for sinus pressure and sinus pain. Eyes: Negative for photophobia and redness. Respiratory: Negative for shortness of breath and wheezing. Cardiovascular: Negative for chest pain and palpitations. Gastrointestinal: Negative for abdominal pain and nausea. Genitourinary: Negative for flank pain and hematuria. Musculoskeletal: Negative for arthralgias and gait problem. Skin: Negative for color change and pallor. Neurological: Negative for dizziness and weakness. Review of Systems    Physical Exam   Physical Exam  Constitutional:       General: No acute distress. Appearance: Normal appearance. Not toxic-appearing. HENT:      Head: Normocephalic and atraumatic. Nose: Nose normal.      Mouth/Throat:      Mouth: Mucous membranes are moist.   Eyes:      Extraocular Movements: Extraocular movements intact. Pupils: Pupils are equal, round, and reactive to light. Cardiovascular:      Rate and Rhythm: Normal rate. Pulses: Normal pulses. Pulmonary:      Effort: Pulmonary effort is normal.      Breath sounds: No stridor. Abdominal:      General: Abdomen is flat. There is no distension. Musculoskeletal:         General: Normal range of motion. Cervical back: Normal range of motion and neck supple. Supple with no meningismus  Skin:     General: Skin is warm and dry. Capillary Refill: Capillary refill takes less than 2 seconds. Neurological:      General: No focal deficit present.   Cranial nerves II through XII intact. Normal gait. Mental Status: Aert and oriented to person, place, and time. Psychiatric:         Mood and Affect: Somewhat anxious appearing. Behavior: Behavior normal.       Physical Exam    Lab and Diagnostic Study Results     Labs -   No results found for this or any previous visit (from the past 12 hour(s)). Radiologic Studies -   @lastxrresult@  CT Results  (Last 48 hours)    None        CXR Results  (Last 48 hours)    None            Medical Decision Making   - I am the first provider for this patient. - I reviewed the vital signs, available nursing notes, past medical history, past surgical history, family history and social history. - Initial assessment performed. The patients presenting problems have been discussed, and they are in agreement with the care plan formulated and outlined with them. I have encouraged them to ask questions as they arise throughout their visit. Vital Signs-Reviewed the patient's vital signs. Patient Vitals for the past 12 hrs:   Temp Pulse Resp BP SpO2   03/13/22 1241 97.5 °F (36.4 °C) 83 18 119/73 100 %       Records Reviewed: Old Medical Records      ED Course:     ED Course as of 03/13/22 1605   Sun Mar 13, 2022   1603 Patient reports feeling better at this time. Discussed otherwise unremarkable work-up, need for close follow-up as well as return precautions. [CS]      ED Course User Index  [CS] Alvaro Huertas MD     Provider Notes (Medical Decision Making):   Patient with no focal neuro deficits, supple neck with no meningismus. Recent emergency room visit did show urine positive for cocaine and amphetamines. Patient states she is ready for treatment at home at this time, understands need for close follow-up as well as return precautions. MDM           Disposition   Disposition: DC- Adult Discharges: All of the diagnostic tests were reviewed and questions answered. Diagnosis, care plan and treatment options were discussed.   The patient understands the instructions and will follow up as directed. The patients results have been reviewed with them. They have been counseled regarding their diagnosis. The patient verbally convey understanding and agreement of the signs, symptoms, diagnosis, treatment and prognosis and additionally agrees to follow up as recommended with their PCP in 24 - 48 hours. They also agree with the care-plan and convey that all of their questions have been answered. I have also put together some discharge instructions for them that include: 1) educational information regarding their diagnosis, 2) how to care for their diagnosis at home, as well a 3) list of reasons why they would want to return to the ED prior to their follow-up appointment, should their condition change. DISCHARGE PLAN:  1. Current Discharge Medication List      CONTINUE these medications which have NOT CHANGED    Details   budesonide-formoteroL (SYMBICORT) 160-4.5 mcg/actuation HFAA Take 2 Puffs by inhalation two (2) times daily as needed for PRN Reason (Other) (sob). Qty: 10.2 g, Refills: 0      albuterol (PROVENTIL HFA, VENTOLIN HFA, PROAIR HFA) 90 mcg/actuation inhaler Take 2 Puffs by inhalation every six (6) hours as needed for Wheezing or Shortness of Breath. Qty: 18 g, Refills: 0      prenatal vit no.130-iron-folic 27 mg iron- 717 mcg tab tablet Take 1 Tablet by mouth daily. Qty: 30 Tablet, Refills: 0      amoxicillin (AMOXIL) 500 mg capsule Take 1 Capsule by mouth three (3) times daily. Qty: 20 Capsule, Refills: 0      lurasidone (LATUDA) 60 mg tab tablet Take 1 Tablet by mouth daily (with dinner). Qty: 30 Tablet, Refills: 0      ondansetron (ZOFRAN ODT) 4 mg disintegrating tablet Take 1 Tablet by mouth every eight (8) hours as needed for Nausea or Vomiting. Qty: 5 Tablet, Refills: 0           2. Follow-up Information    None       3. Return to ED if worse   4.    Current Discharge Medication List            Diagnosis Clinical Impression:   1. Nonintractable headache, unspecified chronicity pattern, unspecified headache type        Attestations:    Timmy Geuvara MD    Please note that this dictation was completed with Calm, the computer voice recognition software. Quite often unanticipated grammatical, syntax, homophones, and other interpretive errors are inadvertently transcribed by the computer software. Please disregard these errors. Please excuse any errors that have escaped final proofreading. Thank you.

## 2022-03-13 NOTE — Clinical Note
[address] [facility] EMERGENCY DEPT  [facility] [place] [address]  [phone]    Work/School Note    Date: 3/13/2022    To Whom It May concern:    Jean Quintana was seen and treated today in the emergency room by the following provider(s):  Attending Provider: Anthony Landaverde MD.      Jean Quintana is excused from work/school on 03/13/22 and 03/14/22. She is medically clear to return to work/school on 3/15/2022.        Sincerely,          Omkar Ahuja MD

## 2022-03-13 NOTE — ED NOTES
Pt questions regarding resources answered, advised to utilized housing crisis line since she doesn't feel like she can sort out her social complaints at home and utilize [employer] counselling services as well for advice and support. Pt ambulatory, denies SI/HI.  Given discharge summary and all questions answered by Taisha Rosenberg

## 2022-03-13 NOTE — DISCHARGE INSTRUCTIONS
Thank you! Thank you for allowing me to care for you in the emergency department. I sincerely hope that you are satisfied with your visit today. It is my goal to provide you with excellent care. Below you will find a list of your labs and imaging from your visit today. Should you have any questions regarding these results please do not hesitate to call the emergency department. Labs -     Recent Results (from the past 12 hour(s))   METABOLIC PANEL, BASIC    Collection Time: 03/13/22  1:17 PM   Result Value Ref Range    Sodium 139 136 - 145 mmol/L    Potassium 4.3 3.5 - 5.1 mmol/L    Chloride 107 97 - 108 mmol/L    CO2 26 21 - 32 mmol/L    Anion gap 6 5 - 15 mmol/L    Glucose 129 (H) 65 - 100 mg/dL    BUN 12 6 - 20 mg/dL    Creatinine 0.76 0.55 - 1.02 mg/dL    BUN/Creatinine ratio 16 12 - 20      GFR est AA >60 >60 ml/min/1.73m2    GFR est non-AA >60 >60 ml/min/1.73m2    Calcium 8.7 8.5 - 10.1 mg/dL   CBC WITH AUTOMATED DIFF    Collection Time: 03/13/22  1:17 PM   Result Value Ref Range    WBC 10.8 3.6 - 11.0 K/uL    RBC 4.86 3.80 - 5.20 M/uL    HGB 13.4 11.5 - 16.0 g/dL    HCT 42.2 35.0 - 47.0 %    MCV 86.8 80.0 - 99.0 FL    MCH 27.6 26.0 - 34.0 PG    MCHC 31.8 30.0 - 36.5 g/dL    RDW 15.2 (H) 11.5 - 14.5 %    PLATELET 993 871 - 620 K/uL    MPV 9.7 8.9 - 12.9 FL    NRBC 0.0 0.0  WBC    ABSOLUTE NRBC 0.00 0.00 - 0.01 K/uL    NEUTROPHILS 82 (H) 32 - 75 %    LYMPHOCYTES 9 (L) 12 - 49 %    MONOCYTES 6 5 - 13 %    EOSINOPHILS 1 0 - 7 %    BASOPHILS 1 0 - 1 %    IMMATURE GRANULOCYTES 1 (H) 0 - 0.5 %    ABS. NEUTROPHILS 9.0 (H) 1.8 - 8.0 K/UL    ABS. LYMPHOCYTES 0.9 0.8 - 3.5 K/UL    ABS. MONOCYTES 0.7 0.0 - 1.0 K/UL    ABS. EOSINOPHILS 0.1 0.0 - 0.4 K/UL    ABS. BASOPHILS 0.1 0.0 - 0.1 K/UL    ABS. IMM.  GRANS. 0.1 (H) 0.00 - 0.04 K/UL    DF AUTOMATED         Radiologic Studies -   No orders to display     CT Results  (Last 48 hours)      None          CXR Results  (Last 48 hours)      None If you feel that you have not received excellent quality care or timely care, please ask to speak to the nurse manager. Please choose us in the future for your continued health care needs. ------------------------------------------------------------------------------------------------------------  The exam and treatment you received in the Emergency Department were for an urgent problem and are not intended as complete care. It is important that you follow-up with a doctor, nurse practitioner, or physician assistant to:  (1) confirm your diagnosis,  (2) re-evaluation of changes in your illness and treatment, and  (3) for ongoing care. If your symptoms become worse or you do not improve as expected and you are unable to reach your usual health care provider, you should return to the Emergency Department. We are available 24 hours a day. Please take your discharge instructions with you when you go to your follow-up appointment. If you have any problem arranging a follow-up appointment, contact the Emergency Department immediately. If a prescription has been provided, please have it filled as soon as possible to prevent a delay in treatment. Read the entire medication instruction sheet provided to you by the pharmacy. If you have any questions or reservations about taking the medication due to side effects or interactions with other medications, please call your primary care physician or contact the ER to speak with the charge nurse. Make an appointment with your family doctor or the physician you were referred to for follow-up of this visit as instructed on your discharge paperwork, as this is a mandatory follow-up. Return to the ER if you are unable to be seen or if you are unable to be seen in a timely manner. If you have any problem arranging the follow-up visit, contact the Emergency Department immediately.

## 2022-03-13 NOTE — ED NOTES
Pt medically cleared, states to writer that she doesn't feel safe going home when discussing discharge. Pt has history of paranoid anxiety and can nto give any specific reasons she isn't safe home just stating that its a feeling.  EVA Briones contacted for assessment and anxiety mgmt resources

## 2022-03-13 NOTE — ED TRIAGE NOTES
Pt reports \"pain in her brain\" Pt states she is confused. Having difficulty answering questions. Reports symptoms worsening.

## 2022-03-18 PROBLEM — N91.2 AMENORRHEA: Status: ACTIVE | Noted: 2021-12-14

## 2022-03-19 PROBLEM — F19.10 POLYSUBSTANCE ABUSE (HCC): Status: ACTIVE | Noted: 2021-12-14

## 2022-03-19 PROBLEM — Z32.01 POSITIVE BLOOD PREGNANCY TEST: Status: ACTIVE | Noted: 2021-12-14

## 2022-03-19 PROBLEM — F41.9 ANXIETY: Status: ACTIVE | Noted: 2021-12-12

## 2022-03-20 PROBLEM — F32.A DEPRESSION: Status: ACTIVE | Noted: 2021-12-12

## 2022-07-08 ENCOUNTER — OFFICE VISIT (OUTPATIENT)
Dept: OBGYN CLINIC | Age: 42
End: 2022-07-08

## 2022-07-08 VITALS
DIASTOLIC BLOOD PRESSURE: 76 MMHG | WEIGHT: 180 LBS | BODY MASS INDEX: 30.73 KG/M2 | HEIGHT: 64 IN | SYSTOLIC BLOOD PRESSURE: 110 MMHG

## 2022-07-08 DIAGNOSIS — Z11.3 SCREENING EXAMINATION FOR VENEREAL DISEASE: ICD-10-CM

## 2022-07-08 DIAGNOSIS — Z12.4 SCREENING FOR MALIGNANT NEOPLASM OF CERVIX: Primary | ICD-10-CM

## 2022-07-08 DIAGNOSIS — N91.2 AMENORRHEA: ICD-10-CM

## 2022-07-08 DIAGNOSIS — Z01.419 ROUTINE GYNECOLOGICAL EXAMINATION: ICD-10-CM

## 2022-07-08 PROBLEM — O09.522 MULTIGRAVIDA OF ADVANCED MATERNAL AGE IN SECOND TRIMESTER: Status: ACTIVE | Noted: 2022-07-08

## 2022-07-08 PROCEDURE — G0101 CA SCREEN;PELVIC/BREAST EXAM: HCPCS | Performed by: OBSTETRICS & GYNECOLOGY

## 2022-07-08 PROCEDURE — G9717 DOC PT DX DEP/BP F/U NT REQ: HCPCS | Performed by: OBSTETRICS & GYNECOLOGY

## 2022-07-08 PROCEDURE — G8417 CALC BMI ABV UP PARAM F/U: HCPCS | Performed by: OBSTETRICS & GYNECOLOGY

## 2022-07-08 NOTE — PROGRESS NOTES
Nancy Montemayor is a , 37 y.o. female   Patient's last menstrual period was 2022 (approximate). She presents for her annual    She is having abdominal pain- currently pregnant- very emotional- lots going on domestically- DWP if she feels safe- says has no where to go- offered to call for help she declined. Menstrual status:  Cycles are pregnant. Flow: absent. She does not have dysmenorrhea. Medical conditions:  Since her last annual GYN exam about ? years ago, she has not the following changes in her health history: none. Mammogram History:    [facility] Results (most recent):  Results from Hospital Encounter encounter on 21    FILOMENA 3D HUSAM W MAMMO BI SCREENING INCL CAD    Narrative  BILATERAL DIGITAL SCREENING MAMMOGRAM WITH CAD AND TOMOSYNTHESIS:    HISTORY:  Asymptomatic. COMPARISON:  The requested outside mammograms are not available for comparison. TECHNIQUE:  Digital 2D-3D MLO and CC views obtained and reviewed. Computer Aided  Detection (CAD) was used in evaluating this mammogram.    FINDINGS:  The breast parenchyma is heterogeneously dense which may mask small  masses. No suspicious dominant mass, calcifications or architectural distortion  is identified. No significant interval change. Impression  No mammographic evidence of malignancy. ASSESSMENT CODE:  BIRADS CATEGORY:  1: Negative    RECOMMENDATIONS:  Routine 1-Year Mammographic Followup    NCI lifetime breast cancer risk 7.3%. Geraldo Zambrano 5 year breast cancer risk 0.4%. According to the [address], yearly mammograms are recommended  starting at age 36 and continuing, as long as, a woman is in good health. Clinical breast exams should be part of a periodic health exam about every 3  years for women in their 19's and 29's, and every year for women 40 and over. Breast self-exam is an option for women starting in their 20's.   Any breast  change noted on a breast self-exam should be reported promptly to the patient's  healthcare provider. Breast MRI is recommended for women with an approximately  20-25% or greater lifetime risk of breast cancer, including women with a strong  family history of breast or ovarian cancer and women who have been treated for  Hodgkin's disease. A negative Mammography report should not discourage follow  up or biopsy of a clinically significant finding and/or abnormality. Dense  breast tissue may obscure small neoplasms. DEXA Results (most recent):  No results found for this or any previous visit. Past Medical History:   Diagnosis Date    AMA (advanced maternal age) multigravida 35+     Anxiety     Depression     Drug abuse ([facility] [address]     PTSD (post-traumatic stress disorder)      History reviewed. No pertinent surgical history. Prior to Admission medications    Medication Sig Start Date End Date Taking? Authorizing Provider   budesonide-formoteroL (SYMBICORT) 160-4.5 mcg/actuation HFAA Take 2 Puffs by inhalation two (2) times daily as needed for PRN Reason (Other) (sob). 12/22/21  Yes Saad Bruce MD   albuterol (PROVENTIL HFA, VENTOLIN HFA, PROAIR HFA) 90 mcg/actuation inhaler Take 2 Puffs by inhalation every six (6) hours as needed for Wheezing or Shortness of Breath. 12/22/21  Yes Saad Bruce MD   prenatal vit no.130-iron-folic 27 mg iron- 397 mcg tab tablet Take 1 Tablet by mouth daily. 12/23/21  Yes Saad Bruce MD       Allergies   Allergen Reactions    Nickel Rash          Tobacco History:  reports that she has been smoking. She has been smoking about 1.00 pack per day. She has never used smokeless tobacco.  Alcohol use:  reports current alcohol use. Drug use:  reports previous drug use. Drug: Marijuana. Family Medical/Cancer History:   Family History   Family history unknown: Yes          Review of Systems   Constitutional: Positive for malaise/fatigue. Negative for chills, fever and weight loss.    HENT: Negative for congestion, ear pain, sinus pain and tinnitus. Eyes: Negative for blurred vision and double vision. Respiratory: Negative for cough, shortness of breath and wheezing. Cardiovascular: Negative for chest pain and palpitations. Gastrointestinal: Positive for abdominal pain. Negative for blood in stool, constipation, diarrhea, heartburn, nausea and vomiting. Genitourinary: Positive for frequency. Negative for dysuria, flank pain, hematuria and urgency. Musculoskeletal: Negative for joint pain and myalgias. Skin: Negative for itching and rash. Neurological: Negative for dizziness, weakness and headaches. Psychiatric/Behavioral: Negative for depression, memory loss and suicidal ideas. The patient is not nervous/anxious and does not have insomnia. Physical Exam  Constitutional:       Appearance: Normal appearance. HENT:      Head: Normocephalic and atraumatic. Abdominal:      General: Abdomen is flat. Palpations: Abdomen is soft. Comments: Gravid  + FHT's on US   Genitourinary:     General: Normal vulva. Vagina: Normal.      Cervix: Normal.      Uterus: Normal.       Adnexa: Right adnexa normal and left adnexa normal.      Rectum: Normal.      Comments: PAP Obtained  Neurological:      Mental Status: She is alert. Psychiatric:         Mood and Affect: Mood normal.         Behavior: Behavior normal.         Thought Content: Thought content normal.          Visit Vitals  /76 (BP 1 Location: Left upper arm, BP Patient Position: Sitting, BP Cuff Size: Large adult)   Ht 5' 4\" (1.626 m)   Wt 180 lb (81.6 kg)   LMP 03/07/2022 (Approximate)   BMI 30.90 kg/m²         Assessment:   Diagnoses and all orders for this visit:    1. Screening for malignant neoplasm of cervix  -     PAP IG, CT-NG, RFX APTIMA HPV ASCUS ([id number], [id number])    2. Routine gynecological examination  -     PAP IG, CT-NG, RFX APTIMA HPV ASCUS ([id number], [id number])    3.  Screening examination for venereal disease  - PAP IG, CT-NG, RFX APTIMA HPV ASCUS ([id number], [id number])    4. Amenorrhea    PNV every day  Miralax  OTC meds [facility] appt in 2 weeks  MaterniT info given  Labs at next visit    Plan: Questions addressed  Counseled re: diet, exercise, healthy lifestyle        Follow-up and Dispositions    · Return in about 4 weeks (around 8/5/2022) for OB Visit.

## 2022-07-08 NOTE — PROGRESS NOTES
Chief Complaint   Patient presents with    Annual Exam     States seen @ [facility] 6-10-22 for pelvic pain & was told she is 13 wks pregnant. No copy of US report.  Will call [facility] to obtain report       Visit Vitals  /76 (BP 1 Location: Left upper arm, BP Patient Position: Sitting, BP Cuff Size: Large adult)   Ht 5' 4\" (1.626 m)   Wt 180 lb (81.6 kg)   LMP 03/07/2022 (Approximate)   BMI 30.90 kg/m²

## 2022-07-12 DIAGNOSIS — O09.522 AMA (ADVANCED MATERNAL AGE) MULTIGRAVIDA 35+, SECOND TRIMESTER: Primary | ICD-10-CM

## 2022-07-12 LAB
C TRACH RRNA CVX QL NAA+PROBE: NEGATIVE
CYTOLOGIST CVX/VAG CYTO: NORMAL
CYTOLOGY CVX/VAG DOC CYTO: NORMAL
CYTOLOGY CVX/VAG DOC THIN PREP: NORMAL
DX ICD CODE: NORMAL
LABCORP, 190119: NORMAL
Lab: NORMAL
N GONORRHOEA RRNA CVX QL NAA+PROBE: NEGATIVE
OTHER STN SPEC: NORMAL
STAT OF ADQ CVX/VAG CYTO-IMP: NORMAL

## 2022-07-19 ENCOUNTER — APPOINTMENT (OUTPATIENT)
Dept: GENERAL RADIOLOGY | Age: 42
End: 2022-07-19
Attending: EMERGENCY MEDICINE
Payer: MEDICARE

## 2022-07-19 ENCOUNTER — HOSPITAL ENCOUNTER (EMERGENCY)
Age: 42
Discharge: HOME | End: 2022-07-19
Attending: EMERGENCY MEDICINE
Payer: MEDICARE

## 2022-07-19 VITALS
TEMPERATURE: 98.4 F | HEART RATE: 101 BPM | OXYGEN SATURATION: 97 % | SYSTOLIC BLOOD PRESSURE: 110 MMHG | BODY MASS INDEX: 29.99 KG/M2 | DIASTOLIC BLOOD PRESSURE: 74 MMHG | HEIGHT: 65 IN | RESPIRATION RATE: 18 BRPM | WEIGHT: 180 LBS

## 2022-07-19 DIAGNOSIS — M25.561 ARTHRALGIA OF BOTH LOWER LEGS: Primary | ICD-10-CM

## 2022-07-19 DIAGNOSIS — M25.562 ARTHRALGIA OF BOTH LOWER LEGS: Primary | ICD-10-CM

## 2022-07-19 DIAGNOSIS — S80.812A ABRASION OF LEFT LOWER EXTREMITY, INITIAL ENCOUNTER: ICD-10-CM

## 2022-07-19 DIAGNOSIS — F41.9 ANXIETY: ICD-10-CM

## 2022-07-19 PROCEDURE — 73590 X-RAY EXAM OF LOWER LEG: CPT

## 2022-07-19 PROCEDURE — 99285 EMERGENCY DEPT VISIT HI MDM: CPT

## 2022-07-19 PROCEDURE — 75810000467 HC TRAUMA RESPONSE LVL III PARITIAL ACTIVATION

## 2022-07-19 PROCEDURE — 74011250637 HC RX REV CODE- 250/637: Performed by: EMERGENCY MEDICINE

## 2022-07-19 PROCEDURE — 36415 COLL VENOUS BLD VENIPUNCTURE: CPT

## 2022-07-19 PROCEDURE — 73610 X-RAY EXAM OF ANKLE: CPT

## 2022-07-19 PROCEDURE — 99283 EMERGENCY DEPT VISIT LOW MDM: CPT

## 2022-07-19 RX ORDER — ACETAMINOPHEN 500 MG
1000 TABLET ORAL
Status: COMPLETED | OUTPATIENT
Start: 2022-07-19 | End: 2022-07-19

## 2022-07-19 RX ADMIN — ACETAMINOPHEN 1000 MG: 500 TABLET ORAL at 17:44

## 2022-07-19 NOTE — Clinical Note
[facility] EMERGENCY DEPT  [address] [place] [id number]  [phone]    Work/School Note    Date: 7/19/2022    To Whom It May concern:    Angi Reeder was seen and treated today in the emergency room by the following provider(s):  Attending Provider: Liliya Henderson MD.      Angi Reeder is excused from work/school on 07/19/22 and 07/20/22. She is medically clear to return to work/school on 7/21/2022.        Sincerely,          Wiley Dawson
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
## 2022-07-19 NOTE — Clinical Note
[address] [facility] EMERGENCY DEPT  [address] [place] [address] [place] [address]  [phone]    Work/School Note    Date: 7/19/2022    To Whom It May concern:    Stephanie Odonnell was seen and treated today in the emergency room by the following provider(s):  Attending Provider: Yeny Segura MD.      Stephanie Odonnell is excused from work/school on 07/19/22 and 07/20/22. She is medically clear to return to work/school on 7/21/2022.        Sincerely,          Kae Claire MD

## 2022-07-19 NOTE — DISCHARGE INSTRUCTIONS
Thank you! Thank you for allowing me to care for you in the emergency department. It is my goal to provide you with excellent care. If you have not received excellent quality care, please ask to speak to the nurse manager. Please fill out the survey that will come to you by mail or email since we listen to your feedback! Below you will find a list of your tests from today's visit. Should you have any questions, please do not hesitate to call the emergency department. Labs  No results found for this or any previous visit (from the past 12 hour(s)). Radiologic Studies  XR TIB/FIB LT   Final Result   No acute fracture. XR TIB/FIB RT   Final Result   No acute fracture. XR ANKLE RT MIN 3 V   Final Result   No acute fracture or dislocation. CT Results  (Last 48 hours)      None          CXR Results  (Last 48 hours)      None          ------------------------------------------------------------------------------------------------------------  The exam and treatment you received in the Emergency Department were for an urgent problem and are not intended as complete care. It is important that you follow-up with a doctor, nurse practitioner, or physician assistant to:  (1) confirm your diagnosis,  (2) re-evaluation of changes in your illness and treatment, and  (3) for ongoing care. Please take your discharge instructions with you when you go to your follow-up appointment. If you have any problem arranging a follow-up appointment, contact the Emergency Department. If your symptoms become worse or you do not improve as expected and you are unable to reach your health care provider, please return to the Emergency Department. We are available 24 hours a day. If a prescription has been provided, please have it filled as soon as possible to prevent a delay in treatment.  If you have any questions or reservations about taking the medication due to side effects or interactions with other medications, please call your primary care provider or contact the ER.
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
## 2022-07-20 NOTE — ED NOTES
Shon Martinez with intake spoke with pt for over an hour. Pt is not h.i. or s.i. but is hyperverbal and hyperfocused on specific issues. Pt still does not want an admission in b.h.

## 2022-08-23 ENCOUNTER — TELEPHONE (OUTPATIENT)
Dept: OBGYN CLINIC | Age: 42
End: 2022-08-23
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
## 2022-08-24 ENCOUNTER — INITIAL PRENATAL (OUTPATIENT)
Dept: OBGYN CLINIC | Age: 42
End: 2022-08-24
Payer: MEDICARE

## 2022-08-24 VITALS
SYSTOLIC BLOOD PRESSURE: 122 MMHG | WEIGHT: 180 LBS | DIASTOLIC BLOOD PRESSURE: 72 MMHG | HEIGHT: 65 IN | BODY MASS INDEX: 29.99 KG/M2

## 2022-08-24 DIAGNOSIS — Z34.81 PRENATAL CARE, SUBSEQUENT PREGNANCY, FIRST TRIMESTER: ICD-10-CM

## 2022-08-24 DIAGNOSIS — Z11.3 SCREENING EXAMINATION FOR VENEREAL DISEASE: ICD-10-CM

## 2022-08-24 DIAGNOSIS — Z34.82 PRENATAL CARE, SUBSEQUENT PREGNANCY, SECOND TRIMESTER: Primary | ICD-10-CM

## 2022-08-24 DIAGNOSIS — O09.522 MULTIGRAVIDA OF ADVANCED MATERNAL AGE IN SECOND TRIMESTER: ICD-10-CM

## 2022-08-24 DIAGNOSIS — O12.02 EDEMA DURING PREGNANCY IN SECOND TRIMESTER: ICD-10-CM

## 2022-08-24 PROBLEM — Z32.01 POSITIVE BLOOD PREGNANCY TEST: Status: RESOLVED | Noted: 2021-12-14 | Resolved: 2022-08-24

## 2022-08-24 PROCEDURE — 0501F PRENATAL FLOW SHEET: CPT | Performed by: OBSTETRICS & GYNECOLOGY
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
## 2022-08-24 NOTE — PROGRESS NOTES
Chief Complaint   Patient presents with    Initial Prenatal Visit     Visit Vitals  /72 (BP 1 Location: Left upper arm, BP Patient Position: Sitting, BP Cuff Size: Large adult)   Ht 5' 5\" (1.651 m)   Wt 180 lb (81.6 kg)   LMP 03/07/2022 (Exact Date)   BMI 29.95 kg/m²     [facility] appointment 9-08-22. Headaches, 3+ edema. OB packet, tdap & flu vacc given/dwp. Plans to bottle feed. PN labs drawn today.

## 2022-08-26 LAB
ABO GROUP BLD: ABNORMAL
BASOPHILS # BLD AUTO: 0 X10E3/UL (ref 0–0.2)
BASOPHILS NFR BLD AUTO: 0 %
BLD GP AB SCN SERPL QL: NEGATIVE
EOSINOPHIL # BLD AUTO: 0.2 X10E3/UL (ref 0–0.4)
EOSINOPHIL NFR BLD AUTO: 1 %
ERYTHROCYTE [DISTWIDTH] IN BLOOD BY AUTOMATED COUNT: 13.9 % (ref 11.7–15.4)
HBV SURFACE AG SERPL QL IA: NEGATIVE
HCT VFR BLD AUTO: 34.8 % (ref 34–46.6)
HCV AB S/CO SERPL IA: <0.1 S/CO RATIO (ref 0–0.9)
HCV AB SERPL QL IA: NORMAL
HGB BLD-MCNC: 11.3 G/DL (ref 11.1–15.9)
HIV 1+2 AB+HIV1 P24 AG SERPL QL IA: NON REACTIVE
IMM GRANULOCYTES # BLD AUTO: 0.2 X10E3/UL (ref 0–0.1)
IMM GRANULOCYTES NFR BLD AUTO: 1 %
LYMPHOCYTES # BLD AUTO: 1.3 X10E3/UL (ref 0.7–3.1)
LYMPHOCYTES NFR BLD AUTO: 7 %
MCH RBC QN AUTO: 30.1 PG (ref 26.6–33)
MCHC RBC AUTO-ENTMCNC: 32.5 G/DL (ref 31.5–35.7)
MCV RBC AUTO: 93 FL (ref 79–97)
MONOCYTES # BLD AUTO: 0.9 X10E3/UL (ref 0.1–0.9)
MONOCYTES NFR BLD AUTO: 4 %
NEUTROPHILS # BLD AUTO: 17.1 X10E3/UL (ref 1.4–7)
NEUTROPHILS NFR BLD AUTO: 87 %
PLATELET # BLD AUTO: 374 X10E3/UL (ref 150–450)
RBC # BLD AUTO: 3.75 X10E6/UL (ref 3.77–5.28)
RH BLD: POSITIVE
RPR SER QL: NON REACTIVE
RUBV IGG SERPL IA-ACNC: 4.78 INDEX
WBC # BLD AUTO: 19.7 X10E3/UL (ref 3.4–10.8)

## 2022-08-29 NOTE — PROGRESS NOTES
Prenatal panel results  Elevated WBC count historically- but stable  D/w Dr. Zohra Crockett  Will do some other labs at next visit

## 2022-09-08 ENCOUNTER — HOSPITAL ENCOUNTER (OUTPATIENT)
Dept: PERINATAL CARE | Age: 42
Discharge: HOME | End: 2022-09-08
Attending: OBSTETRICS & GYNECOLOGY
Payer: MEDICAID

## 2022-09-08 PROCEDURE — 76811 OB US DETAILED SNGL FETUS: CPT | Performed by: OBSTETRICS & GYNECOLOGY

## 2022-09-09 RX ORDER — IBUPROFEN 200 MG
1 TABLET ORAL EVERY 24 HOURS
Qty: 28 PATCH | Refills: 2 | Status: SHIPPED | OUTPATIENT
Start: 2022-09-09 | End: 2022-10-10

## 2022-09-22 ENCOUNTER — ROUTINE PRENATAL (OUTPATIENT)
Dept: OBGYN CLINIC | Age: 42
End: 2022-09-22
Payer: MEDICARE

## 2022-09-22 VITALS — HEIGHT: 65 IN | DIASTOLIC BLOOD PRESSURE: 70 MMHG | BODY MASS INDEX: 29.95 KG/M2 | SYSTOLIC BLOOD PRESSURE: 120 MMHG

## 2022-09-22 DIAGNOSIS — O12.03 GESTATIONAL EDEMA IN THIRD TRIMESTER: Primary | ICD-10-CM

## 2022-09-22 DIAGNOSIS — O09.523 MULTIGRAVIDA OF ADVANCED MATERNAL AGE IN THIRD TRIMESTER: ICD-10-CM

## 2022-09-22 DIAGNOSIS — Z3A.28 28 WEEKS GESTATION OF PREGNANCY: ICD-10-CM

## 2022-09-22 PROCEDURE — 0502F SUBSEQUENT PRENATAL CARE: CPT | Performed by: OBSTETRICS & GYNECOLOGY

## 2022-09-22 RX ORDER — HYDROCHLOROTHIAZIDE 12.5 MG/1
12.5 TABLET ORAL DAILY
Qty: 90 TABLET | Refills: 1 | Status: SHIPPED | OUTPATIENT
Start: 2022-09-22

## 2022-09-22 NOTE — PROGRESS NOTES
Questions addressed  C/o LE edema, denies any HA or visual changes  Having more lower back pain/Hip pain  Get labs- CBC/CMP today  HCTZ 12.5 mg every day for edema  Needs GS/Hgb was to get today, but has transportation issues

## 2022-09-22 NOTE — PROGRESS NOTES
Chief Complaint   Patient presents with    Routine Prenatal Visit     Visit Vitals  /70 (BP 1 Location: Right arm, BP Patient Position: Sitting, BP Cuff Size: Small adult)   Ht 5' 5\" (1.651 m)   LMP 03/07/2022 (Exact Date)   BMI 29.95 kg/m²     3+ edema, nausea, feels weak

## 2022-09-23 LAB
ALBUMIN SERPL-MCNC: 3.6 G/DL (ref 3.8–4.8)
ALBUMIN/GLOB SERPL: 1.5 {RATIO} (ref 1.2–2.2)
ALP SERPL-CCNC: 168 IU/L (ref 44–121)
ALT SERPL-CCNC: 7 IU/L (ref 0–32)
AST SERPL-CCNC: 8 IU/L (ref 0–40)
BASOPHILS # BLD AUTO: 0.1 X10E3/UL (ref 0–0.2)
BASOPHILS NFR BLD AUTO: 1 %
BILIRUB SERPL-MCNC: <0.2 MG/DL (ref 0–1.2)
BUN SERPL-MCNC: 6 MG/DL (ref 6–24)
BUN/CREAT SERPL: 16 (ref 9–23)
CALCIUM SERPL-MCNC: 9.1 MG/DL (ref 8.7–10.2)
CHLORIDE SERPL-SCNC: 101 MMOL/L (ref 96–106)
CO2 SERPL-SCNC: 19 MMOL/L (ref 20–29)
CREAT SERPL-MCNC: 0.37 MG/DL (ref 0.57–1)
EGFR: 130 ML/MIN/1.73
EOSINOPHIL # BLD AUTO: 0.1 X10E3/UL (ref 0–0.4)
EOSINOPHIL NFR BLD AUTO: 1 %
ERYTHROCYTE [DISTWIDTH] IN BLOOD BY AUTOMATED COUNT: 13 % (ref 11.7–15.4)
GLOBULIN SER CALC-MCNC: 2.4 G/DL (ref 1.5–4.5)
GLUCOSE SERPL-MCNC: 98 MG/DL (ref 65–99)
HCT VFR BLD AUTO: 31.8 % (ref 34–46.6)
HGB BLD-MCNC: 11 G/DL (ref 11.1–15.9)
IMM GRANULOCYTES # BLD AUTO: 1 X10E3/UL (ref 0–0.1)
IMM GRANULOCYTES NFR BLD AUTO: 5 %
LYMPHOCYTES # BLD AUTO: 1.2 X10E3/UL (ref 0.7–3.1)
LYMPHOCYTES NFR BLD AUTO: 6 %
MCH RBC QN AUTO: 31.3 PG (ref 26.6–33)
MCHC RBC AUTO-ENTMCNC: 34.6 G/DL (ref 31.5–35.7)
MCV RBC AUTO: 91 FL (ref 79–97)
MONOCYTES # BLD AUTO: 1.1 X10E3/UL (ref 0.1–0.9)
MONOCYTES NFR BLD AUTO: 5 %
NEUTROPHILS # BLD AUTO: 17.5 X10E3/UL (ref 1.4–7)
NEUTROPHILS NFR BLD AUTO: 82 %
PLATELET # BLD AUTO: 400 X10E3/UL (ref 150–450)
POTASSIUM SERPL-SCNC: 4.6 MMOL/L (ref 3.5–5.2)
PROT SERPL-MCNC: 6 G/DL (ref 6–8.5)
RBC # BLD AUTO: 3.51 X10E6/UL (ref 3.77–5.28)
SODIUM SERPL-SCNC: 136 MMOL/L (ref 134–144)
WBC # BLD AUTO: 21.1 X10E3/UL (ref 3.4–10.8)

## 2022-10-10 ENCOUNTER — ROUTINE PRENATAL (OUTPATIENT)
Dept: OBGYN CLINIC | Age: 42
End: 2022-10-10
Payer: MEDICARE

## 2022-10-10 VITALS
BODY MASS INDEX: 39.07 KG/M2 | WEIGHT: 199 LBS | HEIGHT: 60 IN | DIASTOLIC BLOOD PRESSURE: 70 MMHG | SYSTOLIC BLOOD PRESSURE: 120 MMHG

## 2022-10-10 DIAGNOSIS — O99.013 ANEMIA IN PREGNANCY, THIRD TRIMESTER: Primary | ICD-10-CM

## 2022-10-10 DIAGNOSIS — Z13.1 SCREENING FOR DIABETES MELLITUS: ICD-10-CM

## 2022-10-10 PROCEDURE — 0502F SUBSEQUENT PRENATAL CARE: CPT | Performed by: OBSTETRICS & GYNECOLOGY

## 2022-10-10 NOTE — PROGRESS NOTES
Questions addressed  Flu vaccine DWP  Difficult home situation- DWP that she needs to try to help herself and to have a plan going forward  Missed f/up US appt- DWP she needs to reschedule

## 2022-10-10 NOTE — PROGRESS NOTES
Chief Complaint   Patient presents with    Routine Prenatal Visit     Visit Vitals  /70 (BP 1 Location: Right arm, BP Patient Position: Sitting, BP Cuff Size: Small adult)   Ht 5' (1.524 m)   Wt 199 lb (90.3 kg)   LMP 03/07/2022 (Exact Date)   BMI 38.86 kg/m²     Headaches, 1+ edema, constipation

## 2022-11-14 ENCOUNTER — HOSPITAL ENCOUNTER (OUTPATIENT)
Age: 42
Discharge: HOME | End: 2022-11-14
Attending: OBSTETRICS & GYNECOLOGY | Admitting: OBSTETRICS & GYNECOLOGY
Payer: MEDICARE

## 2022-11-14 VITALS
SYSTOLIC BLOOD PRESSURE: 107 MMHG | TEMPERATURE: 98.4 F | OXYGEN SATURATION: 98 % | RESPIRATION RATE: 20 BRPM | HEART RATE: 101 BPM | HEIGHT: 65 IN | WEIGHT: 190 LBS | DIASTOLIC BLOOD PRESSURE: 75 MMHG | BODY MASS INDEX: 31.65 KG/M2

## 2022-11-14 DIAGNOSIS — N30.00 ACUTE CYSTITIS WITHOUT HEMATURIA: Primary | ICD-10-CM

## 2022-11-14 PROBLEM — O26.893 ABDOMINAL PAIN IN PREGNANCY, THIRD TRIMESTER: Status: ACTIVE | Noted: 2022-11-14

## 2022-11-14 PROBLEM — Z3A.35 35 WEEKS GESTATION OF PREGNANCY: Status: ACTIVE | Noted: 2022-11-14

## 2022-11-14 PROBLEM — R10.9 ABDOMINAL PAIN IN PREGNANCY, THIRD TRIMESTER: Status: ACTIVE | Noted: 2022-11-14

## 2022-11-14 LAB
AMPHET UR QL SCN: POSITIVE
APPEARANCE UR: ABNORMAL
BACTERIA URNS QL MICRO: ABNORMAL /HPF
BARBITURATES UR QL SCN: NEGATIVE
BENZODIAZ UR QL: NEGATIVE
BILIRUB UR QL: NEGATIVE
CANNABINOIDS UR QL SCN: POSITIVE
COCAINE UR QL SCN: NEGATIVE
COLOR UR: ABNORMAL
DRUG SCRN COMMENT,DRGCM: ABNORMAL
GLUCOSE UR STRIP.AUTO-MCNC: NEGATIVE MG/DL
HGB UR QL STRIP: NEGATIVE
KETONES UR QL STRIP.AUTO: NEGATIVE MG/DL
LEUKOCYTE ESTERASE UR QL STRIP.AUTO: ABNORMAL
METHADONE UR QL: NEGATIVE
MUCOUS THREADS URNS QL MICRO: ABNORMAL /LPF
NITRITE UR QL STRIP.AUTO: POSITIVE
OPIATES UR QL: NEGATIVE
PCP UR QL: NEGATIVE
PH UR STRIP: 6 [PH] (ref 5–8)
PROT UR STRIP-MCNC: 30 MG/DL
RBC #/AREA URNS HPF: ABNORMAL /HPF (ref 0–5)
SP GR UR REFRACTOMETRY: 1.02 (ref 1–1.03)
UA: UC IF INDICATED,UAUC: ABNORMAL
UROBILINOGEN UR QL STRIP.AUTO: 0.1 EU/DL (ref 0.1–1)
WBC URNS QL MICRO: >100 /HPF (ref 0–4)

## 2022-11-14 PROCEDURE — 74011250637 HC RX REV CODE- 250/637: Performed by: OBSTETRICS & GYNECOLOGY

## 2022-11-14 PROCEDURE — 96361 HYDRATE IV INFUSION ADD-ON: CPT

## 2022-11-14 PROCEDURE — 80307 DRUG TEST PRSMV CHEM ANLYZR: CPT

## 2022-11-14 PROCEDURE — 96360 HYDRATION IV INFUSION INIT: CPT

## 2022-11-14 PROCEDURE — 74011250636 HC RX REV CODE- 250/636: Performed by: OBSTETRICS & GYNECOLOGY

## 2022-11-14 PROCEDURE — 99213 OFFICE O/P EST LOW 20 MIN: CPT | Performed by: OBSTETRICS & GYNECOLOGY

## 2022-11-14 PROCEDURE — 81001 URINALYSIS AUTO W/SCOPE: CPT

## 2022-11-14 PROCEDURE — 87086 URINE CULTURE/COLONY COUNT: CPT

## 2022-11-14 PROCEDURE — 87186 SC STD MICRODIL/AGAR DIL: CPT

## 2022-11-14 PROCEDURE — 87077 CULTURE AEROBIC IDENTIFY: CPT

## 2022-11-14 PROCEDURE — 99284 EMERGENCY DEPT VISIT MOD MDM: CPT

## 2022-11-14 RX ORDER — AMOXICILLIN AND CLAVULANATE POTASSIUM 875; 125 MG/1; MG/1
1 TABLET, FILM COATED ORAL 2 TIMES DAILY
Qty: 14 TABLET | Refills: 0 | Status: SHIPPED | OUTPATIENT
Start: 2022-11-14 | End: 2022-11-18 | Stop reason: ALTCHOICE

## 2022-11-14 RX ORDER — GRANULES FOR ORAL 3 G/1
3 POWDER ORAL ONCE
Status: COMPLETED | OUTPATIENT
Start: 2022-11-14 | End: 2022-11-14

## 2022-11-14 RX ORDER — SODIUM CHLORIDE, SODIUM LACTATE, POTASSIUM CHLORIDE, CALCIUM CHLORIDE 600; 310; 30; 20 MG/100ML; MG/100ML; MG/100ML; MG/100ML
1000 INJECTION, SOLUTION INTRAVENOUS CONTINUOUS
Status: DISCONTINUED | OUTPATIENT
Start: 2022-11-14 | End: 2022-11-14

## 2022-11-14 RX ORDER — SODIUM CHLORIDE, SODIUM LACTATE, POTASSIUM CHLORIDE, CALCIUM CHLORIDE 600; 310; 30; 20 MG/100ML; MG/100ML; MG/100ML; MG/100ML
1000 INJECTION, SOLUTION INTRAVENOUS ONCE
Status: COMPLETED | OUTPATIENT
Start: 2022-11-14 | End: 2022-11-14

## 2022-11-14 RX ORDER — DOCUSATE SODIUM 100 MG/1
100 CAPSULE, LIQUID FILLED ORAL
Qty: 60 CAPSULE | Refills: 0 | Status: ON HOLD | OUTPATIENT
Start: 2022-11-14

## 2022-11-14 RX ORDER — POLYETHYLENE GLYCOL 3350 17 G/17G
17 POWDER, FOR SOLUTION ORAL
Status: DISCONTINUED | OUTPATIENT
Start: 2022-11-14 | End: 2022-11-14 | Stop reason: HOSPADM

## 2022-11-14 RX ADMIN — SODIUM CHLORIDE, POTASSIUM CHLORIDE, SODIUM LACTATE AND CALCIUM CHLORIDE 1000 ML: 600; 310; 30; 20 INJECTION, SOLUTION INTRAVENOUS at 02:08

## 2022-11-14 RX ADMIN — SODIUM CHLORIDE, POTASSIUM CHLORIDE, SODIUM LACTATE AND CALCIUM CHLORIDE 1000 ML: 600; 310; 30; 20 INJECTION, SOLUTION INTRAVENOUS at 01:20

## 2022-11-14 RX ADMIN — POLYETHYLENE GLYCOL 3350 17 G: 17 POWDER, FOR SOLUTION ORAL at 03:10

## 2022-11-14 RX ADMIN — GRANULES FOR ORAL SOLUTION 3 G: 3 POWDER ORAL at 03:49
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
## 2022-11-14 NOTE — PROGRESS NOTES
0021 - Pt of , 35 weeks and 6 days arrives in unit via stretcher accompanied by squad complains of abdominal pain and constipation. Endorses fetal movements, denies leaking of fluid and denies vaginal bleeding. Pt asked to change into a gown and leave a urine sample. 65 - Dr. Neha Barajas informed about the pt's status by LUCAS Tate - SVE done as: 0    0157 - MD at bedside to explain plan of care for pt. Pt verbalized understanding. 9585 - Pt asked to click the button for NST as ordered by MD; Jace Ramsey showed pt how to use it.    0225 - Pt refused to use the NST button and stated \"How will I be able to sleep if I have to click this? The doctor told me I can sleep. \"     Zenon Kocher told MD regarding the pt's concern about the NST button and MD agreed to remove it as per pt. Zenon Kocher called MD and informed her about the urinalysis and drug screen result. MD ordered Fosfomycin 1 dose and urine culture. MD also ordered to check pt's cervix after the second bag of fluid and if cervix remained unchanged pt can be discharged. 0310 - Pt told this writer \"I don't have anyone to come pick me up. \" Pt began crying and stated \"I just have a lot going on in my life. \" RN used therapeutic communication and reassured pt.    0 - Writer informed LUCAS Hunter about the pt's concern of transport after the observation. Cab transport request ready. 176 - Writer informed pt about the cab transport that the [facility] offer to help her but pt wished to stay instead until this morning so she could sleep and rest.    1394 - SVE done as: 0. Cvx remained unchanged. 56 - Writer in room to place fetal monitor back to pt. Pt informed of MD's plan of discharging [address] at [address] RN placed a call for the cab transport. 2116 - Discharge instructions discussed with the patient including the importance of fetal kick counts, pregnancy precautions, and when to call.  Home care instructions in regard to UTI also included. Pt also advised to drop by her pharmacy to retrieve the medications ordered by Dr. Ronen Fajardo for her bladder infection and constipation. Pt. Verbalized understanding of these instructions. 9964 - Discharge papers signed and given to pt.    6346 - Cab transport arrived.    4785 - Pt wheeled off to ED accompanied by RN x2 together with her personal belongings. Writer reiterated to  her medications at her pharmacy. 2001 - Pt transported safely to the Medicaid cab. Cab transport request form given to the .

## 2022-11-16 ENCOUNTER — TELEPHONE (OUTPATIENT)
Dept: OBGYN CLINIC | Age: 42
End: 2022-11-16

## 2022-11-16 LAB
BACTERIA SPEC CULT: ABNORMAL
COLONY COUNT,CNT: ABNORMAL
SPECIAL REQUESTS,SREQ: ABNORMAL

## 2022-11-16 NOTE — TELEPHONE ENCOUNTER
Patient called c/o swelling, constant pain and feeling as if she has started to dilate. States she has pressure and feels like the baby is going to drop out. She is also c/o feeling short of breath. Per the patient, she has transportation issues that Dr Dorothea Taylor is aware of. She was seen in L&D on 11/14/22 and diagnosed with a UTI, constipation and given IV fluids. States she is very uncomfortable. Advised her to go to L&D for evaluation and she states she would like a return call from Dr Dorothea Taylor. Message forwarded to him.

## 2022-11-16 NOTE — PROGRESS NOTES
pls let her know urine culture is not finalized yet but looks like bladder infection. Antibiotics was sent when she was on l&d. We will f/up on final results.

## 2022-11-17 ENCOUNTER — TELEPHONE (OUTPATIENT)
Dept: OBGYN CLINIC | Age: 42
End: 2022-11-17

## 2022-11-18 DIAGNOSIS — B96.20 E. COLI UTI: Primary | ICD-10-CM

## 2022-11-18 DIAGNOSIS — N39.0 E. COLI UTI: Primary | ICD-10-CM

## 2022-11-18 RX ORDER — CEPHALEXIN 500 MG/1
500 CAPSULE ORAL EVERY 6 HOURS
Qty: 20 CAPSULE | Refills: 0 | Status: SHIPPED | OUTPATIENT
Start: 2022-11-18 | End: 2022-11-22 | Stop reason: ALTCHOICE

## 2022-11-18 NOTE — PROGRESS NOTES
PORTAL MESSAGE  Hi Taylor,     The urine culture shows a bladder infection. The antibiotics we sent from the hospital will not treat this particular infection. I will send in a new prescription for cephalexin to the pharmacy. Please discontinue the Augmentin and start the new one.     Thank you,  Dr. Onesimo Nath.

## 2022-11-21 ENCOUNTER — TELEPHONE (OUTPATIENT)
Dept: OBGYN CLINIC | Age: 42
End: 2022-11-21

## 2022-11-21 DIAGNOSIS — O99.343 DEPRESSION AFFECTING PREGNANCY IN THIRD TRIMESTER, ANTEPARTUM: Primary | ICD-10-CM

## 2022-11-21 DIAGNOSIS — F32.A DEPRESSION AFFECTING PREGNANCY IN THIRD TRIMESTER, ANTEPARTUM: Primary | ICD-10-CM

## 2022-11-21 NOTE — TELEPHONE ENCOUNTER
Patient called stating she needs an appointment with Dr Sameera Ibanez. Appointment scheduled on 11/22/22. She is also stating her prescription for Roel Lavell is not at her pharmacy. Patient's prescription was submitted to [facility] on [address] and she states it was supposed to be submitted to [facility] on [address].   Prescription cancelled at [facility] and submitted to [facility].

## 2022-11-22 ENCOUNTER — ROUTINE PRENATAL (OUTPATIENT)
Dept: OBGYN CLINIC | Age: 42
End: 2022-11-22
Payer: MEDICARE

## 2022-11-22 VITALS — BODY MASS INDEX: 34.49 KG/M2 | WEIGHT: 207 LBS | HEIGHT: 65 IN

## 2022-11-22 DIAGNOSIS — Z36.85 ANTENATAL SCREENING FOR STREPTOCOCCUS B: ICD-10-CM

## 2022-11-22 DIAGNOSIS — Z3A.37 37 WEEKS GESTATION OF PREGNANCY: ICD-10-CM

## 2022-11-22 DIAGNOSIS — N89.8 VAGINAL DISCHARGE: Primary | ICD-10-CM

## 2022-11-22 DIAGNOSIS — F19.10 POLYSUBSTANCE ABUSE (HCC): ICD-10-CM

## 2022-11-22 DIAGNOSIS — O09.523 MULTIGRAVIDA OF ADVANCED MATERNAL AGE IN THIRD TRIMESTER: ICD-10-CM

## 2022-11-22 PROCEDURE — 0502F SUBSEQUENT PRENATAL CARE: CPT | Performed by: OBSTETRICS & GYNECOLOGY

## 2022-11-25 LAB
A VAGINAE DNA VAG QL NAA+PROBE: ABNORMAL SCORE
BVAB2 DNA VAG QL NAA+PROBE: ABNORMAL SCORE
C ALBICANS DNA VAG QL NAA+PROBE: POSITIVE
C GLABRATA DNA VAG QL NAA+PROBE: POSITIVE
C KRUSEI DNA VAG QL NAA+PROBE: NEGATIVE
C LUSITANIAE DNA VAG QL NAA+PROBE: NEGATIVE
C TRACH DNA VAG QL NAA+PROBE: NEGATIVE
CANDIDA DNA VAG QL NAA+PROBE: NEGATIVE
MEGA1 DNA VAG QL NAA+PROBE: ABNORMAL SCORE
N GONORRHOEA DNA VAG QL NAA+PROBE: NEGATIVE
T VAGINALIS DNA VAG QL NAA+PROBE: POSITIVE

## 2022-11-29 ENCOUNTER — DOCUMENTATION ONLY (OUTPATIENT)
Dept: OBGYN CLINIC | Age: 42
End: 2022-11-29

## 2022-11-29 NOTE — PROGRESS NOTES
Pt called notes decreased fetal movement, is in a difficult situation at home with significant other, has transportation issues as well    Given hx and dec.  FM- proceed with IOL in AM  Verbal consent over the phone obtained

## 2022-11-30 ENCOUNTER — HOSPITAL ENCOUNTER (INPATIENT)
Age: 42
LOS: 2 days | Discharge: OTHER INSTITUTION | End: 2022-12-02
Attending: OBSTETRICS & GYNECOLOGY | Admitting: OBSTETRICS & GYNECOLOGY
Payer: MEDICARE

## 2022-11-30 ENCOUNTER — ANESTHESIA (OUTPATIENT)
Dept: LABOR AND DELIVERY | Age: 42
End: 2022-11-30
Payer: MEDICARE

## 2022-11-30 ENCOUNTER — ANESTHESIA EVENT (OUTPATIENT)
Dept: LABOR AND DELIVERY | Age: 42
End: 2022-11-30
Payer: MEDICARE

## 2022-11-30 PROBLEM — Z34.90 PREGNANCY: Status: ACTIVE | Noted: 2022-11-30

## 2022-11-30 LAB
ABO + RH BLD: NORMAL
ALBUMIN SERPL-MCNC: 2 G/DL (ref 3.5–5)
ALBUMIN/GLOB SERPL: 0.5 {RATIO} (ref 1.1–2.2)
ALP SERPL-CCNC: 218 U/L (ref 45–117)
ALT SERPL-CCNC: 15 U/L (ref 12–78)
AMPHET UR QL SCN: POSITIVE
ANION GAP SERPL CALC-SCNC: 10 MMOL/L (ref 5–15)
AST SERPL W P-5'-P-CCNC: 5 U/L (ref 15–37)
BARBITURATES UR QL SCN: NEGATIVE
BASOPHILS # BLD: 0.1 K/UL (ref 0–0.1)
BASOPHILS NFR BLD: 0 % (ref 0–1)
BENZODIAZ UR QL: NEGATIVE
BILIRUB SERPL-MCNC: 0.3 MG/DL (ref 0.2–1)
BLOOD GROUP ANTIBODIES SERPL: NEGATIVE
BUN SERPL-MCNC: 10 MG/DL (ref 6–20)
BUN/CREAT SERPL: 16 (ref 12–20)
CA-I BLD-MCNC: 8.6 MG/DL (ref 8.5–10.1)
CANNABINOIDS UR QL SCN: NEGATIVE
CHLORIDE SERPL-SCNC: 106 MMOL/L (ref 97–108)
CO2 SERPL-SCNC: 20 MMOL/L (ref 21–32)
COCAINE UR QL SCN: NEGATIVE
CREAT SERPL-MCNC: 0.61 MG/DL (ref 0.55–1.02)
DIFFERENTIAL METHOD BLD: ABNORMAL
DRUG SCRN COMMENT,DRGCM: ABNORMAL
EOSINOPHIL # BLD: 0.1 K/UL (ref 0–0.4)
EOSINOPHIL NFR BLD: 1 % (ref 0–7)
ERYTHROCYTE [DISTWIDTH] IN BLOOD BY AUTOMATED COUNT: 14.7 % (ref 11.5–14.5)
GLOBULIN SER CALC-MCNC: 4.3 G/DL (ref 2–4)
GLUCOSE SERPL-MCNC: 106 MG/DL (ref 65–100)
HCT VFR BLD AUTO: 28.8 % (ref 35–47)
HGB BLD-MCNC: 9.1 G/DL (ref 11.5–16)
IMM GRANULOCYTES # BLD AUTO: 0.3 K/UL (ref 0–0.04)
IMM GRANULOCYTES NFR BLD AUTO: 2 % (ref 0–0.5)
LYMPHOCYTES # BLD: 1.5 K/UL (ref 0.8–3.5)
LYMPHOCYTES NFR BLD: 9 % (ref 12–49)
MCH RBC QN AUTO: 27.2 PG (ref 26–34)
MCHC RBC AUTO-ENTMCNC: 31.6 G/DL (ref 30–36.5)
MCV RBC AUTO: 86.2 FL (ref 80–99)
METHADONE UR QL: NEGATIVE
MONOCYTES # BLD: 1 K/UL (ref 0–1)
MONOCYTES NFR BLD: 6 % (ref 5–13)
NEUTS SEG # BLD: 13.6 K/UL (ref 1.8–8)
NEUTS SEG NFR BLD: 82 % (ref 32–75)
NRBC # BLD: 0.05 K/UL (ref 0–0.01)
NRBC BLD-RTO: 0.3 PER 100 WBC
OPIATES UR QL: NEGATIVE
PCP UR QL: NEGATIVE
PLATELET # BLD AUTO: 395 K/UL (ref 150–400)
PMV BLD AUTO: 10.4 FL (ref 8.9–12.9)
POTASSIUM SERPL-SCNC: 3.8 MMOL/L (ref 3.5–5.1)
PROT SERPL-MCNC: 6.3 G/DL (ref 6.4–8.2)
RBC # BLD AUTO: 3.34 M/UL (ref 3.8–5.2)
SODIUM SERPL-SCNC: 136 MMOL/L (ref 136–145)
SPECIMEN EXP DATE BLD: NORMAL
WBC # BLD AUTO: 16.5 K/UL (ref 3.6–11)

## 2022-11-30 PROCEDURE — 74011000250 HC RX REV CODE- 250: Performed by: ANESTHESIOLOGY

## 2022-11-30 PROCEDURE — 75410000000 HC DELIVERY VAGINAL/SINGLE

## 2022-11-30 PROCEDURE — 75410000003 HC RECOV DEL/VAG/CSECN EA 0.5 HR

## 2022-11-30 PROCEDURE — 75410000002 HC LABOR FEE PER 1 HR

## 2022-11-30 PROCEDURE — 80053 COMPREHEN METABOLIC PANEL: CPT

## 2022-11-30 PROCEDURE — 65410000002 HC RM PRIVATE OB

## 2022-11-30 PROCEDURE — 3E0S3BZ INTRODUCTION OF ANESTHETIC AGENT INTO EPIDURAL SPACE, PERCUTANEOUS APPROACH: ICD-10-PCS | Performed by: ANESTHESIOLOGY

## 2022-11-30 PROCEDURE — 86900 BLOOD TYPING SEROLOGIC ABO: CPT

## 2022-11-30 PROCEDURE — 85025 COMPLETE CBC W/AUTO DIFF WBC: CPT

## 2022-11-30 PROCEDURE — 74011250637 HC RX REV CODE- 250/637: Performed by: OBSTETRICS & GYNECOLOGY

## 2022-11-30 PROCEDURE — 76060000078 HC EPIDURAL ANESTHESIA

## 2022-11-30 PROCEDURE — 74011250636 HC RX REV CODE- 250/636: Performed by: OBSTETRICS & GYNECOLOGY

## 2022-11-30 PROCEDURE — 80307 DRUG TEST PRSMV CHEM ANLYZR: CPT

## 2022-11-30 PROCEDURE — 74011250636 HC RX REV CODE- 250/636

## 2022-11-30 PROCEDURE — 74011000258 HC RX REV CODE- 258: Performed by: OBSTETRICS & GYNECOLOGY

## 2022-11-30 RX ORDER — IBUPROFEN 800 MG/1
800 TABLET ORAL
Qty: 30 TABLET | Refills: 0 | Status: SHIPPED | OUTPATIENT
Start: 2022-11-30 | End: 2022-12-05

## 2022-11-30 RX ORDER — ONDANSETRON 2 MG/ML
4 INJECTION INTRAMUSCULAR; INTRAVENOUS
Status: DISCONTINUED | OUTPATIENT
Start: 2022-11-30 | End: 2022-11-30

## 2022-11-30 RX ORDER — BISACODYL 5 MG
5 TABLET, DELAYED RELEASE (ENTERIC COATED) ORAL DAILY PRN
Status: DISCONTINUED | OUTPATIENT
Start: 2022-11-30 | End: 2022-12-02 | Stop reason: HOSPADM

## 2022-11-30 RX ORDER — METRONIDAZOLE 500 MG/100ML
500 INJECTION, SOLUTION INTRAVENOUS EVERY 8 HOURS
Status: COMPLETED | OUTPATIENT
Start: 2022-11-30 | End: 2022-11-30

## 2022-11-30 RX ORDER — OXYTOCIN/RINGER'S LACTATE 30/500 ML
87.3 PLASTIC BAG, INJECTION (ML) INTRAVENOUS AS NEEDED
Status: COMPLETED | OUTPATIENT
Start: 2022-11-30 | End: 2022-11-30

## 2022-11-30 RX ORDER — SODIUM CHLORIDE 0.9 % (FLUSH) 0.9 %
5-40 SYRINGE (ML) INJECTION EVERY 8 HOURS
Status: DISCONTINUED | OUTPATIENT
Start: 2022-11-30 | End: 2022-11-30

## 2022-11-30 RX ORDER — OXYTOCIN/RINGER'S LACTATE 30/500 ML
0-20 PLASTIC BAG, INJECTION (ML) INTRAVENOUS
Status: DISCONTINUED | OUTPATIENT
Start: 2022-11-30 | End: 2022-11-30

## 2022-11-30 RX ORDER — NALOXONE HYDROCHLORIDE 0.4 MG/ML
0.4 INJECTION, SOLUTION INTRAMUSCULAR; INTRAVENOUS; SUBCUTANEOUS AS NEEDED
Status: DISCONTINUED | OUTPATIENT
Start: 2022-11-30 | End: 2022-12-02 | Stop reason: HOSPADM

## 2022-11-30 RX ORDER — DIPHENHYDRAMINE HCL 25 MG
25 CAPSULE ORAL
Status: DISCONTINUED | OUTPATIENT
Start: 2022-11-30 | End: 2022-12-02 | Stop reason: HOSPADM

## 2022-11-30 RX ORDER — SODIUM CHLORIDE 0.9 % (FLUSH) 0.9 %
5-40 SYRINGE (ML) INJECTION AS NEEDED
Status: DISCONTINUED | OUTPATIENT
Start: 2022-11-30 | End: 2022-11-30

## 2022-11-30 RX ORDER — SODIUM CHLORIDE, SODIUM LACTATE, POTASSIUM CHLORIDE, CALCIUM CHLORIDE 600; 310; 30; 20 MG/100ML; MG/100ML; MG/100ML; MG/100ML
125 INJECTION, SOLUTION INTRAVENOUS CONTINUOUS
Status: DISCONTINUED | OUTPATIENT
Start: 2022-11-30 | End: 2022-12-02 | Stop reason: HOSPADM

## 2022-11-30 RX ORDER — LIDOCAINE HYDROCHLORIDE 20 MG/ML
INJECTION, SOLUTION EPIDURAL; INFILTRATION; INTRACAUDAL; PERINEURAL
Status: COMPLETED
Start: 2022-11-30 | End: 2022-11-30

## 2022-11-30 RX ORDER — FENTANYL/BUPIVACAINE/NS/PF 2-1250MCG
1-18 PREFILLED PUMP RESERVOIR EPIDURAL
Status: DISCONTINUED | OUTPATIENT
Start: 2022-11-30 | End: 2022-11-30

## 2022-11-30 RX ORDER — SIMETHICONE 80 MG
80 TABLET,CHEWABLE ORAL
Status: DISCONTINUED | OUTPATIENT
Start: 2022-11-30 | End: 2022-12-02 | Stop reason: HOSPADM

## 2022-11-30 RX ORDER — NALOXONE HYDROCHLORIDE 0.4 MG/ML
0.2 INJECTION, SOLUTION INTRAMUSCULAR; INTRAVENOUS; SUBCUTANEOUS
Status: DISCONTINUED | OUTPATIENT
Start: 2022-11-30 | End: 2022-11-30

## 2022-11-30 RX ORDER — OXYTOCIN/RINGER'S LACTATE 30/500 ML
87.3 PLASTIC BAG, INJECTION (ML) INTRAVENOUS AS NEEDED
Status: DISCONTINUED | OUTPATIENT
Start: 2022-11-30 | End: 2022-11-30

## 2022-11-30 RX ORDER — METRONIDAZOLE 500 MG/100ML
500 INJECTION, SOLUTION INTRAVENOUS EVERY 8 HOURS
Status: DISCONTINUED | OUTPATIENT
Start: 2022-11-30 | End: 2022-11-30

## 2022-11-30 RX ORDER — ACETAMINOPHEN 325 MG/1
650 TABLET ORAL
Status: DISCONTINUED | OUTPATIENT
Start: 2022-11-30 | End: 2022-11-30

## 2022-11-30 RX ORDER — NALOXONE HYDROCHLORIDE 0.4 MG/ML
0.4 INJECTION, SOLUTION INTRAMUSCULAR; INTRAVENOUS; SUBCUTANEOUS AS NEEDED
Status: DISCONTINUED | OUTPATIENT
Start: 2022-11-30 | End: 2022-11-30

## 2022-11-30 RX ORDER — OXYTOCIN/RINGER'S LACTATE 30/500 ML
10 PLASTIC BAG, INJECTION (ML) INTRAVENOUS AS NEEDED
Status: DISCONTINUED | OUTPATIENT
Start: 2022-11-30 | End: 2022-12-02 | Stop reason: HOSPADM

## 2022-11-30 RX ORDER — IBUPROFEN 800 MG/1
800 TABLET ORAL
Status: DISCONTINUED | OUTPATIENT
Start: 2022-11-30 | End: 2022-12-02 | Stop reason: HOSPADM

## 2022-11-30 RX ORDER — OXYTOCIN/RINGER'S LACTATE 30/500 ML
PLASTIC BAG, INJECTION (ML) INTRAVENOUS
Status: DISCONTINUED
Start: 2022-11-30 | End: 2022-11-30

## 2022-11-30 RX ORDER — OXYCODONE HYDROCHLORIDE 5 MG/1
5 TABLET ORAL
Qty: 16 TABLET | Refills: 0 | Status: SHIPPED | OUTPATIENT
Start: 2022-11-30 | End: 2022-12-05

## 2022-11-30 RX ORDER — ZOLPIDEM TARTRATE 5 MG/1
5 TABLET ORAL
Status: DISCONTINUED | OUTPATIENT
Start: 2022-11-30 | End: 2022-12-02 | Stop reason: HOSPADM

## 2022-11-30 RX ORDER — ONDANSETRON 4 MG/1
4 TABLET, ORALLY DISINTEGRATING ORAL
Status: DISCONTINUED | OUTPATIENT
Start: 2022-11-30 | End: 2022-12-02 | Stop reason: HOSPADM

## 2022-11-30 RX ORDER — HYDROCORTISONE ACETATE PRAMOXINE HCL 2.5; 1 G/100G; G/100G
CREAM TOPICAL AS NEEDED
Status: DISCONTINUED | OUTPATIENT
Start: 2022-11-30 | End: 2022-12-02 | Stop reason: HOSPADM

## 2022-11-30 RX ORDER — SODIUM CHLORIDE, SODIUM LACTATE, POTASSIUM CHLORIDE, CALCIUM CHLORIDE 600; 310; 30; 20 MG/100ML; MG/100ML; MG/100ML; MG/100ML
125 INJECTION, SOLUTION INTRAVENOUS CONTINUOUS
Status: DISCONTINUED | OUTPATIENT
Start: 2022-11-30 | End: 2022-11-30

## 2022-11-30 RX ORDER — LIDOCAINE HYDROCHLORIDE 20 MG/ML
INJECTION, SOLUTION EPIDURAL; INFILTRATION; INTRACAUDAL; PERINEURAL AS NEEDED
Status: DISCONTINUED | OUTPATIENT
Start: 2022-11-30 | End: 2022-11-30 | Stop reason: HOSPADM

## 2022-11-30 RX ORDER — NALBUPHINE HYDROCHLORIDE 10 MG/ML
5 INJECTION, SOLUTION INTRAMUSCULAR; INTRAVENOUS; SUBCUTANEOUS
Status: DISCONTINUED | OUTPATIENT
Start: 2022-11-30 | End: 2022-11-30

## 2022-11-30 RX ORDER — OXYTOCIN/RINGER'S LACTATE 30/500 ML
10 PLASTIC BAG, INJECTION (ML) INTRAVENOUS AS NEEDED
Status: DISCONTINUED | OUTPATIENT
Start: 2022-11-30 | End: 2022-11-30

## 2022-11-30 RX ADMIN — Medication 2.5 MILLION UNITS: at 14:43

## 2022-11-30 RX ADMIN — Medication 2 MILLI-UNITS/MIN: at 08:49

## 2022-11-30 RX ADMIN — ACETAMINOPHEN 650 MG: 325 TABLET ORAL at 14:42

## 2022-11-30 RX ADMIN — IBUPROFEN 800 MG: 800 TABLET, FILM COATED ORAL at 23:36

## 2022-11-30 RX ADMIN — SODIUM CHLORIDE, POTASSIUM CHLORIDE, SODIUM LACTATE AND CALCIUM CHLORIDE 125 ML/HR: 600; 310; 30; 20 INJECTION, SOLUTION INTRAVENOUS at 07:07

## 2022-11-30 RX ADMIN — SODIUM CHLORIDE 5 MILLION UNITS: 900 INJECTION INTRAVENOUS at 09:54

## 2022-11-30 RX ADMIN — METRONIDAZOLE 500 MG: 500 INJECTION, SOLUTION INTRAVENOUS at 11:09

## 2022-11-30 RX ADMIN — Medication 10 ML/HR: at 11:09

## 2022-11-30 RX ADMIN — METRONIDAZOLE 500 MG: 500 INJECTION, SOLUTION INTRAVENOUS at 19:52

## 2022-11-30 RX ADMIN — LIDOCAINE HYDROCHLORIDE 5 ML: 20 INJECTION, SOLUTION EPIDURAL; INFILTRATION; INTRACAUDAL; PERINEURAL at 14:16

## 2022-11-30 RX ADMIN — Medication 8 MILLI-UNITS/MIN: at 09:59

## 2022-11-30 RX ADMIN — Medication 87.3 MILLI-UNITS/MIN: at 16:39

## 2022-11-30 NOTE — ANESTHESIA PROCEDURE NOTES
Epidural Block    Patient location during procedure: OB  Start time: 11/30/2022 9:45 AM  End time: 11/30/2022 10:00 AM  Reason for block: labor epidural  Staffing  Performed: attending   Anesthesiologist: Leta Jeans, MD  Preanesthetic Checklist  Completed: patient identified, IV checked, site marked, risks and benefits discussed, surgical consent, monitors and equipment checked, pre-op evaluation, timeout performed and fire risk safety assessment completed and verbalized  Block Placement  Patient position: sitting  Prep: Betadine  Sterility prep: mask, hand, gown, gloves, drape and cap  Sedation level: no sedation  Patient monitoring: heart rate, frequent blood pressure checks and continuous pulse oximetry  Approach: midline  Location: lumbar  Lumbar location: L3-L4  Epidural  Loss of resistance technique: saline  Guidance: landmark technique  Needle  Needle type: Tuohy   Needle gauge: 17 G  Needle length: 10 cm  Needle insertion depth: 7 cm  Catheter type: multi-orifice  Catheter size: 19 G  Catheter at skin depth: 12 cm  Catheter securement method: clear occlusive dressing  Test dose: negative  Assessment  Block outcome: pain improved  Number of attempts: 1  Procedure assessment: patient tolerated procedure well with no immediate complications

## 2022-11-30 NOTE — PROGRESS NOTES
Delivery Procedure Note    Delivery Physician:  Hermila Moise MD    Procedure: Spontaneous vaginal delivery    Indications for instrumental delivery: none    Estimated Blood Loss: 250    Episiotomy: No    Laceration(s):  None    Laceration(s) repair: NO    Presentation: Cephalic    Fetal Position: Occiput Anterior    Apgar - One Minute: 9    Apgar - Five Minutes: 9    Umbilical Cord: Nuchal Cord x  1,loose over right shoulder,  3 vessels present, and Cord blood sent to lab for type, Rh, and Pat' test  Hermilo manuever    Placenta Removal: spontaneous    Placenta Appearance: normal    Specimens: Cord Blood           Complications:  None             Signed By:  Hermila Moise MD     2022

## 2022-11-30 NOTE — ANESTHESIA PREPROCEDURE EVALUATION
Relevant Problems   No relevant active problems       Anesthetic History   No history of anesthetic complications            Review of Systems / Medical History  Patient summary reviewed, nursing notes reviewed and pertinent labs reviewed    Pulmonary  Within defined limits                 Neuro/Psych         Headaches and psychiatric history     Cardiovascular  Within defined limits                     GI/Hepatic/Renal  Within defined limits              Endo/Other        Obesity and anemia     Other Findings            Past Medical History:   Diagnosis Date    AMA (advanced maternal age) multigravida 35+     Anxiety     Depression     Drug abuse ([facility] [address]     Postpartum depression     PTSD (post-traumatic stress disorder)     Trauma        No past surgical history on file.     Current Outpatient Medications   Medication Instructions    albuterol (PROVENTIL HFA, VENTOLIN HFA, PROAIR HFA) 90 mcg/actuation inhaler 2 Puffs, Inhalation, EVERY 6 HOURS AS NEEDED    budesonide-formoteroL (SYMBICORT) 160-4.5 mcg/actuation HFAA 2 Puffs, Inhalation, 2 TIMES DAILY AS NEEDED    docusate sodium (COLACE) 100 mg, Oral, 2 TIMES DAILY AS NEEDED    lurasidone (LATUDA) 60 mg, Oral, DAILY WITH BREAKFAST    prenatal vit no.130-iron-folic 27 mg iron- 884 mcg tab tablet 1 Tablet, Oral, DAILY       Current Facility-Administered Medications   Medication Dose Route Frequency    ondansetron (ZOFRAN) injection 4 mg  4 mg IntraVENous Q4H PRN    naloxone (NARCAN) injection 0.4 mg  0.4 mg IntraVENous PRN    lactated Ringers infusion  125 mL/hr IntraVENous CONTINUOUS    oxytocin (PITOCIN) 30 units in 500 mL infusion  0-20 lita-units/min IntraVENous TITRATE    penicillin G potassium (PFIZERPEN) 5 Million Units in 0.9% sodium chloride (MBP/ADV) 100 mL MBP  5 Million Units IntraVENous ONCE    penicillin G pot (PFIZERPEN) 2.5 Million Units in 50 ml 0.9% NaCl  2.5 Million Units IntraVENous Q4H       Patient Vitals for the past 24 hrs: Temp Pulse Resp BP SpO2   11/30/22 0900 -- 92 -- 108/73 --   11/30/22 0831 -- 94 -- 115/73 --   11/30/22 0813 -- 95 -- 119/72 --   11/30/22 0751 -- -- -- -- (!) 84 %   11/30/22 0750 -- -- -- -- (!) 84 %   11/30/22 0735 -- -- -- -- 100 %   11/30/22 0734 -- -- -- -- 100 %   11/30/22 0730 -- (!) 101 -- 129/76 --   11/30/22 0729 -- -- -- -- 100 %   11/30/22 0724 -- -- -- -- 100 %   11/30/22 0719 -- -- -- -- 100 %   11/30/22 0715 36.7 °C (98 °F) 94 -- 129/84 --   11/30/22 0714 -- -- -- -- 100 %   11/30/22 0709 -- -- -- -- 99 %   11/30/22 0704 -- -- -- -- 100 %   11/30/22 0700 -- 96 -- 139/84 --   11/30/22 0659 -- -- -- -- 100 %   11/30/22 0654 -- -- -- -- 100 %   11/30/22 0649 -- -- -- -- 100 %   11/30/22 0645 36.5 °C (97.7 °F) (!) 105 22 114/61 --       Lab Results   Component Value Date/Time    WBC 16.5 (H) 11/30/2022 06:40 AM    HGB 9.1 (L) 11/30/2022 06:40 AM    HCT 28.8 (L) 11/30/2022 06:40 AM    PLATELET 996 59/90/6858 06:40 AM    MCV 86.2 11/30/2022 06:40 AM     Lab Results   Component Value Date/Time    Sodium 136 11/30/2022 06:40 AM    Potassium 3.8 11/30/2022 06:40 AM    Chloride 106 11/30/2022 06:40 AM    CO2 20 (L) 11/30/2022 06:40 AM    Anion gap 10 11/30/2022 06:40 AM    Glucose 106 (H) 11/30/2022 06:40 AM    BUN 10 11/30/2022 06:40 AM    Creatinine 0.61 11/30/2022 06:40 AM    BUN/Creatinine ratio 16 11/30/2022 06:40 AM    GFR est AA >60 03/13/2022 01:17 PM    GFR est non-AA >60 03/13/2022 01:17 PM    Calcium 8.6 11/30/2022 06:40 AM     No results found for: APTT, PTP, INR, INREXT  Lab Results   Component Value Date/Time    Glucose 106 (H) 11/30/2022 06:40 AM     Past Medical History:   Diagnosis Date    AMA (advanced maternal age) multigravida 35+     Anxiety     Depression     Drug abuse ([facility] [address]     Postpartum depression     PTSD (post-traumatic stress disorder)     Trauma        No past surgical history on file.     Current Outpatient Medications   Medication Instructions    albuterol (PROVENTIL HFA, VENTOLIN HFA, PROAIR HFA) 90 mcg/actuation inhaler 2 Puffs, Inhalation, EVERY 6 HOURS AS NEEDED    budesonide-formoteroL (SYMBICORT) 160-4.5 mcg/actuation HFAA 2 Puffs, Inhalation, 2 TIMES DAILY AS NEEDED    docusate sodium (COLACE) 100 mg, Oral, 2 TIMES DAILY AS NEEDED    lurasidone (LATUDA) 60 mg, Oral, DAILY WITH BREAKFAST    prenatal vit no.130-iron-folic 27 mg iron- 784 mcg tab tablet 1 Tablet, Oral, DAILY       Current Facility-Administered Medications   Medication Dose Route Frequency    ondansetron (ZOFRAN) injection 4 mg  4 mg IntraVENous Q4H PRN    naloxone (NARCAN) injection 0.4 mg  0.4 mg IntraVENous PRN    lactated Ringers infusion  125 mL/hr IntraVENous CONTINUOUS    oxytocin (PITOCIN) 30 units in 500 mL infusion  0-20 lita-units/min IntraVENous TITRATE    penicillin G potassium (PFIZERPEN) 5 Million Units in 0.9% sodium chloride (MBP/ADV) 100 mL MBP  5 Million Units IntraVENous ONCE    penicillin G pot (PFIZERPEN) 2.5 Million Units in 50 ml 0.9% NaCl  2.5 Million Units IntraVENous Q4H       Patient Vitals for the past 24 hrs:   Temp Pulse Resp BP SpO2   11/30/22 0900 -- 92 -- 108/73 --   11/30/22 0831 -- 94 -- 115/73 --   11/30/22 0813 -- 95 -- 119/72 --   11/30/22 0751 -- -- -- -- (!) 84 %   11/30/22 0750 -- -- -- -- (!) 84 %   11/30/22 0735 -- -- -- -- 100 %   11/30/22 0734 -- -- -- -- 100 %   11/30/22 0730 -- (!) 101 -- 129/76 --   11/30/22 0729 -- -- -- -- 100 %   11/30/22 0724 -- -- -- -- 100 %   11/30/22 0719 -- -- -- -- 100 %   11/30/22 0715 36.7 °C (98 °F) 94 -- 129/84 --   11/30/22 0714 -- -- -- -- 100 %   11/30/22 0709 -- -- -- -- 99 %   11/30/22 0704 -- -- -- -- 100 %   11/30/22 0700 -- 96 -- 139/84 --   11/30/22 0659 -- -- -- -- 100 %   11/30/22 0654 -- -- -- -- 100 %   11/30/22 0649 -- -- -- -- 100 %   11/30/22 0645 36.5 °C (97.7 °F) (!) 105 22 114/61 --       Lab Results   Component Value Date/Time    WBC 16.5 (H) 11/30/2022 06:40 AM    HGB 9.1 (L) 11/30/2022 06:40 AM HCT 28.8 (L) 11/30/2022 06:40 AM    PLATELET 719 14/86/4437 06:40 AM    MCV 86.2 11/30/2022 06:40 AM     Lab Results   Component Value Date/Time    Sodium 136 11/30/2022 06:40 AM    Potassium 3.8 11/30/2022 06:40 AM    Chloride 106 11/30/2022 06:40 AM    CO2 20 (L) 11/30/2022 06:40 AM    Anion gap 10 11/30/2022 06:40 AM    Glucose 106 (H) 11/30/2022 06:40 AM    BUN 10 11/30/2022 06:40 AM    Creatinine 0.61 11/30/2022 06:40 AM    BUN/Creatinine ratio 16 11/30/2022 06:40 AM    GFR est AA >60 03/13/2022 01:17 PM    GFR est non-AA >60 03/13/2022 01:17 PM    Calcium 8.6 11/30/2022 06:40 AM     No results found for: APTT, PTP, INR, INREXT  Lab Results   Component Value Date/Time    Glucose 106 (H) 11/30/2022 06:40 AM         Physical Exam    Airway  Mallampati: I  TM Distance: 4 - 6 cm  Neck ROM: normal range of motion   Mouth opening: Normal     Cardiovascular    Rhythm: regular  Rate: normal         Dental  No notable dental hx       Pulmonary  Breath sounds clear to auscultation               Abdominal  GI exam deferred       Other Findings            Anesthetic Plan    ASA: 2  Anesthesia type: epidural    Monitoring Plan: Continuous noninvasive hemodynamic monitoring  Post-op pain plan if not by surgeon: epidural opioid and indwelling epidural catheter      Anesthetic plan and risks discussed with: Patient and Family      Discussed risks and benefits of epidural analgesia with patient/family in the patient holding room. Questions answered to patient/family satisfaction.

## 2022-11-30 NOTE — PROGRESS NOTES
1387 [facility] with Dr Dorothea Taylor. States he will be in to see the patient and evaluate her. Pause Suicide precautions until he comes in to assess her. 5438- Dr Dorothea Taylor in to see the patient at this time. VE performed.  AROM     [id number]- Dr Rachele Granados made aware of need for epidural
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
## 2022-11-30 NOTE — H&P
History and Physical    Patient: Bob Dykes MRN: [id number]  SSN: [id number]    YOB: 1980  Age: 40 y.o. Sex: female      Subjective:      Bob Dykes is a 40 y.o. female  at 38.1 weeks presents for IOL due to decreased FM, pt with social situation at home that is not a good environment. Denies any PIH sxs or LOF    Past Medical History:   Diagnosis Date    AMA (advanced maternal age) multigravida 35+     Anxiety     Depression     Drug abuse ([facility] [address]     Postpartum depression     PTSD (post-traumatic stress disorder)     Trauma      No past surgical history on file. Family History   Family history unknown: Yes     Social History     Tobacco Use    Smoking status: Heavy Smoker     Packs/day: 1.00     Types: Cigarettes    Smokeless tobacco: Never   Substance Use Topics    Alcohol use: Yes     Comment: rarely      Prior to Admission medications    Medication Sig Start Date End Date Taking? Authorizing Provider   docusate sodium (COLACE) 100 mg capsule Take 1 Capsule by mouth two (2) times daily as needed for Constipation for up to 60 doses. Indications: constipation 22  Yes Margarita Conti MD   budesonide-formoteroL (SYMBICORT) 160-4.5 mcg/actuation HFAA Take 2 Puffs by inhalation two (2) times daily as needed for PRN Reason (Other) (sob). 21  Yes Gracie Melendez MD   albuterol (PROVENTIL HFA, VENTOLIN HFA, PROAIR HFA) 90 mcg/actuation inhaler Take 2 Puffs by inhalation every six (6) hours as needed for Wheezing or Shortness of Breath. 21  Yes Gracie Melendez MD   prenatal vit no.130-iron-folic 27 mg iron- 160 mcg tab tablet Take 1 Tablet by mouth daily. 21  Yes Gracie Melendez MD   lurasidone (Latuda) 60 mg tab tablet Take 1 Tablet by mouth daily (with breakfast).  22   Opal Pavon MD        Allergies   Allergen Reactions    Nickel Rash       Review of Systems:  A comprehensive review of systems was negative except for that written in the History of Present Illness. Objective:     Vitals:    11/30/22 1030 11/30/22 1033 11/30/22 1038 11/30/22 1043   BP: 131/80      Pulse: (!) 101      Resp:       Temp:       SpO2:  100% 100% 100%   Weight:       Height:            Physical Exam:  GENERAL: alert, cooperative, no distress, appears stated age  [de-identified]: gravid, NST reactive  Cx: 3/V/-3, AROM--> Clear    Assessment:     Hospital Problems  Date Reviewed: 11/22/2022            Codes Class Noted POA    Pregnancy ICD-10-CM: Z34.90  ICD-9-CM: V22.2  11/30/2022 Unknown        Multigravida of advanced maternal age in third trimester ICD-10-CM: O09.523  ICD-9-CM: 659.63  7/8/2022 Yes        Polysubstance abuse ([facility] [address] ICD-10-CM: F19.10  ICD-9-CM: 305.90  12/14/2021 Yes        Anxiety ICD-10-CM: F41.9  ICD-9-CM: 300.00  12/12/2021 Yes           Plan:     Pitocin, Epidural PRN    Pt is not suicidal and doesn't need constant monitoring or 1 to 1 nursing.  Pt needs consult to Case management for resource that are available to her upon discharge    Signed By: Kervin Mora MD     November 30, 2022

## 2022-12-01 PROCEDURE — 74011250637 HC RX REV CODE- 250/637: Performed by: OBSTETRICS & GYNECOLOGY

## 2022-12-01 PROCEDURE — 99284 EMERGENCY DEPT VISIT MOD MDM: CPT

## 2022-12-01 PROCEDURE — 65410000002 HC RM PRIVATE OB

## 2022-12-01 RX ORDER — ACETAMINOPHEN 325 MG/1
650 TABLET ORAL
Status: DISCONTINUED | OUTPATIENT
Start: 2022-12-01 | End: 2022-12-02 | Stop reason: HOSPADM

## 2022-12-01 RX ADMIN — BISACODYL 5 MG: 5 TABLET, COATED ORAL at 17:50

## 2022-12-01 RX ADMIN — ACETAMINOPHEN 650 MG: 325 TABLET, FILM COATED ORAL at 14:07

## 2022-12-01 RX ADMIN — IBUPROFEN 800 MG: 800 TABLET, FILM COATED ORAL at 07:38

## 2022-12-01 RX ADMIN — ACETAMINOPHEN 650 MG: 325 TABLET, FILM COATED ORAL at 04:18

## 2022-12-01 RX ADMIN — IBUPROFEN 800 MG: 800 TABLET, FILM COATED ORAL at 17:50

## 2022-12-01 NOTE — LACTATION NOTE
This note was copied from a baby's chart. This mother wants to breastfeed her daughter. She tells me she has a history of inverted nipples. We are unable to get baby to maintain lath since she cannot grasp nipples. Baby is frustrated. Mother had a positive drug screen today and baby is bagged for urine. Mother assures me that she is not taking any medications and does not plan to. She is insistent on bf. Mother pumped with a hand pump for only three minutes and got about 15-20 ml of milk. She is able to latch baby with a nipple shield and she nurses well for 10/10. Discussed with mother her plan for feeding. Reviewed the benefits of exclusive breast milk feeding during the hospital stay. Informed her of the risks of using formula to supplement in the first few days of life as well as the benefits of successful breast milk feeding; referred her to the Breastfeeding booklet about this information. She acknowledges understanding of information reviewed and states that it is her plan to breastfeed her infant. Will support her choice and offer additional information as needed. Reviewed breastfeeding basics:  Supply and demand,  stomach size, early  Feeding cues, skin to skin, positioning and baby led latch-on, assymetrical latch with signs of good, deep latch vs shallow, feeding frequency and duration, and log sheet for tracking infant feedings and output. Breastfeeding Booklet and Warm line information given. Discussed typical  weight loss and the importance of infant weight checks with pediatrician 1-2 post discharge. Early Bonding:  Explain to mother that we place healthy term babies skin to skin with the mother immediately following delivery.   Newborns are to remain on mother's chest for a minimum of one hour or until infant has fed from the breast.  Ridge assessments should be performed as infant rests at mother's breast and other infant procedures should be delayed for at least one hour. All staff are to encourage Rooming-In. Explain to mother that research shows that mothers actually sleep better when baby is in close proximity to where mother is sleeping. Rooming-In enhances bonding time and facilitates frequent breastfeeding. Parents learn how to care for their baby when  remains in the room. I left my name and contact number on patient's whiteboard for her to call for additional assistance if needed.

## 2022-12-01 NOTE — PROGRESS NOTES
Problem: Suicide  Goal: *STG: Remains safe in hospital  Outcome: Progressing Towards Goal  Goal: *STG: Seeks staff when feelings of self harm or harm towards others arise  Outcome: Progressing Towards Goal  Goal: *STG: Attends activities and groups  Outcome: Progressing Towards Goal  Goal: *STG:  Verbalizes alternative ways of dealing with maladaptive feelings/behaviors  Outcome: Progressing Towards Goal  Goal: *STG/LTG: Complies with medication therapy  Outcome: Progressing Towards Goal  Goal: *STG/LTG: No longer expresses self destructive or suicidal thoughts  Outcome: Progressing Towards Goal  Goal: *LTG:  Identifies available community resources  Outcome: Progressing Towards Goal  Goal: *LTG:  Develops proactive suicide prevention plan  Outcome: Progressing Towards Goal  Goal: Interventions  Outcome: Progressing Towards Goal     Problem: Patient Education: Go to Patient Education Activity  Goal: Patient/Family Education  Outcome: Progressing Towards Goal     Problem: Patient Education: Go to Patient Education Activity  Goal: Patient/Family Education  Outcome: Progressing Towards Goal     Problem: Vaginal Delivery: Day of Delivery-Post delivery  Goal: Activity/Safety  Outcome: Progressing Towards Goal  Goal: Consults, if ordered  Outcome: Progressing Towards Goal  Goal: Nutrition/Diet  Outcome: Progressing Towards Goal  Goal: Discharge Planning  Outcome: Progressing Towards Goal  Goal: Medications  Outcome: Progressing Towards Goal  Goal: Treatments/Interventions/Procedures  Outcome: Progressing Towards Goal  Goal: *Vital signs within defined limits  Outcome: Progressing Towards Goal  Goal: *Labs within defined limits  Outcome: Progressing Towards Goal  Goal: *Hemodynamically stable  Outcome: Progressing Towards Goal  Goal: *Optimal pain control at patient's stated goal  Outcome: Progressing Towards Goal  Goal: *Participates in infant care  Outcome: Progressing Towards Goal  Goal: *Demonstrates progressive activity  Outcome: Progressing Towards Goal  Goal: *Tolerating diet  Outcome: Progressing Towards Goal     Problem: Vaginal Delivery: Postpartum Day 1  Goal: Activity/Safety  Outcome: Progressing Towards Goal  Goal: Consults, if ordered  Outcome: Progressing Towards Goal  Goal: Diagnostic Test/Procedures  Outcome: Progressing Towards Goal  Goal: Nutrition/Diet  Outcome: Progressing Towards Goal  Goal: Discharge Planning  Outcome: Progressing Towards Goal  Goal: Medications  Outcome: Progressing Towards Goal  Goal: Treatments/Interventions/Procedures  Outcome: Progressing Towards Goal  Goal: Psychosocial  Outcome: Progressing Towards Goal  Goal: *Vital signs within defined limits  Outcome: Progressing Towards Goal  Goal: *Labs within defined limits  Outcome: Progressing Towards Goal  Goal: *Hemodynamically stable  Outcome: Progressing Towards Goal  Goal: *Optimal pain control at patient's stated goal  Outcome: Progressing Towards Goal  Goal: *Participates in infant care  Outcome: Progressing Towards Goal  Goal: *Demonstrates progressive activity  Outcome: Progressing Towards Goal  Goal: *Performs self perineal care  Outcome: Progressing Towards Goal  Goal: *Appropriate parent-infant bonding  Outcome: Progressing Towards Goal  Goal: *Tolerating diet  Outcome: Progressing Towards Goal  Goal: *Performs self breast care  Outcome: Progressing Towards Goal     Problem: Vaginal Delivery: Postpartum 2  Goal: Activity/Safety  Outcome: Progressing Towards Goal  Goal: Consults, if ordered  Outcome: Progressing Towards Goal  Goal: Nutrition/Diet  Outcome: Progressing Towards Goal  Goal: Discharge Planning  Outcome: Progressing Towards Goal  Goal: Medications  Outcome: Progressing Towards Goal  Goal: Treatments/Interventions/Procedures  Outcome: Progressing Towards Goal  Goal: Psychosocial  Outcome: Progressing Towards Goal     Problem: Vaginal Delivery: Discharge Outcomes  Goal: *Verbalizes name, dosage, time, side effects, and number of days to continue medications  Outcome: Progressing Towards Goal  Goal: *Describes available resources and support systems  Outcome: Progressing Towards Goal  Goal: *No signs and symptoms of infection  Outcome: Progressing Towards Goal  Goal: *Birth certificate information completed  Outcome: Progressing Towards Goal  Goal: *Received and verbalizes understanding of discharge plan and instructions  Outcome: Progressing Towards Goal  Goal: *Vital signs within defined limits  Outcome: Progressing Towards Goal  Goal: *Labs within defined limits  Outcome: Progressing Towards Goal  Goal: *Hemodynamically stable  Outcome: Progressing Towards Goal  Goal: *Optimal pain control at patient's stated goal  Outcome: Progressing Towards Goal  Goal: *Participates in infant care  Outcome: Progressing Towards Goal  Goal: *Demonstrates progressive activity  Outcome: Progressing Towards Goal  Goal: *Appropriate parent-infant bonding  Outcome: Progressing Towards Goal  Goal: *Tolerating diet  Outcome: Progressing Towards Goal

## 2022-12-01 NOTE — PROGRESS NOTES
1905  BS report received from Wander Martínez, REGGIE. Pt ambulating in room. Pt states she has bled through pp panties and would like to change. Wander Martínez, REGGIE, set up BR with marysol. Writer gave pt new gown. 1928  Pt transferred to pp unit via w/c in good condition. Pt holding infant in arms. Pt belongings brought in cart wit pt via HERLINDA Archibald RN. BS report given to HERLINDA Martinez RN.

## 2022-12-01 NOTE — PROGRESS NOTES
Progress Note    Patient: Jodi Rodriguez MRN: [id number]  SSN: [id number]    YOB: 1980  Age: 40 y.o. Sex: female      Admit Date: 2022    LOS: 1 day     Subjective:     No Complaints    Objective:     Vitals:    22 1816 22 1945 22 2342 22 0408   BP: (!) 126/37 117/81 135/78 125/73   Pulse: (!) 141 (!) 113 92 89   Resp:  20 18 20   Temp:  98.2 °F (36.8 °C) 98 °F (36.7 °C) 98.3 °F (36.8 °C)   SpO2:  98% 98% 100%   Weight:       Height:            Intake and Output:  Current Shift: No intake/output data recorded. Last three shifts:  1901 -  0700  In: -   Out: 250     Physical Exam:   Abd:FF    Lab/Data Review: All lab results for the last 24 hours reviewed.          Assessment:     Active Problems:    Anxiety (2021)      Polysubstance abuse ([address] (2021)      Multigravida of advanced maternal age in third trimester (2022)      Pregnancy (2022)       (spontaneous vaginal delivery) (2022)        Plan:     Routine PP orders: PPD # 1   Case management    Signed By: Camryn Reid MD     2022

## 2022-12-01 NOTE — PROGRESS NOTES
2003: Patient oriented to room, Mommy manual, visitor policy, birth certificate worksheet, and how to call nursery at ext. [phone]. Telephone within reach. Patient also ambulatory in room without dizziness and has a steady gait. Patient instructed may be up without assistance but to please call for assistance from nurse before getting out of bed again if experiencing dizziness at any time. Patient voiced understanding and call bell within reach. Water pitcher filled. No other needs expressed.

## 2022-12-01 NOTE — PROGRESS NOTES
0820-Patient reports improvement in pain, pt resting with eyes closed, reports no other needs or concerns at this time. 0945-Patient reports no needs or concerns at this time, sitting up holding baby. 1130-Kellie and Tameka from l and d rounding on pt, pt has no needs at this time and requests that rounding be kept at a minimum so she can rest.  1300-Pt reports no needs or concerns at this time. Advised pt nurse will round less frequently as requested and call device is in pt reach. 1355-Patient emotional, states she needs pain medication and help to arrange a place to go when she leaves, also states she needs her Latuda. Pt instructed that resources have been consulted and will be here to see her today. 1410-CPS here to see pt.  1530-Patient sleeping,respirations regular and unlabored. Baby asleep in crib at bedside. 1615-Patient sleeping, respirations regular and unlabored, baby in crib asleep at bedside. 1745-Pt sitting up in bed baby on boppy at side of pt against side rail, suggested to pt to move baby to a different spot for safety, pt refusing, says her baby is fine, nursery notified.

## 2022-12-01 NOTE — PROGRESS NOTES
Problem: Suicide  Goal: *STG: Remains safe in hospital  Outcome: Progressing Towards Goal  Goal: *STG: Seeks staff when feelings of self harm or harm towards others arise  Outcome: Progressing Towards Goal  Goal: *STG: Attends activities and groups  Outcome: Progressing Towards Goal  Goal: *STG:  Verbalizes alternative ways of dealing with maladaptive feelings/behaviors  Outcome: Progressing Towards Goal  Goal: *STG/LTG: Complies with medication therapy  Outcome: Progressing Towards Goal  Goal: *STG/LTG: No longer expresses self destructive or suicidal thoughts  Outcome: Progressing Towards Goal  Goal: *LTG:  Identifies available community resources  Outcome: Progressing Towards Goal  Goal: *LTG:  Develops proactive suicide prevention plan  Outcome: Progressing Towards Goal  Goal: Interventions  Outcome: Progressing Towards Goal     Problem: Patient Education: Go to Patient Education Activity  Goal: Patient/Family Education  Outcome: Progressing Towards Goal     Problem: Patient Education: Go to Patient Education Activity  Goal: Patient/Family Education  Outcome: Progressing Towards Goal     Problem: Vaginal Delivery: Postpartum Day 1  Goal: Activity/Safety  Outcome: Progressing Towards Goal  Goal: Consults, if ordered  Outcome: Progressing Towards Goal  Goal: Diagnostic Test/Procedures  Outcome: Progressing Towards Goal  Goal: Nutrition/Diet  Outcome: Progressing Towards Goal  Goal: Discharge Planning  Outcome: Progressing Towards Goal  Goal: Medications  Outcome: Progressing Towards Goal  Goal: Treatments/Interventions/Procedures  Outcome: Progressing Towards Goal  Goal: Psychosocial  Outcome: Progressing Towards Goal  Goal: *Vital signs within defined limits  Outcome: Progressing Towards Goal  Goal: *Labs within defined limits  Outcome: Progressing Towards Goal  Goal: *Hemodynamically stable  Outcome: Progressing Towards Goal  Goal: *Optimal pain control at patient's stated goal  Outcome: Progressing Towards Goal  Goal: *Participates in infant care  Outcome: Progressing Towards Goal  Goal: *Demonstrates progressive activity  Outcome: Progressing Towards Goal  Goal: *Performs self perineal care  Outcome: Progressing Towards Goal  Goal: *Appropriate parent-infant bonding  Outcome: Progressing Towards Goal  Goal: *Tolerating diet  Outcome: Progressing Towards Goal  Goal: *Performs self breast care  Outcome: Progressing Towards Goal     Problem: Vaginal Delivery: Postpartum 2  Goal: Activity/Safety  Outcome: Progressing Towards Goal  Goal: Consults, if ordered  Outcome: Progressing Towards Goal  Goal: Nutrition/Diet  Outcome: Progressing Towards Goal  Goal: Discharge Planning  Outcome: Progressing Towards Goal  Goal: Medications  Outcome: Progressing Towards Goal  Goal: Treatments/Interventions/Procedures  Outcome: Progressing Towards Goal  Goal: Psychosocial  Outcome: Progressing Towards Goal     Problem: Vaginal Delivery: Discharge Outcomes  Goal: *Verbalizes name, dosage, time, side effects, and number of days to continue medications  Outcome: Progressing Towards Goal  Goal: *Describes available resources and support systems  Outcome: Progressing Towards Goal  Goal: *No signs and symptoms of infection  Outcome: Progressing Towards Goal  Goal: *Birth certificate information completed  Outcome: Progressing Towards Goal  Goal: *Received and verbalizes understanding of discharge plan and instructions  Outcome: Progressing Towards Goal  Goal: *Vital signs within defined limits  Outcome: Progressing Towards Goal  Goal: *Labs within defined limits  Outcome: Progressing Towards Goal  Goal: *Hemodynamically stable  Outcome: Progressing Towards Goal  Goal: *Optimal pain control at patient's stated goal  Outcome: Progressing Towards Goal  Goal: *Participates in infant care  Outcome: Progressing Towards Goal  Goal: *Demonstrates progressive activity  Outcome: Progressing Towards Goal  Goal: *Appropriate parent-infant bonding  Outcome: Progressing Towards Goal  Goal: *Tolerating diet  Outcome: Progressing Towards Goal
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
## 2022-12-02 ENCOUNTER — HOSPITAL ENCOUNTER (INPATIENT)
Age: 42
LOS: 3 days | Discharge: HOME | End: 2022-12-05
Attending: PSYCHIATRY & NEUROLOGY | Admitting: PSYCHIATRY & NEUROLOGY
Payer: MEDICARE

## 2022-12-02 VITALS
SYSTOLIC BLOOD PRESSURE: 138 MMHG | HEIGHT: 65 IN | DIASTOLIC BLOOD PRESSURE: 84 MMHG | WEIGHT: 200 LBS | HEART RATE: 110 BPM | BODY MASS INDEX: 33.32 KG/M2 | RESPIRATION RATE: 18 BRPM | OXYGEN SATURATION: 98 % | TEMPERATURE: 97.9 F

## 2022-12-02 LAB
FLUAV RNA SPEC QL NAA+PROBE: NOT DETECTED
FLUBV RNA SPEC QL NAA+PROBE: NOT DETECTED
SARS-COV-2, COV2: NOT DETECTED

## 2022-12-02 PROCEDURE — 87636 SARSCOV2 & INF A&B AMP PRB: CPT

## 2022-12-02 PROCEDURE — 65270000029 HC RM PRIVATE

## 2022-12-02 PROCEDURE — 74011250637 HC RX REV CODE- 250/637: Performed by: OBSTETRICS & GYNECOLOGY

## 2022-12-02 PROCEDURE — 99239 HOSP IP/OBS DSCHRG MGMT >30: CPT | Performed by: OBSTETRICS & GYNECOLOGY

## 2022-12-02 RX ORDER — IBUPROFEN 800 MG/1
800 TABLET ORAL
Qty: 40 TABLET | Refills: 0 | Status: SHIPPED | OUTPATIENT
Start: 2022-12-02 | End: 2022-12-05

## 2022-12-02 RX ADMIN — IBUPROFEN 800 MG: 800 TABLET, FILM COATED ORAL at 08:53

## 2022-12-02 RX ADMIN — ACETAMINOPHEN 650 MG: 325 TABLET, FILM COATED ORAL at 16:10

## 2022-12-02 NOTE — PROGRESS NOTES
Problem: Suicide  Goal: *STG: Remains safe in hospital  Outcome: Resolved/Met  Goal: *STG: Seeks staff when feelings of self harm or harm towards others arise  Outcome: Resolved/Met  Goal: *STG: Attends activities and groups  Outcome: Resolved/Met  Goal: *STG:  Verbalizes alternative ways of dealing with maladaptive feelings/behaviors  Outcome: Resolved/Met  Goal: *STG/LTG: Complies with medication therapy  Outcome: Resolved/Met  Goal: *STG/LTG: No longer expresses self destructive or suicidal thoughts  Outcome: Resolved/Met  Goal: *LTG:  Identifies available community resources  Outcome: Resolved/Met  Goal: *LTG:  Develops proactive suicide prevention plan  Outcome: Resolved/Met  Goal: Interventions  Outcome: Resolved/Met     Problem: Patient Education: Go to Patient Education Activity  Goal: Patient/Family Education  Outcome: Resolved/Met     Problem: Patient Education: Go to Patient Education Activity  Goal: Patient/Family Education  Outcome: Resolved/Met     Problem: Vaginal Delivery: Postpartum Day 1  Goal: Activity/Safety  Outcome: Resolved/Met  Goal: Consults, if ordered  Outcome: Resolved/Met  Goal: Diagnostic Test/Procedures  Outcome: Resolved/Met  Goal: Nutrition/Diet  Outcome: Resolved/Met  Goal: Discharge Planning  Outcome: Resolved/Met  Goal: Medications  Outcome: Resolved/Met  Goal: Treatments/Interventions/Procedures  Outcome: Resolved/Met  Goal: Psychosocial  Outcome: Resolved/Met  Goal: *Vital signs within defined limits  Outcome: Resolved/Met  Goal: *Labs within defined limits  Outcome: Resolved/Met  Goal: *Hemodynamically stable  Outcome: Resolved/Met  Goal: *Optimal pain control at patient's stated goal  Outcome: Resolved/Met  Goal: *Participates in infant care  Outcome: Resolved/Met  Goal: *Demonstrates progressive activity  Outcome: Resolved/Met  Goal: *Performs self perineal care  Outcome: Resolved/Met  Goal: *Appropriate parent-infant bonding  Outcome: Resolved/Met  Goal: *Tolerating diet  Outcome: Resolved/Met  Goal: *Performs self breast care  Outcome: Resolved/Met     Problem: Vaginal Delivery: Postpartum 2  Goal: Activity/Safety  Outcome: Resolved/Met  Goal: Consults, if ordered  Outcome: Resolved/Met  Goal: Nutrition/Diet  Outcome: Resolved/Met  Goal: Discharge Planning  Outcome: Resolved/Met  Goal: Medications  Outcome: Resolved/Met  Goal: Treatments/Interventions/Procedures  Outcome: Resolved/Met  Goal: Psychosocial  Outcome: Resolved/Met     Problem: Vaginal Delivery: Discharge Outcomes  Goal: *Verbalizes name, dosage, time, side effects, and number of days to continue medications  Outcome: Resolved/Met  Goal: *Describes available resources and support systems  Outcome: Resolved/Met  Goal: *No signs and symptoms of infection  Outcome: Resolved/Met  Goal: *Birth certificate information completed  Outcome: Resolved/Met  Goal: *Received and verbalizes understanding of discharge plan and instructions  Outcome: Resolved/Met  Goal: *Vital signs within defined limits  Outcome: Resolved/Met  Goal: *Labs within defined limits  Outcome: Resolved/Met  Goal: *Hemodynamically stable  Outcome: Resolved/Met  Goal: *Optimal pain control at patient's stated goal  Outcome: Resolved/Met  Goal: *Participates in infant care  Outcome: Resolved/Met  Goal: *Demonstrates progressive activity  Outcome: Resolved/Met  Goal: *Appropriate parent-infant bonding  Outcome: Resolved/Met  Goal: *Tolerating diet  Outcome: Resolved/Met
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
## 2022-12-02 NOTE — DISCHARGE SUMMARY
Obstetrical Discharge Summary     Name: Joel Mckinney MRN: [id number]  SSN: [id number]    YOB: 1980  Age: 40 y.o. Sex: female      Allergies: Nickel    Admit Date: 2022    Discharge Date: 2022     Admitting Physician: Heidi Maldonado MD     Attending Physician:  Fay George MD     * Admission Diagnoses: Pregnancy [Z34.90]    * Discharge Diagnoses:   Information for the patient's :  [place] [[id number]]   Delivery of a 8 lb 6 oz (3.8 kg) female infant via Vaginal, Spontaneous on 2022 at 4:30 PM  by Heidi Maldonado. Apgars were 9  and 9 . Additional Diagnoses:   Problem List as of 2022 Date Reviewed: 2022            Codes Class Noted - Resolved    Pregnancy ICD-10-CM: Z34.90  ICD-9-CM: V22.2  2022 - Present         (spontaneous vaginal delivery) ICD-10-CM: O80  ICD-9-CM: 650  2022 - Present        E. coli UTI ICD-10-CM: N39.0, B96.20  ICD-9-CM: 599.0, 041.49  2022 - Present        Abdominal pain in pregnancy, third trimester ICD-10-CM: O26.893, R10.9  ICD-9-CM: 646.83, 789.00  2022 - Present        35 weeks gestation of pregnancy ICD-10-CM: Z3A.35  ICD-9-CM: V22.2  2022 - Present        Acute cystitis without hematuria ICD-10-CM: N30.00  ICD-9-CM: 595.0  2022 - Present        Multigravida of advanced maternal age in third trimester ICD-10-CM: O09.523  ICD-9-CM: 659.63  2022 - Present        Polysubstance abuse ([address] ICD-10-CM: F19.10  ICD-9-CM: 305.90  2021 - Present        Amenorrhea ICD-10-CM: N91.2  ICD-9-CM: 626.0  2021 - Present        Anxiety ICD-10-CM: F41.9  ICD-9-CM: 300.00  2021 - Present        Depression ICD-10-CM: F32. A  ICD-9-CM: 910  2021 - Present        RESOLVED: Positive blood pregnancy test ICD-10-CM: Z32.01  ICD-9-CM: V72.42  2021 - 2022          Lab Results   Component Value Date/Time    ABO/Rh(D) Larena Due Positive 2022 06:40 AM       Immunization(s): There is no immunization history for the selected administration types on file for this patient. * Procedures: vaginal delivery  * No surgery found *           * Discharge Condition: serious to be discharge to the care Psychiatric team    [facility] Course: Postpartum course was complicated by Psych. * Disposition:  Psych    Discharge Medications:   Current Discharge Medication List        START taking these medications    Details   !! ibuprofen (MOTRIN) 800 mg tablet Take 1 Tablet by mouth every eight (8) hours as needed for Pain. Qty: 40 Tablet, Refills: 0  Start date: 2022      !! ibuprofen (MOTRIN) 800 mg tablet Take 1 Tablet by mouth every eight (8) hours as needed for Pain. Qty: 30 Tablet, Refills: 0  Start date: 2022      oxyCODONE IR (ROXICODONE) 5 mg immediate release tablet Take 1 Tablet by mouth every four (4) hours as needed for Pain for up to 3 days. Max Daily Amount: 30 mg.  Qty: 16 Tablet, Refills: 0  Start date: 2022, End date: 12/3/2022    Associated Diagnoses:  (spontaneous vaginal delivery)       ! ! - Potential duplicate medications found. Please discuss with provider. CONTINUE these medications which have NOT CHANGED    Details   docusate sodium (COLACE) 100 mg capsule Take 1 Capsule by mouth two (2) times daily as needed for Constipation for up to 60 doses. Indications: constipation  Qty: 60 Capsule, Refills: 0      budesonide-formoteroL (SYMBICORT) 160-4.5 mcg/actuation HFAA Take 2 Puffs by inhalation two (2) times daily as needed for PRN Reason (Other) (sob). Qty: 10.2 g, Refills: 0      albuterol (PROVENTIL HFA, VENTOLIN HFA, PROAIR HFA) 90 mcg/actuation inhaler Take 2 Puffs by inhalation every six (6) hours as needed for Wheezing or Shortness of Breath. Qty: 18 g, Refills: 0      prenatal vit no.130-iron-folic 27 mg iron- 393 mcg tab tablet Take 1 Tablet by mouth daily.   Qty: 30 Tablet, Refills: 0      lurasidone (Latuda) 60 mg tab tablet Take 1 Tablet by mouth daily (with breakfast). Qty: 30 Tablet, Refills: 2    Associated Diagnoses: Depression affecting pregnancy in third trimester, antepartum             * Follow-up Care/Patient Instructions:   Activity: Activity as tolerated  Diet: Regular Diet  Wound Care: None needed    Follow-up Information       Follow up With Specialties Details Why Contact Info    None    None ([phone]) Patient stated that they have no PCP               Total time spent with patient > 30 min

## 2022-12-02 NOTE — DISCHARGE INSTRUCTIONS
Postpartum: Care Instructions  Overview  After childbirth (postpartum period), your body goes through many changes. Some of these changes happen over several weeks. In the hours after delivery, your body will begin to recover from childbirth while it prepares to breastfeed your . You may feel emotional during this time. Your hormones can shift your mood without warning for no clear reason. In the first couple of weeks after childbirth, it's common to have emotions that change from happy to sad. You may find it hard to sleep. You may cry a lot. This is called the \"baby blues. \" These overwhelming emotions often go away within a couple of days or weeks. But it's important to discuss your feelings with your doctor. It's easy to get too tired and overwhelmed during the first weeks after childbirth. Don't try to do too much. Get rest whenever you can, accept help from others, and eat well and drink plenty of fluids. In the first couple of weeks after you give birth, your doctor or midwife may want to check in with you and make a plan for any follow-up care you may need. You will likely have a complete postpartum visit in the first 3 months after delivery. At that time, your doctor or midwife will check on your recovery from childbirth and see how you're doing with your emotions. You may also discuss your concerns or questions. Follow-up care is a key part of your treatment and safety. Be sure to make and go to all appointments, and call your doctor if you are having problems. It's also a good idea to know your test results and keep a list of the medicines you take. How can you care for yourself at home? Sleep or rest when your baby sleeps. Get help with household chores from family or friends, if you can. Don't try to do it all yourself. If you have hemorrhoids or swelling or pain around the opening of your vagina, try using cold and heat.  You can put ice or a cold pack on the area for 10 to 20 minutes at a time. Put a thin cloth between the ice and your skin. Also try sitting in a few inches of warm water (sitz bath) 3 times a day and after bowel movements. Take pain medicines exactly as directed. If the doctor gave you a prescription medicine for pain, take it as prescribed. If you are not taking a prescription pain medicine, ask your doctor if you can take an over-the-counter medicine. Eat more fiber to avoid constipation. Include foods such as whole-grain breads and cereals, raw vegetables, raw and dried fruits, and beans. Drink plenty of fluids. If you have kidney, heart, or liver disease and have to limit fluids, talk with your doctor before you increase the amount of fluids you drink. Do not rinse inside your vagina with fluids (douche). If you have stitches, keep the area clean by pouring or spraying warm water over the area outside your vagina and anus after you use the toilet. Keep a list of questions to ask your doctor or midwife. Your questions might be about:  Changes in your breasts, such as lumps or soreness. When to expect your menstrual period to start again. What form of birth control is best for you. Weight you have put on during the pregnancy. Exercise options. What foods and drinks are best for you, especially if you are breastfeeding. Problems you might be having with breastfeeding. When you can have sex. You may want to talk about lubricants for your vagina. Any feelings of sadness or restlessness that you are having. When should you call for help? Share this information with your partner, family, or a friend. They can help you watch for warning signs. Call 911  anytime you think you may need emergency care. For example, call if:    You have thoughts of harming yourself, your baby, or another person. You passed out (lost consciousness). You have chest pain, are short of breath, or cough up blood. You have a seizure.    Call your doctor now or seek immediate medical care if:    You have signs of hemorrhage (too much bleeding), such as:  Heavy vaginal bleeding. This means that you are soaking through one or more pads in an hour. Or you pass blood clots bigger than an egg. Feeling dizzy or lightheaded, or you feel like you may faint. Feeling so tired or weak that you cannot do your usual activities. A fast or irregular heartbeat. New or worse belly pain. You have signs of infection, such as:  A fever. Vaginal discharge that smells bad. New or worse belly pain. You have symptoms of a blood clot in your leg (called a deep vein thrombosis), such as:  Pain in the calf, back of the knee, thigh, or groin. Redness and swelling in your leg or groin. You have signs of preeclampsia, such as:  Sudden swelling of your face, hands, or feet. New vision problems (such as dimness, blurring, or seeing spots). A severe headache. Watch closely for changes in your health, and be sure to contact your doctor if:    Your vaginal bleeding isn't decreasing. You feel sad, anxious, or hopeless for more than a few days. You are having problems with your breasts or breastfeeding. Where can you learn more? Go to http://www.gray.com/  Enter N933 in the search box to learn more about \"Postpartum: Care Instructions. \"  Current as of: February 23, 2022               Content Version: 13.4  © 2006-2022 [employer]. Care instructions adapted under license by [employer] (which disclaims liability or warranty for this information). If you have questions about a medical condition or this instruction, always ask your healthcare professional. [place] [address] any warranty or liability for your use of this information. Depression After Childbirth: Care Instructions  It's common to lose sleep, feel irritable, and cry easily during the first few days after childbirth.  Hormone changes and the demands of a new baby can cause these \"baby blues. \" If these mood changes last more than 2 weeks, you may have postpartum depression. This is a medical condition that requires treatment. If you have any of these signs, you may be depressed. See your doctor right away. You feel very sad or hopeless and lose interest in daily activities. You sleep too much or not enough. You feel tired or as if you have no energy. You eat too much or too little. You write or talk about death. Where to get help 24 hours a day, 7 days a week   If you or someone you know talks about suicide, self-harm, a mental health crisis, a substance use crisis, or any other kind of emotional distress, get help right away. You can:   Call the Suicide and Crisis Lifeline at 65. Call 9-623-870-TALK (). 4-370.113.5438    Text HOME to 279045 to access the Crisis Text Line. Consider saving these numbers in your phone. What you can do   Try to go to all of your counseling sessions. Take medicines as directed. Eat healthy foods. Get daily exercise, such as walks. Try to get some sunlight every day. Avoid using alcohol or other substances. Get as much rest as possible. Connect with friends, and join a support group for new parents. When should you call for help? Call 499 if: You feel you cannot stop from hurting yourself, your baby, or someone else. Call your doctor now or seek immediate medical care if:    You are having trouble caring for yourself or your baby. You hear voices. Contact your doctor if:    You have problems with your medicines. You do not get better as expected. Follow-up care is a key part of your treatment and safety. Be sure to make and go to all appointments, and call your doctor if you are having problems. It's also a good idea to know your test results and keep a list of the medicines you take. Where can you learn more?   Go to http://www.gray.com/  Enter [id number] in the search box to learn more about \"Depression After Childbirth: Care Instructions. \"  Current as of: February 9, 2022               Content Version: 13.4  © 2006-2022 [employer]. Care instructions adapted under license by [employer] for iPhone and Android (which disclaims liability or warranty for this information). If you have questions about a medical condition or this instruction, always ask your healthcare professional. [address] any warranty or liability for your use of this information. Constipation: Care Instructions  Overview     Constipation means that you have a hard time passing stools (bowel movements). People pass stools from 3 times a day to once every 3 days. What is normal for you may be different. Constipation may occur with pain in the rectum and cramping. The pain may get worse when you try to pass stools. Sometimes there are small amounts of bright red blood on toilet paper or the surface of stools. This is because of enlarged veins near the rectum (hemorrhoids). A few changes in your diet and lifestyle may help you avoid ongoing constipation. Your doctor may also prescribe medicine to help loosen your stool. Some medicines can cause constipation. These include pain medicines and antidepressants. Tell your doctor about all the medicines you take. Your doctor may want to make a medicine change to ease your symptoms. Follow-up care is a key part of your treatment and safety. Be sure to make and go to all appointments, and call your doctor if you are having problems. It's also a good idea to know your test results and keep a list of the medicines you take. How can you care for yourself at home? Drink plenty of fluids. If you have kidney, heart, or liver disease and have to limit fluids, talk with your doctor before you increase the amount of fluids you drink.   Include high-fiber foods in your diet each day. These include fruits, vegetables, beans, and whole grains. Get at least 30 minutes of exercise on most days of the week. Walking is a good choice. You also may want to do other activities, such as running, swimming, cycling, or playing tennis or team sports. Take a fiber supplement, such as Citrucel or Metamucil, every day. Read and follow all instructions on the label. Schedule time each day for a bowel movement. A daily routine may help. Take your time having a bowel movement, but don't sit for more than 10 minutes at a time. And don't strain too much. Support your feet with a small step stool when you sit on the toilet. This helps flex your hips and places your pelvis in a squatting position. Your doctor may recommend an over-the-counter laxative to relieve your constipation. Examples are Milk of Magnesia and MiraLax. Read and follow all instructions on the label. Do not use laxatives on a long-term basis. When should you call for help? Call your doctor now or seek immediate medical care if:    You have new or worse belly pain. You have new or worse nausea or vomiting. You have blood in your stools. Watch closely for changes in your health, and be sure to contact your doctor if:    Your constipation is getting worse. You do not get better as expected. Where can you learn more? Go to http://www.reed.com/  Enter P343 in the search box to learn more about \"Constipation: Care Instructions. \"  Current as of: June 6, 2022               Content Version: 13.4  © 2006-2022 [employer]. Care instructions adapted under license by [employer] (which disclaims liability or warranty for this information). If you have questions about a medical condition or this instruction, always ask your healthcare professional. [place] [address] any warranty or liability for your use of this information.          Bottle-Feeding: Care Instructions  Overview     Your reasons for wanting to bottle-feed your baby with formula are personal. You and your partner can make the best decision for you and your baby. Formulas can provide all the calories and nutrients your baby needs in the first 6 months of life. Several types of formulas are available. Most babies start with a cow's milk-based formula. Talk to your doctor before trying other types of formulas, which include soy and lactose-free formulas. At first, preparing the bottles and formula can seem confusing, but it gets easier and faster with some practice. Your  baby probably will want to eat every 2 to 3 hours. Do not worry about the exact timing for the first few weeks, but feed your baby whenever he or she is hungry. In general, your baby should not go longer than 4 hours without eating during the day for the first few months. Sit in a comfortable chair with your arms supported on pillows. Look into your baby's eyes and talk or sing while you are giving the bottle. Enjoy this special time you have with your baby. Follow-up care is a key part of your child's treatment and safety. Be sure to make and go to all appointments, and call your doctor if your child is having problems. It's also a good idea to know your child's test results and keep a list of the medicines your child takes. At each well-baby visit, talk to your doctor about your baby's nutritional needs, which change as he or she grows and develops. How can you care for yourself at home? Prepare your supplies for bottle-feeding before your baby is born, if possible. Have a supply of small bottles (usually 4 ounces) for your baby's first few weeks. You may want to buy a variety of bottle nipples so you can see which type your baby likes. Before using bottles and nipples the first time, wash them in hot water and dish soap and rinse with hot water. Ask your doctor which formula to use.  You can buy formula as a liquid concentrate or a powder that you mix with water. Formulas also come in a ready-to-feed form. Always use formula with added iron unless the doctor says not to. Make sure you have clean, safe water to mix with the formula. If you are not sure if your water is safe, you can use bottled water or you can boil tap water. Boil cold tap water for 1 minute, then cool the water to room temperature. Use the boiled water to mix the formula within 30 minutes. Wash your hands before preparing formula. Read the label to see how much water to mix with the formula. If you add too little water, it can upset your baby's stomach. If you add too much water, your baby will not get the right nutrition. Cover the prepared formula and store it in a refrigerator. Use it within 24 hours. Soak dirty baby bottles in water and dish soap. Wash bottles and nipples in the upper rack of the  or hand-wash them in hot water with dish soap. To bottle-feed your baby  Warm the formula to room temperature or body temperature before feeding. The best way to warm it is in a bowl of heated water. Do not use a microwave, which can cause hot spots in the formula that can burn your baby's mouth. Before feeding your baby, check the temperature of the formula by dripping 2 or 3 drops on the inside of your wrist. It should be warm, not cold or hot. Place a bib or cloth under your baby's chin to help keep clothes clean. Have a second cloth handy to use when burping your baby. Support your baby with one arm, with your baby's head resting in the bend of your elbow. Keep your baby's head higher than his or her chest.  Stroke the center of your baby's lower lip to encourage the mouth to open wider. A wide mouth will cover more of the nipple and will help reduce the amount of air the baby sucks in. Angle the bottle so the neck of the bottle and the nipple stay full of milk. This helps reduce how much air your baby swallows.   Do not prop the bottle in your baby's mouth or let him or her hold it alone. This increases your baby's chances of choking or getting ear infections. During the first few weeks, burp your baby after every 2 ounces of formula. This helps get rid of swallowed air and reduces spitting up. You will know your baby is full when he or she stops sucking. Your baby may spit out the nipple, turn his or her head away, or fall asleep when full. [place] babies usually drink from 1 to 3 ounces each feeding. Throw away any formula left in the bottle after you have fed your baby. Bacteria can grow in the leftover formula. It can be helpful to hold your baby upright for about 30 minutes after eating to reduce spitting up. When should you call for help? Watch closely for changes in your child's health, and be sure to contact your doctor if:    Your child does not seem to be growing and gaining weight. Your child has trouble passing stools, or his or her stools are hard and dry. Your child is vomiting. Your child has diarrhea or a skin rash. Your child cries most of the time. Your child has gas, bloating, or cramps after drinking a bottle. Where can you learn more? Go to http://www.gray.com/  Enter P111 in the search box to learn more about \"Bottle-Feeding: Care Instructions. \"  Current as of: 2021               Content Version: 13.4   [employer]. Care instructions adapted under license by [employer] (which disclaims liability or warranty for this information). If you have questions about a medical condition or this instruction, always ask your healthcare professional. [place] [address] any warranty or liability for your use of this information.

## 2022-12-02 NOTE — CONSULTS
Consult Date: 12/2/2022    Consults    Subjective     Pt is a 39 y/o female with a h/o PTSD, polysubstance abuse, depression, anxiety and postpartum depression who was consulted for paranoia and delusions after giving birth to her baby. Per nursing, to believes that she is rich, and that her baby is , so everyone is trying to take advantage of her and her baby. She believes that if she had a guardian then they would be able to protect her and the baby. However, in reality, pt is living in a shack without electricity. CPS has been involved  a s per the report. She is unstable, with moods switching from pleasant to tearful frequently. Pt's drug screen upon admission was positive for amphetamines, and the baby was also positive for amphetamine. Of note, she had been prescribed Latuda but does not recall the last time she took the medication. Pt has 3 other children who are in their 25s, but they are unable to provide for the baby. Additionally, the baby's father does not want to claim the baby as he does not believe himself to be the real father. Past Medical History:   Diagnosis Date    AMA (advanced maternal age) multigravida 35+     Anxiety     Depression     Drug abuse ([facility] [address]     Postpartum depression     PTSD (post-traumatic stress disorder)     Trauma       No past surgical history on file.   Family History   Family history unknown: Yes      Social History     Tobacco Use    Smoking status: Heavy Smoker     Packs/day: 1.00     Types: Cigarettes    Smokeless tobacco: Never   Substance Use Topics    Alcohol use: Yes     Comment: rarely       Current Facility-Administered Medications   Medication Dose Route Frequency Provider Last Rate Last Admin    acetaminophen (TYLENOL) tablet 650 mg  650 mg Oral Q4H PRN Anita Stauffer MD   650 mg at 12/01/22 1407    lactated Ringers infusion  125 mL/hr IntraVENous CONTINUOUS Anita Stauffer MD        ibuprofen (MOTRIN) tablet 800 mg  800 mg Oral Q8H PRN Blanca Husain MD   800 mg at 12/02/22 1754    naloxone [facility]) injection 0.4 mg  0.4 mg IntraVENous PRN Blanca Husain MD        oxytocin (PITOCIN) 10 unit bolus from bag  10 Units IntraVENous PRN Blanca Husain MD        ondansetron (ZOFRAN ODT) tablet 4 mg  4 mg Oral Q8H PRN Blanca Husain MD        [facility] mg  80 mg Oral QID PRN Blanca Husain MD        bisacodyL (DULCOLAX) tablet 5 mg  5 mg Oral DAILY PRN Blanca Husain MD   5 mg at 12/01/22 1750    diphenhydrAMINE (BENADRYL) capsule 25 mg  25 mg Oral Q4H PRN Blanca Husain MD        zolpidem [facility]) tablet 5 mg  5 mg Oral QHS PRN Blanca Husain MD        witch hazel-glycerin (TUCKS) 12.5-50 % pads 1 Pad  1 Pad PeriANAL PRN Blanca Husain MD        hydrocortisone-pramoxine [facility]) 2.5-1 % (4g) cream   Topical PRN Blanca Husain MD            Review of Systems   Reason unable to perform ROS: psychatric condition. Objective     Vital signs for last 24 hours:  Visit Vitals  BP (!) 144/86 (BP 1 Location: Left upper arm)   Pulse (!) 117   Temp 97.5 °F (36.4 °C)   Resp 18   Ht 5' 5\" (1.651 m)   Wt 90.7 kg (200 lb)   LMP 03/07/2022 (Exact Date)   SpO2 98%   Breastfeeding Yes   BMI 33.28 kg/m²         Assessment and Plan  Postpartum depression with psychotic feature  Drug abuse  PTSD by history    Admit patient to [address] [place]  Monitor pt for safety precautions  Start on antipsychotics like, Latuda 40 mg p.o. daily for mood, disturbed thought process  Provided support and encouragement to stabilize on medications.   Patient to be allowed to check on the telephone status of her baby at the nursery  Case management / to address continue to address step down plans in 2-3 days, engage family, nursing staff for baby, OB postpartum staff,       Current Facility-Administered Medications:     acetaminophen (TYLENOL) tablet 650 mg, 650 mg, Oral, Q4H PRN, Blanca Husain MD, 650 mg at 12/02/22 1610    lactated Ringers infusion, 125 mL/hr, IntraVENous, CONTINUOUS, Clarke Foley MD    ibuprofen (MOTRIN) tablet 800 mg, 800 mg, Oral, Q8H PRN, Clarke Foley MD, 800 mg at 12/02/22 0853    naloxone [facility]) injection 0.4 mg, 0.4 mg, IntraVENous, PRN, Clarke Foley MD    oxytocin (PITOCIN) 10 unit bolus from bag, 10 Units, IntraVENous, PRN **AND** [COMPLETED] oxytocin (PITOCIN) 30 units in 500 mL infusion, 87.3 lita-units/min, IntraVENous, PRN, Clarke Foley MD, Last Rate: 87.3 mL/hr at 11/30/22 1639, 87.3 lita-units/min at 11/30/22 1639    ondansetron (ZOFRAN ODT) tablet 4 mg, 4 mg, Oral, Q8H PRN, Clarke Foley MD    [facility]) tablet 80 mg, 80 mg, Oral, QID PRN, Clarke Foley MD    bisacodyL (DULCOLAX) tablet 5 mg, 5 mg, Oral, DAILY PRN, Clarke Foley MD, 5 mg at 12/01/22 1750    diphenhydrAMINE (BENADRYL) capsule 25 mg, 25 mg, Oral, Q4H PRN, Clarke Foley MD    zolpidem (AMBIEN) tablet 5 mg, 5 mg, Oral, QHS PRN, Clarke Malling, MD    witch hazel-glycerin (TUCKS) 12.5-50 % pads 1 Pad, 1 Pad, PeriANAL, PRN, Clarke Foley MD    hydrocortisone-pramoxine [facility]) 2.5-1 % (4g) cream, , Topical, PRN, Clarke Foley MD

## 2022-12-02 NOTE — PROGRESS NOTES
[address] [facility] called, message 0930-CPS from [place] in to see pt, pt tearful. 1115-Pt requesting to leave unit to smoke, pt informed of hospital policy and left floor with s/o.  1130-Nursery transported baby to nursery, pt left unit with s/o to go downstairs. 1150-Pt returned to room 325 from downstairts alone. 1250-Spoke with pt concerning an option to voluntarily go to behavorial health, pt agreed, Dr Carlos A Gu in room to discuss transfer, pt agreed. 1420-Patient sleeping, respirations regular and unlabored, baby in nursery. 1500-CPS from  here to see pt and form a plan. 1610-Vitals obtained see flow sheet, tylenol given for cramping. Covid swab completed for admission to [address] [place] [address]. Pt informed waiting for bed to open up before we can transfer her. Pt began feeding baby. 1710-Patient sleeping, respirations regular and unlabored, baby in crib at bedside sleeping. 1815-Patient asleep, respirations regular and unlabored, baby in crib at bedside sleeping.

## 2022-12-03 PROCEDURE — 65220000003 HC RM SEMIPRIVATE PSYCH

## 2022-12-03 PROCEDURE — 74011250637 HC RX REV CODE- 250/637: Performed by: PSYCHIATRY & NEUROLOGY

## 2022-12-03 RX ORDER — IBUPROFEN 200 MG
1 TABLET ORAL DAILY
Status: DISCONTINUED | OUTPATIENT
Start: 2022-12-03 | End: 2022-12-05 | Stop reason: HOSPADM

## 2022-12-03 RX ORDER — SIMETHICONE 80 MG
80 TABLET,CHEWABLE ORAL
Status: DISCONTINUED | OUTPATIENT
Start: 2022-12-03 | End: 2022-12-05 | Stop reason: HOSPADM

## 2022-12-03 RX ORDER — TRAZODONE HYDROCHLORIDE 50 MG/1
50 TABLET ORAL
Status: DISCONTINUED | OUTPATIENT
Start: 2022-12-03 | End: 2022-12-05 | Stop reason: HOSPADM

## 2022-12-03 RX ORDER — DIPHENHYDRAMINE HCL 25 MG
25 CAPSULE ORAL
Status: DISCONTINUED | OUTPATIENT
Start: 2022-12-03 | End: 2022-12-05 | Stop reason: HOSPADM

## 2022-12-03 RX ORDER — BISACODYL 5 MG
5 TABLET, DELAYED RELEASE (ENTERIC COATED) ORAL DAILY PRN
Status: DISCONTINUED | OUTPATIENT
Start: 2022-12-03 | End: 2022-12-05 | Stop reason: HOSPADM

## 2022-12-03 RX ORDER — ADHESIVE BANDAGE
30 BANDAGE TOPICAL DAILY PRN
Status: DISCONTINUED | OUTPATIENT
Start: 2022-12-03 | End: 2022-12-05 | Stop reason: HOSPADM

## 2022-12-03 RX ORDER — IBUPROFEN 400 MG/1
800 TABLET ORAL
Status: DISCONTINUED | OUTPATIENT
Start: 2022-12-03 | End: 2022-12-05 | Stop reason: HOSPADM

## 2022-12-03 RX ORDER — ACETAMINOPHEN 325 MG/1
650 TABLET ORAL
Status: DISCONTINUED | OUTPATIENT
Start: 2022-12-03 | End: 2022-12-05 | Stop reason: HOSPADM

## 2022-12-03 RX ORDER — HYDROXYZINE 50 MG/1
50 TABLET, FILM COATED ORAL
Status: DISCONTINUED | OUTPATIENT
Start: 2022-12-03 | End: 2022-12-05 | Stop reason: HOSPADM

## 2022-12-03 RX ORDER — BISACODYL 5 MG
5 TABLET, DELAYED RELEASE (ENTERIC COATED) ORAL DAILY PRN
Status: DISCONTINUED | OUTPATIENT
Start: 2022-12-03 | End: 2022-12-03 | Stop reason: SDUPTHER

## 2022-12-03 RX ORDER — ZOLPIDEM TARTRATE 5 MG/1
5 TABLET ORAL
Status: DISCONTINUED | OUTPATIENT
Start: 2022-12-03 | End: 2022-12-05 | Stop reason: HOSPADM

## 2022-12-03 RX ORDER — MAG HYDROX/ALUMINUM HYD/SIMETH 200-200-20
30 SUSPENSION, ORAL (FINAL DOSE FORM) ORAL
Status: DISCONTINUED | OUTPATIENT
Start: 2022-12-03 | End: 2022-12-05 | Stop reason: HOSPADM

## 2022-12-03 RX ADMIN — ACETAMINOPHEN 650 MG: 325 TABLET ORAL at 17:51

## 2022-12-03 RX ADMIN — HYDROXYZINE HYDROCHLORIDE 50 MG: 50 TABLET, FILM COATED ORAL at 11:57

## 2022-12-03 RX ADMIN — HYDROXYZINE HYDROCHLORIDE 50 MG: 50 TABLET, FILM COATED ORAL at 21:41

## 2022-12-03 RX ADMIN — LURASIDONE HYDROCHLORIDE 40 MG: 40 TABLET, FILM COATED ORAL at 09:26

## 2022-12-03 RX ADMIN — TRAZODONE HYDROCHLORIDE 50 MG: 50 TABLET ORAL at 21:41

## 2022-12-03 RX ADMIN — IBUPROFEN 800 MG: 400 TABLET, FILM COATED ORAL at 10:52
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
## 2022-12-03 NOTE — GROUP NOTE
DIANA  GROUP DOCUMENTATION INDIVIDUAL                                                                          Group Therapy Note    Date: 12/3/2022    Group Start Time: 1020  Group End Time: 1100  Group Topic: [employer]    SRM 2 BH NON ACUTE    Kasie Ortiz    IP 1[facility] GROUP DOCUMENTATION GROUP    Group Therapy Note    Attendees:          Attendance: {[facility] GROUP DOC ATTENDANCE:91229}    Patient's Goal:  ***    Interventions/techniques: {[facility] GROUP DOC INTERVENTIONS/TECHNIQUES:90825}    Follows Directions: {[facility] GROUP DOC FOLLOWS DIRECTION:88294}    Interactions: {[facility] GROUP DOC INTERACTIONS:56150}    Mental Status: {[facility] GROUP DOC MENTAL STATUS:81957}    Behavior/appearance: {[facility] GROUP DOC BEHAVIOR/APPEARANCE:83035}    Goals Achieved: {[facility] GROUP DOC GOALS ACHIEVED:92542}      Additional Notes:  ***    [employer]

## 2022-12-03 NOTE — PROGRESS NOTES
Pt left the unit after being told not to, policy regarding leaving the unit reinforced. 1923 Back in her room. 2045 Pt spending some time with her baby before going to psych unit. 2115 pt called out crying asking \" What do I do with  all this stuff , baby stuffs , I need time to spend with my daughter\" . Pt appears to be anxious. Pt calling family members to  baby car seat and clothes, family unable to at this time. 2130 Informed to pt nursery will keep all the baby things until a family member able to take them home, verbalized understandings. Pt appeared calmer at this time.

## 2022-12-03 NOTE — GROUP NOTE
IP  GROUP DOCUMENTATION INDIVIDUAL                                                                          Group Therapy Note    Date: 12/3/2022    Group Start Time: 0935  Group End Time: 2844  Group Topic: Education Group - Inpatient    SRM 2 BH NON ACUTE    Brittany Hodgkin    IP 1150 [facility] GROUP DOCUMENTATION GROUP    Group Therapy Note     Facilitated discussion focus on identifying different barriers that has prevented progress and identifying ways to confront them    Attendees: 9/14       Attendance: Did not attend    Additional Notes:  Encouraged but did not attend    ALBINA LiuS

## 2022-12-03 NOTE — BH NOTES
PSA PART II ADDITIONAL INFORMATION        Access To [employer]: No    Substance Use: YES    Last Use: Pt stated that before coming into the hospital she took two pills that she thought were melatonin but might not have been    Type of Substance: Other PT was vague and shared that when drugs are around her that she might do them     Frequency of Use: varies     Request to See : NO    If yes, notified : NO    Guardian:NO    Guardian Contact:Pt is her own legal guardian    Release of Information Signed: NO    Release of Information Signed For: Pt refused to sign JANETT

## 2022-12-03 NOTE — BH NOTES
Patient up for meals, takes her meal tray back to her room, eats 100%. Medication compliant. No side effects noted. Requested & received motrin 800 mg po at 1052 for breast discomfort. Patient has been manually breast pumping today. Patient reports throwing  away what she pumps. Reports pumping helps to relieve the discomfort & also, states that she plans to breast feed her baby when she returns home. Reports being here to get on medications. Requested & received atarax 50 mg po at 1157 for complaints of anxiety. No further complaints voiced. Presently resting in bed with eyes closed. Resp even & unlabored. Remains on CO. Continue to monitor.

## 2022-12-03 NOTE — GROUP NOTE
DIANA  GROUP DOCUMENTATION INDIVIDUAL                                                                          Group Therapy Note    Date: 12/3/2022    Group Start Time: 1320  Group End Time: 4098  Group Topic: Recreational/Music Therapy    SRM 2  NON ACUTE    Manav ORTIZ [facility] GROUP DOCUMENTATION GROUP    Group Therapy Note    Facilitated leisure skills group to reinforce positive coping and to manage mood through music, social interaction, group activities and art task    Attendees: 10/14       Attendance: Did not attend    Additional Notes:  Encouraged but did not attend    Wm Stewart, [address]
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
## 2022-12-04 PROCEDURE — 74011250637 HC RX REV CODE- 250/637: Performed by: PSYCHIATRY & NEUROLOGY

## 2022-12-04 PROCEDURE — 65220000003 HC RM SEMIPRIVATE PSYCH

## 2022-12-04 RX ADMIN — LURASIDONE HYDROCHLORIDE 40 MG: 40 TABLET, FILM COATED ORAL at 09:45

## 2022-12-04 RX ADMIN — ACETAMINOPHEN 650 MG: 325 TABLET ORAL at 16:10

## 2022-12-04 RX ADMIN — IBUPROFEN 800 MG: 400 TABLET, FILM COATED ORAL at 20:50

## 2022-12-04 RX ADMIN — IBUPROFEN 800 MG: 400 TABLET, FILM COATED ORAL at 09:51

## 2022-12-04 RX ADMIN — TRAZODONE HYDROCHLORIDE 50 MG: 50 TABLET ORAL at 20:50

## 2022-12-04 RX ADMIN — HYDROXYZINE HYDROCHLORIDE 50 MG: 50 TABLET, FILM COATED ORAL at 16:10

## 2022-12-04 NOTE — BH NOTES
Patient up at meal time, appetite good. Takes her tray back to her room & eats alone. Medication compliant. No side effects noted. Flat affect. Patient has spent the majority of her shift in bed. Patient states that she hates to isolate herself in her room, but the noise level & the peers are too disruptive that she cannot stand to be around them. States, \"they are immature. \" Patient is presently sitting in her room, manually pumping her breasts. Patient states that she hopes that she & her baby leave tomorrow & plans to stay at her stepfather's house. Denies SI/HI. Denies AVH. Remains on CO. Continue to monitor.

## 2022-12-04 NOTE — PROGRESS NOTES
Problem: Depressed Mood (Adult/Pediatric)  Goal: *STG: Participates in treatment plan  Outcome: Progressing Towards Goal  Goal: *STG: Participates in 1:1 therapy sessions  Outcome: Progressing Towards Goal  Goal: *STG: Verbalizes anger, guilt, and other feelings in a constructive manor  Outcome: Progressing Towards Goal  Goal: *STG: Attends activities and groups  Outcome: Progressing Towards Goal  Goal: *STG: Demonstrates reduction in symptoms and increase in insight into coping skills/future focused  Outcome: Progressing Towards Goal  Goal: *STG: Remains safe in hospital  Outcome: Progressing Towards Goal  Goal: *STG: Complies with medication therapy  Outcome: Progressing Towards Goal  Goal: *LTG: Returns to previous level of functioning and participates with after care plan  Outcome: Progressing Towards Goal  Goal: *LTG: Understands illness and can identify signs of relapse  Outcome: Progressing Towards Goal  Goal: Interventions  Outcome: Progressing Towards Goal
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
## 2022-12-04 NOTE — GROUP NOTE
IP  GROUP DOCUMENTATION INDIVIDUAL                                                                          Group Therapy Note    Date: 12/4/2022    Group Start Time: 1320  Group End Time: 0263  Group Topic: Recreational/Music Therapy    SRM 2  NON ACUTE    Kevyn Bruner    IP 1150 [facility] GROUP DOCUMENTATION GROUP    Group Therapy Note    Facilitated leisure skills group to reinforce positive coping and to manage mood through music, social interaction, group activities and art task    Attendees: 13/14       Attendance: Attended    Patient's Goal:  Attend group daily     Interventions/techniques: Art integration and Supported    Follows Directions: Followed directions    Interactions: Interacted appropriately    Mental Status: Calm    Behavior/appearance: Cooperative    Goals Achieved: Able to engage in interactions and Able to listen to others      Additional Notes:  Receptive to listening to music and a song she selected. Declined to work on leisure task during group. Accepted journal and leisure packet.  Interacted with staff    Dereje Tellez

## 2022-12-04 NOTE — GROUP NOTE
IP  GROUP DOCUMENTATION INDIVIDUAL                                                                          Group Therapy Note    Date: 12/4/2022    Group Start Time: 0935  Group End Time: 0524  Group Topic: Education Group - Inpatient    SRM 2  NON ACUTE    Oz Espitia    IP 1150 [facility] GROUP DOCUMENTATION GROUP    Group Therapy Note    Facilitated discussion focus on understanding the importance of healthy boundaries and developing healthy boundaries to improve relationships    Attendees: 10/14       Attendance: Did not attend    Additional Notes:  Encouraged but did not attend    Kaity Shoulder, CTRS

## 2022-12-05 VITALS
TEMPERATURE: 97.9 F | DIASTOLIC BLOOD PRESSURE: 69 MMHG | HEART RATE: 84 BPM | RESPIRATION RATE: 18 BRPM | SYSTOLIC BLOOD PRESSURE: 121 MMHG | OXYGEN SATURATION: 100 %

## 2022-12-05 LAB
ANION GAP SERPL CALC-SCNC: 7 MMOL/L (ref 5–15)
BASOPHILS # BLD: 0.1 K/UL (ref 0–0.1)
BASOPHILS NFR BLD: 0 % (ref 0–1)
BUN SERPL-MCNC: 18 MG/DL (ref 6–20)
BUN/CREAT SERPL: 30 (ref 12–20)
CA-I BLD-MCNC: 8.9 MG/DL (ref 8.5–10.1)
CHLORIDE SERPL-SCNC: 111 MMOL/L (ref 97–108)
CO2 SERPL-SCNC: 23 MMOL/L (ref 21–32)
CREAT SERPL-MCNC: 0.61 MG/DL (ref 0.55–1.02)
DIFFERENTIAL METHOD BLD: ABNORMAL
EOSINOPHIL # BLD: 0.1 K/UL (ref 0–0.4)
EOSINOPHIL NFR BLD: 1 % (ref 0–7)
ERYTHROCYTE [DISTWIDTH] IN BLOOD BY AUTOMATED COUNT: 14.9 % (ref 11.5–14.5)
GLUCOSE SERPL-MCNC: 85 MG/DL (ref 65–100)
HCT VFR BLD AUTO: 27.4 % (ref 35–47)
HGB BLD-MCNC: 8.4 G/DL (ref 11.5–16)
IMM GRANULOCYTES # BLD AUTO: 0.4 K/UL (ref 0–0.04)
IMM GRANULOCYTES NFR BLD AUTO: 2 % (ref 0–0.5)
LYMPHOCYTES # BLD: 1.1 K/UL (ref 0.8–3.5)
LYMPHOCYTES NFR BLD: 6 % (ref 12–49)
MCH RBC QN AUTO: 26.6 PG (ref 26–34)
MCHC RBC AUTO-ENTMCNC: 30.7 G/DL (ref 30–36.5)
MCV RBC AUTO: 86.7 FL (ref 80–99)
MONOCYTES # BLD: 0.7 K/UL (ref 0–1)
MONOCYTES NFR BLD: 4 % (ref 5–13)
NEUTS SEG # BLD: 16.6 K/UL (ref 1.8–8)
NEUTS SEG NFR BLD: 87 % (ref 32–75)
NRBC # BLD: 0.05 K/UL (ref 0–0.01)
NRBC BLD-RTO: 0.3 PER 100 WBC
PLATELET # BLD AUTO: 608 K/UL (ref 150–400)
PMV BLD AUTO: 9.2 FL (ref 8.9–12.9)
POTASSIUM SERPL-SCNC: 4.4 MMOL/L (ref 3.5–5.1)
RBC # BLD AUTO: 3.16 M/UL (ref 3.8–5.2)
SODIUM SERPL-SCNC: 141 MMOL/L (ref 136–145)
WBC # BLD AUTO: 19 K/UL (ref 3.6–11)

## 2022-12-05 PROCEDURE — 80048 BASIC METABOLIC PNL TOTAL CA: CPT

## 2022-12-05 PROCEDURE — 85025 COMPLETE CBC W/AUTO DIFF WBC: CPT

## 2022-12-05 PROCEDURE — 74011250637 HC RX REV CODE- 250/637: Performed by: PSYCHIATRY & NEUROLOGY

## 2022-12-05 PROCEDURE — 36415 COLL VENOUS BLD VENIPUNCTURE: CPT

## 2022-12-05 RX ORDER — ZOLPIDEM TARTRATE 5 MG/1
5 TABLET ORAL
Qty: 15 TABLET | Refills: 1 | Status: SHIPPED | OUTPATIENT
Start: 2022-12-05

## 2022-12-05 RX ADMIN — HYDROXYZINE HYDROCHLORIDE 50 MG: 50 TABLET, FILM COATED ORAL at 04:57

## 2022-12-05 RX ADMIN — IBUPROFEN 800 MG: 400 TABLET, FILM COATED ORAL at 04:57

## 2022-12-05 RX ADMIN — LURASIDONE HYDROCHLORIDE 40 MG: 40 TABLET, FILM COATED ORAL at 08:39

## 2022-12-05 RX ADMIN — HYDROXYZINE HYDROCHLORIDE 50 MG: 50 TABLET, FILM COATED ORAL at 11:09

## 2022-12-05 NOTE — BH NOTES
DISCHARGE SUMMARY     Brendan Irwin  :   [phone]  MRN:   [id number]     The patient Liz Plascencia exhibits the ability to control behavior in a less restrictive environment. Patient's level of functioning is improving. No assaultive/destructive behavior has been observed for the past 24 hours. No suicidal/homicidal threat or behavior has been observed for the past 24 hours. There is no evidence of serious medication side effects. Patient has not been in physical or protective restraints for at least the past 24 hours. If weapons involved, how are they secured? N/a     Is patient aware of and in agreement with discharge plan? Yes. Pt will be returning to her step-fathers home      Arrangements for medication:  Prescriptions will be sent to listed pharmacy     Copy of discharge instructions to provider?:  yes     Arrangements for transportation home:  pt will be picked up by her step-father     Keep all follow up appointments as scheduled, continue to take prescribed medications per physician instructions.   Mental health crisis number:  975 or your local mental health crisis line number at [address] [facility] WARM LINE      1-992-112-MHAV (7979)      M-F: 9am to 9pm      Sat & Sun: 5pm - 9pm  National suicide prevention lines:                             8-933-DDPZAXT ([phone])       7-255-604-TALK ([phone])

## 2022-12-05 NOTE — BH NOTES
Pt.is up and visible on unit,affect is a little brighter, appetite good, appearance is neat,denies feeling suicidal or depressed, pt.is awaiting discharge so she can take her baby home,pt. has been discharged to home with all personal belongings, discharge instructions for follow up,prescriptions sent to [address] Pt.escorted off unit via staff to be picked up by relative.

## 2022-12-05 NOTE — DISCHARGE SUMMARY
PSYCHIATRIC DISCHARGE SUMMARY         IDENTIFICATION:    Patient Name  Danis Donis   Date of Birth 1980   [facility] [id number]   Medical Record Number  [id number]      Age  40 y.o. PCP None   Admit date:  12/2/2022    Discharge date: 12/5/2022   Room Number  243/01  @ [address] [place] [address]   Date of Service  12/5/2022            TYPE OF DISCHARGE: REGULAR               CONDITION AT DISCHARGE: stable       PROVISIONAL & DISCHARGE DIAGNOSES:           CC & HISTORY OF PRESENT ILLNESS:       SOCIAL HISTORY:    Social History     Socioeconomic History    Marital status:      Spouse name: Not on file    Number of children: Not on file    Years of education: Not on file    Highest education level: Not on file   Occupational History    Not on file   Tobacco Use    Smoking status: Heavy Smoker     Packs/day: 1.00     Types: Cigarettes    Smokeless tobacco: Never   Vaping Use    Vaping Use: Never used   Substance and Sexual Activity    Alcohol use: Yes     Comment: rarely    Drug use: Not Currently     Types: Marijuana    Sexual activity: Yes     Partners: Male     Birth control/protection: None   Other Topics Concern    Not on file   Social History Narrative    Not on file     Social Determinants of Health     Financial Resource Strain: Not on file   Food Insecurity: Not on file   Transportation Needs: Not on file   Physical Activity: Not on file   Stress: Not on file   Social Connections: Not on file   Intimate Partner Violence: Not on file   Housing Stability: Not on file      FAMILY HISTORY:   Family History   Family history unknown: Yes             HOSPITALIZATION COURSE:    Danis Donis was admitted to the inpatient psychiatric unit [address] [place] [address] for acute psychiatric stabilization in regards to symptomatology as described in the HPI above. While on the unit Danis Donis was involved in individual, group, occupational and milieu therapy.   Psychiatric medications were adjusted during this hospitalization. Sosa Busch demonstrated a slow, but progressive improvement in overall condition. Much of patient's depression appeared to be related to situational stressors, effects of drugs of abuse, and psychological factors. Please see individual progress notes for more specific details regarding patient's hospitalization course. At time of discharge, Sosa Busch is without significant problems of depression, psychosis, socrates. Patient free of suicidal and homicidal ideations and reports many positive predictive factors in terms of not attempting suicide or homicide. Patient with request for discharge today. There are no grounds to seek a TDO. Patient has maximized benefit to be derived from acute inpatient psychiatric treatment. All members of the treatment team concur with each other in regards to plans for discharge today per patient's request.  Patient and family are aware and in agreement with discharge and discharge plan. LABS AND IMAGAING:    Labs Reviewed   CBC WITH AUTOMATED DIFF   METABOLIC PANEL, BASIC     No results found for: DS35, PHEN, PHENO, PHENT, DILF, DS39, PHENY, PTN, VALF2, VALAC, VALP, VALPR, DS6, CRBAM, CRBAMP, CARB2, XCRBAM  Admission on 11/30/2022, Discharged on 12/02/2022   Component Date Value Ref Range Status    AMPHETAMINES 11/30/2022 Positive (A)  Negative   Final    BARBITURATES 11/30/2022 Negative  Negative   Final    BENZODIAZEPINES 11/30/2022 Negative  Negative   Final    COCAINE 11/30/2022 Negative  Negative   Final    METHADONE 11/30/2022 Negative  Negative   Final    OPIATES 11/30/2022 Negative  Negative   Final    PCP(PHENCYCLIDINE) 11/30/2022 Negative  Negative   Final    THC (TH-CANNABINOL) 11/30/2022 Negative  Negative   Final    Drug screen comment 11/30/2022     Final                    Value: This test is a screen for drugs of abuse in a medical setting only (i.e., they are unconfirmed results and as such must not be used for non-medical purposes, e.g.,employment testing, legal testing). Due to its inherent nature, false positive (FP) and false negative (FN) results may be obtained. Therefore, if necessary for medical care, recommend confirmation of positive findings by GC/MS. WBC 11/30/2022 16.5 (A)  3.6 - 11.0 K/uL Final    RBC 11/30/2022 3.34 (A)  3.80 - 5.20 M/uL Final    HGB 11/30/2022 9.1 (A)  11.5 - 16.0 g/dL Final    HCT 11/30/2022 28.8 (A)  35.0 - 47.0 % Final    MCV 11/30/2022 86.2  80.0 - 99.0 FL Final    MCH 11/30/2022 27.2  26.0 - 34.0 PG Final    MCHC 11/30/2022 31.6  30.0 - 36.5 g/dL Final    RDW 11/30/2022 14.7 (A)  11.5 - 14.5 % Final    PLATELET 02/44/3098 481  150 - 400 K/uL Final    MPV 11/30/2022 10.4  8.9 - 12.9 FL Final    NRBC 11/30/2022 0.3 (A)  0.0  WBC Final    ABSOLUTE NRBC 11/30/2022 0.05 (A)  0.00 - 0.01 K/uL Final    NEUTROPHILS 11/30/2022 82 (A)  32 - 75 % Final    LYMPHOCYTES 11/30/2022 9 (A)  12 - 49 % Final    MONOCYTES 11/30/2022 6  5 - 13 % Final    EOSINOPHILS 11/30/2022 1  0 - 7 % Final    BASOPHILS 11/30/2022 0  0 - 1 % Final    IMMATURE GRANULOCYTES 11/30/2022 2 (A)  0 - 0.5 % Final    ABS. NEUTROPHILS 11/30/2022 13.6 (A)  1.8 - 8.0 K/UL Final    ABS. LYMPHOCYTES 11/30/2022 1.5  0.8 - 3.5 K/UL Final    ABS. MONOCYTES 11/30/2022 1.0  0.0 - 1.0 K/UL Final    ABS. EOSINOPHILS 11/30/2022 0.1  0.0 - 0.4 K/UL Final    ABS. BASOPHILS 11/30/2022 0.1  0.0 - 0.1 K/UL Final    ABS. IMM. GRANS.  11/30/2022 0.3 (A)  0.00 - 0.04 K/UL Final    DF 11/30/2022 AUTOMATED    Final    Crossmatch Expiration 11/30/2022 12/03/2022,2359   Final    ABO/Rh(D) 11/30/2022 O Positive   Final    Antibody screen 11/30/2022 Negative   Final    Sodium 11/30/2022 136  136 - 145 mmol/L Final    Potassium 11/30/2022 3.8  3.5 - 5.1 mmol/L Final    Chloride 11/30/2022 106  97 - 108 mmol/L Final    CO2 11/30/2022 20 (A)  21 - 32 mmol/L Final    Anion gap 11/30/2022 10  5 - 15 mmol/L Final    Glucose 11/30/2022 106 (A)  65 - 100 mg/dL Final    BUN 11/30/2022 10  6 - 20 mg/dL Final    Creatinine 11/30/2022 0.61  0.55 - 1.02 mg/dL Final    BUN/Creatinine ratio 11/30/2022 16  12 - 20   Final    eGFR 11/30/2022 >60  >60 ml/min/1.73m2 Final    Calcium 11/30/2022 8.6  8.5 - 10.1 mg/dL Final    Bilirubin, total 11/30/2022 0.3  0.2 - 1.0 mg/dL Final    AST (SGOT) 11/30/2022 5 (A)  15 - 37 U/L Final    ALT (SGPT) 11/30/2022 15  12 - 78 U/L Final    Alk. phosphatase 11/30/2022 218 (A)  45 - 117 U/L Final    Protein, total 11/30/2022 6.3 (A)  6.4 - 8.2 g/dL Final    Albumin 11/30/2022 2.0 (A)  3.5 - 5.0 g/dL Final    Globulin 11/30/2022 4.3 (A)  2.0 - 4.0 g/dL Final    A-G Ratio 11/30/2022 0.5 (A)  1.1 - 2.2   Final    SARS-CoV-2 by PCR 12/02/2022 Not Detected  Not Detected   Final    Influenza A by PCR 12/02/2022 Not Detected  Not Detected   Final    Influenza B by PCR 12/02/2022 Not Detected  Not Detected   Final   Routine Prenatal on 11/22/2022   Component Date Value Ref Range Status    Atopobium vaginae 11/22/2022 Low - 0  Score Final    BVAB 2 11/22/2022 Low - 0  Score Final    Megasphaera 1 11/22/2022 Low - 0  Score Final    C. albicans, DMAON 11/22/2022 Positive (A)  Negative Final    C. glabrata, DAMON 11/22/2022 Positive (A)  Negative Final    C PARAPSILOSIS/TROPICALIS 11/22/2022 Negative  Negative Final    Candida lusitaniae 11/22/2022 Negative  Negative Final    Beba krusei 11/22/2022 Negative  Negative Final    T. vaginalis, DAMON 11/22/2022 Positive (A)  Negative Final    C. trachomatis, DAMON 11/22/2022 Negative  Negative Final    N. gonorrhoeae, DAMON 11/22/2022 Negative  Negative Final     No results found. DISPOSITION:    Patient to f/u with drug/etoh rehabilitation, psychiatric and psychotherapy appointments. FOLLOW-UP CARE:    Activity as tolerated  Regular Diet  Wound Care: none needed.   Follow-up Information       Follow up With Specialties Details Why Contact Info    None    None ([phone]) Patient stated that they have no PCP PROGNOSIS:    Limited ---- based on nature of patient's pathology/ies and treatment compliance issues. Prognosis is greatly dependent upon patient's ability to remain sober and to follow up with drug/etoh rehabilitation and psychiatric/psychotherapy appointments as well as to comply with psychiatric medications as prescribed. DISCHARGE MEDICATIONS:    Informed consent given for the use of following psychotropic medications:  Current Discharge Medication List        START taking these medications    Details   zolpidem (AMBIEN) 5 mg tablet Take 1 Tablet by mouth nightly as needed for Sleep. Max Daily Amount: 5 mg. Qty: 15 Tablet, Refills: 1  Start date: 12/5/2022    Associated Diagnoses: Post partum depression           CONTINUE these medications which have CHANGED    Details   lurasidone (LATUDA) 60 mg tab tablet Take 1 Tablet by mouth daily (with breakfast).   Qty: 30 Tablet, Refills: 1  Start date: 12/6/2022           STOP taking these medications       ibuprofen (MOTRIN) 800 mg tablet Comments:   Reason for Stopping:         ibuprofen (MOTRIN) 800 mg tablet Comments:   Reason for Stopping:         docusate sodium (COLACE) 100 mg capsule Comments:   Reason for Stopping:         albuterol (PROVENTIL HFA, VENTOLIN HFA, PROAIR HFA) 90 mcg/actuation inhaler Comments:   Reason for Stopping:         oxyCODONE IR (ROXICODONE) 5 mg immediate release tablet Comments:   Reason for Stopping:         budesonide-formoteroL (SYMBICORT) 160-4.5 mcg/actuation HFAA Comments:   Reason for Stopping:         prenatal vit no.130-iron-folic 27 mg iron- 729 mcg tab tablet Comments:   Reason for Stopping:                      Signed:  Lola Sterling MD  12/5/2022
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
## 2022-12-05 NOTE — GROUP NOTE
DIANA  GROUP DOCUMENTATION INDIVIDUAL                                                                          Group Therapy Note    Date: 12/5/2022    Group Start Time: 1115  Group End Time: 1200  Group Topic: Process Group - Inpatient    [facility] ACUTE    [employer] [facility] GROUP DOCUMENTATION GROUP    Group Therapy Note  Process group was focused on pts views and thought about themselves. Pts were asked to share what animal represents them and what are the positive/negative characteristics they share. Pts then were asked what they thought others thought of them. Pts interacted well and provided one another feedback.   Attendees: 8-14       Attendance: Did not attend      Additional Notes:  Pt was encouraged to attend and chose not to    Orlando Dacosta

## 2022-12-05 NOTE — BH NOTES
Throughout the evening, the pt remained in her room most of the time. She was up for snacks/drink and to talk on the phone. She rated her anxiety an 8 to a 9/10 and her depression a 5/10, which she r/t her hormones, loudness on the unit, and not being able to get enough rest. Pt denies having any SI/HI or A/VH. She remains A+O and ambulates independently. Pt talked about her 3 grown children, one of her two grandchildren who is 11 yrs old, whom she if very close to. Pt stated that this pregnancy was a surprise. She has had two previous miscarriages. She stated that she is here because of her mother passing x4 years ago. Her mother was a diabetic and the pt had to make the decision for her mother to have both of her lower limbs amputated. Pt had, an outpouring, crying episode. She stated that her family is supportive. Pt affect is flat to WNLs. She has been cooperative and pleasant. She c/o lower abd pain and back discomfort that is throbbing and constant. Pt received prn Elizabeth Danielle for the discomfort at 2050 and at 030 70 25 13. The pt rated her abd and back pain an 8/10. She is med compliant and received prn Trazodone. Her anxiety level, this am, is 5 to 6/10 and she received prn Hydroxyzine. Pt given water/ice and a pack on cookies. The pt showered to relieve her breast discomfort, which she rated a 7/10. Pt given a bra, two panties, and 6 breast pads from her belongings. Pt is resting quietly in bed at this time. Respirations are quiet and unlabored. She remains on close observation for safety.

## 2022-12-05 NOTE — PROGRESS NOTES
Problem: Depressed Mood (Adult/Pediatric)  Goal: *STG: Remains safe in hospital  Outcome: Progressing Towards Goal     Problem: General Medical Care Plan  Goal: *Fluid volume balance  Outcome: Progressing Towards Goal  Goal: *Progressive mobility and function (eg: ADL's)  Outcome: Progressing Towards Goal     Problem: Falls - Risk of  Goal: *Absence of Falls  Description: Document Anastasia Fall Risk and appropriate interventions in the flowsheet. Outcome: Progressing Towards Goal  Note: Fall Risk Interventions:    -pt remains safe; no self injurious behavior noted or reported.  -pt accepting fluids/snacks.  -pt ambulated around the unit without difficulty. -no falls reported or noted; pt ambulates independently.

## 2022-12-05 NOTE — GROUP NOTE
IP  GROUP DOCUMENTATION INDIVIDUAL                                                                          Group Therapy Note    Date: 12/5/2022    Group Start Time: 0033  Group End Time: 9300  Group Topic: Recreational/Music Therapy    [facility] 2  NON ACUTE    Gustavo Mckeon    IP [address] [facility] GROUP DOCUMENTATION GROUP    Group Therapy Note    Facilitated leisure skills group to reinforce positive coping and to manage mood through music, social interaction, group activities and art task    Attendees: 10/15       Attendance: Did not attend    Additional Notes:  Encouraged but did not attend    DIANA Cheema
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
## 2022-12-05 NOTE — GROUP NOTE
IP  GROUP DOCUMENTATION INDIVIDUAL                                                                          Group Therapy Note    Date: 12/5/2022    Group Start Time: 0932  Group End Time: 3401  Group Topic: Education Group - Inpatient    [facility] 2  NON ACUTE    Mony Orourke    IP [address] [facility] GROUP DOCUMENTATION GROUP    Group Therapy Note    Facilitated group to introduce the definition of self-esteem and discuss information relating to creating steps to greater self-appreciation and recognizing symptoms of self-defeat    Attendees: 9/14       Attendance: Did not attend    Additional Notes:  Encouraged but did not attend    DIANA Kong

## 2022-12-06 RX ORDER — IBUPROFEN 800 MG/1
800 TABLET ORAL
Qty: 30 TABLET | Refills: 0 | Status: SHIPPED | OUTPATIENT
Start: 2022-12-06

## 2022-12-06 RX ORDER — OXYCODONE HYDROCHLORIDE 5 MG/1
5 TABLET ORAL
Qty: 18 TABLET | Refills: 0 | Status: SHIPPED | OUTPATIENT
Start: 2022-12-06 | End: 2022-12-09

## 2023-01-17 ENCOUNTER — OFFICE VISIT (OUTPATIENT)
Dept: OBGYN CLINIC | Age: 43
End: 2023-01-17
Payer: MEDICARE

## 2023-01-17 VITALS — WEIGHT: 192.5 LBS | BODY MASS INDEX: 32.07 KG/M2 | HEIGHT: 65 IN

## 2023-01-17 DIAGNOSIS — D72.829 LEUKOCYTOSIS, UNSPECIFIED TYPE: ICD-10-CM

## 2023-01-17 PROBLEM — Z3A.35 35 WEEKS GESTATION OF PREGNANCY: Status: RESOLVED | Noted: 2022-11-14 | Resolved: 2023-01-17

## 2023-01-17 PROBLEM — Z34.90 PREGNANCY: Status: RESOLVED | Noted: 2022-11-30 | Resolved: 2023-01-17

## 2023-01-17 PROBLEM — O09.523 MULTIGRAVIDA OF ADVANCED MATERNAL AGE IN THIRD TRIMESTER: Status: RESOLVED | Noted: 2022-07-08 | Resolved: 2023-01-17

## 2023-01-17 PROCEDURE — 0503F POSTPARTUM CARE VISIT: CPT | Performed by: OBSTETRICS & GYNECOLOGY

## 2023-01-17 NOTE — PROGRESS NOTES
Chief Complaint   Patient presents with    Post-Partum Care     [id number]/nl     Visit Vitals  Ht 5' 5\" (1.651 m)   Wt 192 lb 8 oz (87.3 kg)   LMP 01/13/2023 (Approximate)   Breastfeeding No   BMI 32.03 kg/m²

## 2023-01-17 NOTE — PROGRESS NOTES
Geraldo Emerson is a M6 , 40 y.o. female   Patient's last menstrual period was 01/13/2023 (approximate). She presents for her post-partum    She is having no significant problems. Menstrual status:  Cycles are  postpartum . Flow: postpartum. She does not have dysmenorrhea. Medical conditions:  Since her last annual GYN exam about one year ago, she has not the following changes in her health history: none. Mammogram History:    [facility] Results (most recent):  Results from Hospital Encounter encounter on 11/12/21    FILOMENA 3D HUSAM W MAMMO BI SCREENING INCL CAD    Narrative  BILATERAL DIGITAL SCREENING MAMMOGRAM WITH CAD AND TOMOSYNTHESIS:    HISTORY:  Asymptomatic. COMPARISON:  The requested outside mammograms are not available for comparison. TECHNIQUE:  Digital 2D-3D MLO and CC views obtained and reviewed. Computer Aided  Detection (CAD) was used in evaluating this mammogram.    FINDINGS:  The breast parenchyma is heterogeneously dense which may mask small  masses. No suspicious dominant mass, calcifications or architectural distortion  is identified. No significant interval change. Impression  No mammographic evidence of malignancy. ASSESSMENT CODE:  BIRADS CATEGORY:  1: Negative    RECOMMENDATIONS:  Routine 1-Year Mammographic Followup    NCI lifetime breast cancer risk 7.3%. Sonal Schneider 5 year breast cancer risk 0.4%. According to the [address], yearly mammograms are recommended  starting at age 36 and continuing, as long as, a woman is in good health. Clinical breast exams should be part of a periodic health exam about every 3  years for women in their 19's and 29's, and every year for women 40 and over. Breast self-exam is an option for women starting in their 20's. Any breast  change noted on a breast self-exam should be reported promptly to the patient's  healthcare provider.   Breast MRI is recommended for women with an approximately  20-25% or greater lifetime risk of breast cancer, including women with a strong  family history of breast or ovarian cancer and women who have been treated for  Hodgkin's disease. A negative Mammography report should not discourage follow  up or biopsy of a clinically significant finding and/or abnormality. Dense  breast tissue may obscure small neoplasms. DEXA Results (most recent):  No results found for this or any previous visit. Past Medical History:   Diagnosis Date    AMA (advanced maternal age) multigravida 35+     Anxiety     Depression     Drug abuse ([facility] [address]     Postpartum depression     PTSD (post-traumatic stress disorder)     Trauma      History reviewed. No pertinent surgical history. Prior to Admission medications    Medication Sig Start Date End Date Taking? Authorizing Provider   lurasidone (LATUDA) 60 mg tab tablet Take 1 Tablet by mouth daily (with breakfast). 12/6/22  Yes Lurlene Gaucher, MD       Allergies   Allergen Reactions    Nickel Rash          Tobacco History:  reports that she has been smoking cigarettes. She has been smoking an average of 1 pack per day. She has never used smokeless tobacco.  Alcohol use:  reports current alcohol use. Drug use:  reports that she does not currently use drugs after having used the following drugs: Marijuana. Family Medical/Cancer History:   Family History   Family history unknown: Yes          Review of Systems   Constitutional:  Negative for chills, fever, malaise/fatigue and weight loss. HENT:  Negative for congestion, ear pain, sinus pain and tinnitus. Eyes:  Negative for blurred vision and double vision. Respiratory:  Negative for cough, shortness of breath and wheezing. Cardiovascular:  Negative for chest pain and palpitations. Gastrointestinal:  Negative for abdominal pain, blood in stool, constipation, diarrhea, heartburn, nausea and vomiting. Genitourinary:  Negative for dysuria, flank pain, frequency, hematuria and urgency.    Musculoskeletal: Negative for joint pain and myalgias. Skin:  Negative for itching and rash. Neurological:  Negative for dizziness, weakness and headaches. Psychiatric/Behavioral:  Negative for depression, memory loss and suicidal ideas. The patient is not nervous/anxious and does not have insomnia. Physical Exam  Constitutional:       Appearance: Normal appearance. HENT:      Head: Normocephalic and atraumatic. Abdominal:      General: Abdomen is flat. Palpations: Abdomen is soft. Genitourinary:     General: Normal vulva. Vagina: Normal.      Cervix: Normal.      Uterus: Normal.       Adnexa: Right adnexa normal and left adnexa normal.      Rectum: Normal.      Comments: No PAP obtained  Neurological:      Mental Status: She is alert. Psychiatric:         Mood and Affect: Mood normal.         Behavior: Behavior normal.         Thought Content: Thought content normal.        Visit Vitals  Ht 5' 5\" (1.651 m)   Wt 192 lb 8 oz (87.3 kg)   LMP 01/13/2023 (Approximate)   Breastfeeding No   BMI 32.03 kg/m²         Assessment: Diagnoses and all orders for this visit:    1. Routine postpartum follow-up    2. Leukocytosis, unspecified type  -     CBC WITH AUTOMATED DIFF;  Future        Plan: Questions addressed  Counseled re: diet, exercise, healthy lifestyle  Return for Annual  Rec annual mammogram        Follow-up and Dispositions    Return for Annual.

## 2023-01-18 LAB
BASOPHILS # BLD AUTO: 0.1 X10E3/UL (ref 0–0.2)
BASOPHILS NFR BLD AUTO: 1 %
EOSINOPHIL # BLD AUTO: 0.2 X10E3/UL (ref 0–0.4)
EOSINOPHIL NFR BLD AUTO: 1 %
ERYTHROCYTE [DISTWIDTH] IN BLOOD BY AUTOMATED COUNT: 15.5 % (ref 11.7–15.4)
HCT VFR BLD AUTO: 28.6 % (ref 34–46.6)
HGB BLD-MCNC: 8.4 G/DL (ref 11.1–15.9)
IMM GRANULOCYTES # BLD AUTO: 0.1 X10E3/UL (ref 0–0.1)
IMM GRANULOCYTES NFR BLD AUTO: 1 %
LYMPHOCYTES # BLD AUTO: 1.5 X10E3/UL (ref 0.7–3.1)
LYMPHOCYTES NFR BLD AUTO: 13 %
MCH RBC QN AUTO: 22.5 PG (ref 26.6–33)
MCHC RBC AUTO-ENTMCNC: 29.4 G/DL (ref 31.5–35.7)
MCV RBC AUTO: 77 FL (ref 79–97)
MONOCYTES # BLD AUTO: 0.6 X10E3/UL (ref 0.1–0.9)
MONOCYTES NFR BLD AUTO: 6 %
NEUTROPHILS # BLD AUTO: 8.5 X10E3/UL (ref 1.4–7)
NEUTROPHILS NFR BLD AUTO: 78 %
PLATELET # BLD AUTO: 578 X10E3/UL (ref 150–450)
RBC # BLD AUTO: 3.74 X10E6/UL (ref 3.77–5.28)
WBC # BLD AUTO: 10.9 X10E3/UL (ref 3.4–10.8)

## 2023-08-29 ENCOUNTER — TRANSCRIBE ORDERS (OUTPATIENT)
Facility: HOSPITAL | Age: 43
End: 2023-08-29

## 2023-08-29 DIAGNOSIS — F17.209 NICOTINE DEPENDENCE WITH NICOTINE-INDUCED DISORDER, UNSPECIFIED NICOTINE PRODUCT TYPE: Primary | ICD-10-CM

## 2023-09-11 ENCOUNTER — HOSPITAL ENCOUNTER (OUTPATIENT)
Facility: HOSPITAL | Age: 43
Discharge: HOME | End: 2023-09-14
Payer: MEDICAID

## 2023-09-11 DIAGNOSIS — F17.209 NICOTINE DEPENDENCE WITH NICOTINE-INDUCED DISORDER, UNSPECIFIED NICOTINE PRODUCT TYPE: ICD-10-CM

## 2023-09-11 PROCEDURE — 71271 CT THORAX LUNG CANCER SCR C-: CPT
